# Patient Record
Sex: MALE | Race: WHITE | Employment: FULL TIME | URBAN - METROPOLITAN AREA
[De-identification: names, ages, dates, MRNs, and addresses within clinical notes are randomized per-mention and may not be internally consistent; named-entity substitution may affect disease eponyms.]

---

## 2022-05-03 ENCOUNTER — TRANSCRIBE ORDER (OUTPATIENT)
Dept: SCHEDULING | Age: 57
End: 2022-05-03

## 2022-05-03 DIAGNOSIS — K21.9 ACID REFLUX: Primary | ICD-10-CM

## 2022-05-04 ENCOUNTER — APPOINTMENT (OUTPATIENT)
Dept: GENERAL RADIOLOGY | Age: 57
DRG: 391 | End: 2022-05-04
Attending: EMERGENCY MEDICINE
Payer: COMMERCIAL

## 2022-05-04 ENCOUNTER — APPOINTMENT (OUTPATIENT)
Dept: CT IMAGING | Age: 57
DRG: 391 | End: 2022-05-04
Attending: FAMILY MEDICINE
Payer: COMMERCIAL

## 2022-05-04 ENCOUNTER — HOSPITAL ENCOUNTER (INPATIENT)
Age: 57
LOS: 2 days | Discharge: HOME | DRG: 391 | End: 2022-05-06
Attending: EMERGENCY MEDICINE | Admitting: FAMILY MEDICINE
Payer: COMMERCIAL

## 2022-05-04 ENCOUNTER — TRANSCRIBE ORDER (OUTPATIENT)
Dept: SCHEDULING | Age: 57
End: 2022-05-04

## 2022-05-04 DIAGNOSIS — J69.0 ASPIRATION PNEUMONITIS (HCC): ICD-10-CM

## 2022-05-04 DIAGNOSIS — K21.9 GASTROESOPHAGEAL REFLUX DISEASE WITHOUT ESOPHAGITIS: Primary | ICD-10-CM

## 2022-05-04 DIAGNOSIS — K21.9 ESOPHAGEAL REFLUX: Primary | ICD-10-CM

## 2022-05-04 DIAGNOSIS — J96.01 ACUTE RESPIRATORY FAILURE WITH HYPOXEMIA (HCC): ICD-10-CM

## 2022-05-04 PROBLEM — J96.90 RESPIRATORY FAILURE (HCC): Status: ACTIVE | Noted: 2022-05-04

## 2022-05-04 LAB
ALBUMIN SERPL-MCNC: 3.8 G/DL (ref 3.5–5)
ALBUMIN/GLOB SERPL: 0.9 {RATIO} (ref 1.1–2.2)
ALP SERPL-CCNC: 98 U/L (ref 45–117)
ALT SERPL-CCNC: 26 U/L (ref 12–78)
ANION GAP SERPL CALC-SCNC: 6 MMOL/L (ref 5–15)
AST SERPL-CCNC: 36 U/L (ref 15–37)
ATRIAL RATE: 97 BPM
BASOPHILS # BLD: 0 K/UL (ref 0–0.1)
BASOPHILS NFR BLD: 0 % (ref 0–1)
BILIRUB SERPL-MCNC: 1.3 MG/DL (ref 0.2–1)
BNP SERPL-MCNC: 91 PG/ML
BUN SERPL-MCNC: 21 MG/DL (ref 6–20)
BUN/CREAT SERPL: 14 (ref 12–20)
CALCIUM SERPL-MCNC: 9.7 MG/DL (ref 8.5–10.1)
CALCULATED P AXIS, ECG09: 84 DEGREES
CALCULATED R AXIS, ECG10: 87 DEGREES
CALCULATED T AXIS, ECG11: 61 DEGREES
CHLORIDE SERPL-SCNC: 104 MMOL/L (ref 97–108)
CO2 SERPL-SCNC: 27 MMOL/L (ref 21–32)
COMMENT, HOLDF: NORMAL
COVID-19 RAPID TEST, COVR: NOT DETECTED
CREAT SERPL-MCNC: 1.47 MG/DL (ref 0.7–1.3)
CRP SERPL-MCNC: 1.13 MG/DL (ref 0–0.6)
D DIMER PPP FEU-MCNC: 0.53 MG/L FEU (ref 0–0.65)
DIAGNOSIS, 93000: NORMAL
DIFFERENTIAL METHOD BLD: ABNORMAL
EOSINOPHIL # BLD: 0 K/UL (ref 0–0.4)
EOSINOPHIL NFR BLD: 0 % (ref 0–7)
ERYTHROCYTE [DISTWIDTH] IN BLOOD BY AUTOMATED COUNT: 13.7 % (ref 11.5–14.5)
GLOBULIN SER CALC-MCNC: 4.2 G/DL (ref 2–4)
GLUCOSE SERPL-MCNC: 116 MG/DL (ref 65–100)
HCT VFR BLD AUTO: 42.5 % (ref 36.6–50.3)
HGB BLD-MCNC: 14.2 G/DL (ref 12.1–17)
IMM GRANULOCYTES # BLD AUTO: 0 K/UL (ref 0–0.04)
IMM GRANULOCYTES NFR BLD AUTO: 0 % (ref 0–0.5)
LACTATE SERPL-SCNC: 1.2 MMOL/L (ref 0.4–2)
LYMPHOCYTES # BLD: 0.7 K/UL (ref 0.8–3.5)
LYMPHOCYTES NFR BLD: 5 % (ref 12–49)
MCH RBC QN AUTO: 29.9 PG (ref 26–34)
MCHC RBC AUTO-ENTMCNC: 33.4 G/DL (ref 30–36.5)
MCV RBC AUTO: 89.5 FL (ref 80–99)
MONOCYTES # BLD: 1.2 K/UL (ref 0–1)
MONOCYTES NFR BLD: 8 % (ref 5–13)
NEUTS SEG # BLD: 12.8 K/UL (ref 1.8–8)
NEUTS SEG NFR BLD: 87 % (ref 32–75)
NRBC # BLD: 0 K/UL (ref 0–0.01)
NRBC BLD-RTO: 0 PER 100 WBC
P-R INTERVAL, ECG05: 160 MS
PLATELET # BLD AUTO: 274 K/UL (ref 150–400)
PMV BLD AUTO: 9.7 FL (ref 8.9–12.9)
POTASSIUM SERPL-SCNC: 4.4 MMOL/L (ref 3.5–5.1)
PROT SERPL-MCNC: 8 G/DL (ref 6.4–8.2)
Q-T INTERVAL, ECG07: 348 MS
QRS DURATION, ECG06: 92 MS
QTC CALCULATION (BEZET), ECG08: 441 MS
RBC # BLD AUTO: 4.75 M/UL (ref 4.1–5.7)
RBC MORPH BLD: ABNORMAL
SAMPLES BEING HELD,HOLD: NORMAL
SODIUM SERPL-SCNC: 137 MMOL/L (ref 136–145)
SOURCE, COVRS: NORMAL
TROPONIN-HIGH SENSITIVITY: 8 NG/L (ref 0–76)
TROPONIN-HIGH SENSITIVITY: 9 NG/L (ref 0–76)
VENTRICULAR RATE, ECG03: 97 BPM
WBC # BLD AUTO: 14.7 K/UL (ref 4.1–11.1)

## 2022-05-04 PROCEDURE — 74011000258 HC RX REV CODE- 258: Performed by: EMERGENCY MEDICINE

## 2022-05-04 PROCEDURE — 93005 ELECTROCARDIOGRAM TRACING: CPT

## 2022-05-04 PROCEDURE — 74011250636 HC RX REV CODE- 250/636: Performed by: FAMILY MEDICINE

## 2022-05-04 PROCEDURE — C9113 INJ PANTOPRAZOLE SODIUM, VIA: HCPCS | Performed by: EMERGENCY MEDICINE

## 2022-05-04 PROCEDURE — 87635 SARS-COV-2 COVID-19 AMP PRB: CPT

## 2022-05-04 PROCEDURE — 99285 EMERGENCY DEPT VISIT HI MDM: CPT

## 2022-05-04 PROCEDURE — 71046 X-RAY EXAM CHEST 2 VIEWS: CPT

## 2022-05-04 PROCEDURE — 96374 THER/PROPH/DIAG INJ IV PUSH: CPT

## 2022-05-04 PROCEDURE — 84484 ASSAY OF TROPONIN QUANT: CPT

## 2022-05-04 PROCEDURE — 65660000001 HC RM ICU INTERMED STEPDOWN

## 2022-05-04 PROCEDURE — 96375 TX/PRO/DX INJ NEW DRUG ADDON: CPT

## 2022-05-04 PROCEDURE — 85379 FIBRIN DEGRADATION QUANT: CPT

## 2022-05-04 PROCEDURE — 94762 N-INVAS EAR/PLS OXIMTRY CONT: CPT

## 2022-05-04 PROCEDURE — 80053 COMPREHEN METABOLIC PANEL: CPT

## 2022-05-04 PROCEDURE — 83880 ASSAY OF NATRIURETIC PEPTIDE: CPT

## 2022-05-04 PROCEDURE — 74011250637 HC RX REV CODE- 250/637: Performed by: FAMILY MEDICINE

## 2022-05-04 PROCEDURE — 74011250636 HC RX REV CODE- 250/636: Performed by: EMERGENCY MEDICINE

## 2022-05-04 PROCEDURE — 74011000250 HC RX REV CODE- 250: Performed by: EMERGENCY MEDICINE

## 2022-05-04 PROCEDURE — 85025 COMPLETE CBC W/AUTO DIFF WBC: CPT

## 2022-05-04 PROCEDURE — 84145 PROCALCITONIN (PCT): CPT

## 2022-05-04 PROCEDURE — 74011000250 HC RX REV CODE- 250: Performed by: FAMILY MEDICINE

## 2022-05-04 PROCEDURE — 87040 BLOOD CULTURE FOR BACTERIA: CPT

## 2022-05-04 PROCEDURE — 74011000258 HC RX REV CODE- 258: Performed by: FAMILY MEDICINE

## 2022-05-04 PROCEDURE — 36415 COLL VENOUS BLD VENIPUNCTURE: CPT

## 2022-05-04 PROCEDURE — 83605 ASSAY OF LACTIC ACID: CPT

## 2022-05-04 PROCEDURE — 86140 C-REACTIVE PROTEIN: CPT

## 2022-05-04 RX ORDER — DEXAMETHASONE SODIUM PHOSPHATE 10 MG/ML
10 INJECTION INTRAMUSCULAR; INTRAVENOUS
Status: COMPLETED | OUTPATIENT
Start: 2022-05-04 | End: 2022-05-04

## 2022-05-04 RX ORDER — HEPARIN SODIUM 5000 [USP'U]/ML
5000 INJECTION, SOLUTION INTRAVENOUS; SUBCUTANEOUS EVERY 8 HOURS
Status: DISCONTINUED | OUTPATIENT
Start: 2022-05-04 | End: 2022-05-06 | Stop reason: HOSPADM

## 2022-05-04 RX ORDER — FAMOTIDINE 20 MG/1
20 TABLET, FILM COATED ORAL 2 TIMES DAILY
Status: DISCONTINUED | OUTPATIENT
Start: 2022-05-04 | End: 2022-05-06 | Stop reason: HOSPADM

## 2022-05-04 RX ORDER — SODIUM CHLORIDE 9 MG/ML
75 INJECTION, SOLUTION INTRAVENOUS CONTINUOUS
Status: DISCONTINUED | OUTPATIENT
Start: 2022-05-04 | End: 2022-05-06 | Stop reason: HOSPADM

## 2022-05-04 RX ORDER — SODIUM CHLORIDE 0.9 % (FLUSH) 0.9 %
5-40 SYRINGE (ML) INJECTION AS NEEDED
Status: DISCONTINUED | OUTPATIENT
Start: 2022-05-04 | End: 2022-05-06 | Stop reason: HOSPADM

## 2022-05-04 RX ORDER — SODIUM CHLORIDE 0.9 % (FLUSH) 0.9 %
5-40 SYRINGE (ML) INJECTION EVERY 8 HOURS
Status: DISCONTINUED | OUTPATIENT
Start: 2022-05-04 | End: 2022-05-06 | Stop reason: HOSPADM

## 2022-05-04 RX ORDER — PANTOPRAZOLE SODIUM 40 MG/10ML
40 INJECTION, POWDER, LYOPHILIZED, FOR SOLUTION INTRAVENOUS DAILY
Status: DISCONTINUED | OUTPATIENT
Start: 2022-05-05 | End: 2022-05-06 | Stop reason: HOSPADM

## 2022-05-04 RX ORDER — ACETAMINOPHEN 325 MG/1
650 TABLET ORAL
Status: DISCONTINUED | OUTPATIENT
Start: 2022-05-04 | End: 2022-05-06 | Stop reason: HOSPADM

## 2022-05-04 RX ORDER — SODIUM CHLORIDE 0.9 % (FLUSH) 0.9 %
10 SYRINGE (ML) INJECTION DAILY
Status: DISCONTINUED | OUTPATIENT
Start: 2022-05-05 | End: 2022-05-06 | Stop reason: HOSPADM

## 2022-05-04 RX ADMIN — SODIUM CHLORIDE, PRESERVATIVE FREE 10 ML: 5 INJECTION INTRAVENOUS at 18:34

## 2022-05-04 RX ADMIN — PIPERACILLIN AND TAZOBACTAM 4.5 G: 4; .5 INJECTION, POWDER, LYOPHILIZED, FOR SOLUTION INTRAVENOUS at 17:25

## 2022-05-04 RX ADMIN — SODIUM CHLORIDE, PRESERVATIVE FREE 80 MG: 5 INJECTION INTRAVENOUS at 12:45

## 2022-05-04 RX ADMIN — SODIUM CHLORIDE 75 ML/HR: 9 INJECTION, SOLUTION INTRAVENOUS at 18:32

## 2022-05-04 RX ADMIN — SODIUM CHLORIDE 1.5 G: 900 INJECTION INTRAVENOUS at 16:37

## 2022-05-04 RX ADMIN — HEPARIN SODIUM 5000 UNITS: 5000 INJECTION INTRAVENOUS; SUBCUTANEOUS at 18:34

## 2022-05-04 RX ADMIN — FAMOTIDINE 20 MG: 20 TABLET ORAL at 17:21

## 2022-05-04 RX ADMIN — SODIUM CHLORIDE, PRESERVATIVE FREE 20 MG: 5 INJECTION INTRAVENOUS at 12:46

## 2022-05-04 RX ADMIN — SODIUM CHLORIDE, PRESERVATIVE FREE 10 ML: 5 INJECTION INTRAVENOUS at 22:00

## 2022-05-04 RX ADMIN — DEXAMETHASONE SODIUM PHOSPHATE 10 MG: 10 INJECTION INTRAMUSCULAR; INTRAVENOUS at 16:35

## 2022-05-04 NOTE — ED NOTES
Bedside shift change report given to Sánchez Garza (oncoming nurse) by Chelo Peterson (offgoing nurse). Report included the following information SBAR, Kardex, ED Summary and MAR.

## 2022-05-04 NOTE — ED TRIAGE NOTES
Pt ambulatory to triage from c/o constant acid reflux, weight loss, fatigue and poor appetite. Hx esophageal cancer. Thinks he aspirated last night and has been coughing. Had CT last week with oncology- negative.

## 2022-05-04 NOTE — ED PROVIDER NOTES
Dr Fawn Allison is oncologist at [facility]. GI is Dr Elvira Burgos. The history is provided by the patient. Shortness of Breath  This is a new problem. The average episode lasts 12 hours. The problem occurs continuously. The current episode started 6 to 12 hours ago. The problem has been gradually worsening. Associated symptoms include cough. Pertinent negatives include no fever, no wheezing, no syncope and no leg swelling. Associated symptoms comments: Severe reflux and believes he aspirated overnight. Precipitated by: GERD, difficulty with eating with loss of appetite and weight loss. history of esophageal cancer. He has tried nothing (home PPI and H2 blockers) for the symptoms. The treatment provided no relief. Weight Loss   Pertinent negatives include no fever. Past Medical History:   Diagnosis Date    Esophageal cancer [facility])     2008    Prostate cancer [facility])     2014    Radiation exposure        Past Surgical History:   Procedure Laterality Date    HX ESOPHAGECTOMY N/A 2008    removal of tumor from esophagus and stomach    HX PROSTATECTOMY N/A     2014         Family History:   Problem Relation Age of Onset    Cancer Mother     Cancer Father        Social History     Socioeconomic History    Marital status:      Spouse name: Not on file    Number of children: Not on file    Years of education: Not on file    Highest education level: Not on file   Occupational History    Not on file   Tobacco Use    Smoking status: Never Smoker    Smokeless tobacco: Never Used   Substance and Sexual Activity    Alcohol use:  Yes     Alcohol/week: 1.0 - 2.0 standard drink     Types: 1 - 2 Glasses of wine per week    Drug use: No    Sexual activity: Not on file   Other Topics Concern    Not on file   Social History Narrative    Not on file     Social Determinants of Health     Financial Resource Strain:     Difficulty of Paying Living Expenses: Not on file   Food Insecurity:     Worried About Running Out of Food in the Last Year: Not on file    Ran Out of Food in the Last Year: Not on file   Transportation Needs:     Lack of Transportation (Medical): Not on file    Lack of Transportation (Non-Medical): Not on file   Physical Activity:     Days of Exercise per Week: Not on file    Minutes of Exercise per Session: Not on file   Stress:     Feeling of Stress : Not on file   Social Connections:     Frequency of Communication with Friends and Family: Not on file    Frequency of Social Gatherings with Friends and Family: Not on file    Attends Yazidi Services: Not on file    Active Member of [address] [place] [address] [employer] or Organizations: Not on file    Attends Club or Organization Meetings: Not on file    Marital Status: Not on file   Intimate Partner Violence:     Fear of Current or Ex-Partner: Not on file    Emotionally Abused: Not on file    Physically Abused: Not on file    Sexually Abused: Not on file   Housing Stability:     Unable to Pay for Housing in the Last Year: Not on file    Number of [place] in the Last Year: Not on file    Unstable Housing in the Last Year: Not on file         ALLERGIES: Patient has no known allergies. Review of Systems   Constitutional: Negative for fever. Respiratory: Positive for cough and shortness of breath. Negative for wheezing. Cardiovascular: Negative for leg swelling and syncope. All other systems reviewed and are negative. Vitals:    05/04/22 1030 05/04/22 1147   BP: 124/79 137/82   Pulse: (!) 101 94   Resp: 18 20   Temp: 97.6 °F (36.4 °C) 98.2 °F (36.8 °C)   SpO2: 92% 91%            Physical Exam  Vitals and nursing note reviewed. Constitutional:       General: He is not in acute distress. Appearance: He is well-developed. HENT:      Head: Normocephalic and atraumatic. Eyes:      Conjunctiva/sclera: Conjunctivae normal.   Neck:      Trachea: No tracheal deviation. Cardiovascular:      Rate and Rhythm: Normal rate and regular rhythm. Pulmonary:      Effort: Pulmonary effort is normal. No respiratory distress. Breath sounds: Normal breath sounds. No decreased breath sounds, wheezing, rhonchi or rales. Abdominal:      General: There is no distension. Palpations: Abdomen is soft. Musculoskeletal:         General: No deformity. Normal range of motion. Cervical back: Neck supple. Skin:     General: Skin is warm and dry. Neurological:      Mental Status: He is alert. Cranial Nerves: No cranial nerve deficit. Psychiatric:         Behavior: Behavior normal.          MDM     31-year-old male presents after having severe reflux symptoms overnight which have been progressive in nature. He is followed by Dr. Dorene Sanders of GI after having esophageal cancer. He had reflux episodes overnight and has been coughing with new hypoxemia to 88% on room air, he is unable to wean from oxygen and is on 2 L nasal cannula. D-dimer is negative and is appropriately low risk for PE with more plausible alternative explanation for hypoxemia. Hospitalist was consulted to admit for monitoring and weaning of oxygen. Procedures    Perfect Serve Consult for Admission  3:05 PM    ED Room Number: XI60/69  Patient Name and age: Sriram Anderson 62 y.o.  male  Working Diagnosis:   1. Gastroesophageal reflux disease without esophagitis    2. Aspiration pneumonitis ([address]    3.  Acute respiratory failure with hypoxemia ([facility] [address]        COVID-19 Suspicion:  no  Sepsis present:  no  Reassessment needed: no  Code Status:  Full Code  Readmission: no  Isolation Requirements:  no  Recommended Level of Care:  telemetry  Department:[facility] Adult ED - 21

## 2022-05-04 NOTE — ED NOTES
Bedside shift change report given to Army Clarke (oncoming nurse) by Lonell Landau (offgoing nurse). Report included the following information SBAR, ED Summary, MAR and Recent Results.

## 2022-05-04 NOTE — H&P
[address] [facility] Adult  Hospitalist Group  History and Physical    Date of Service:  5/4/2022  Primary Care Provider: Neena Larson MD  Source of information: The patient and Chart review    Chief Complaint: Shortness of Breath and Weight Loss      History of Presenting Illness:   Danay Jerry is a 62 y.o. male who presents with shortness of breath    History was probably obtained from the patient, patient reports that he has extremely bad acid reflux, for the past month or so he is on reflux has gotten worse, reports that in the last 1 week he is getting severe symptoms, reports that yesterday he felt like he aspirated, this morning started having some shortness of breath, came to the ER, patient reports he has history of esophageal cancer diagnosed in 2008, this underwent treatment for the same, and is currently in remission, reports that he had a CT scan of the abdomen pelvis and chest done about a week back and \"everything was normal\". Patient denies any other complaints or problems. The patient denies any headache, blurry vision, sore throat, trouble swallowing, trouble with speech, chest pain,cough, fever, chills, , abd pain, urinary symptoms, constipation, recent travels, sick contacts, focal or generalized neurological symptoms, falls, injuries, rashes, contact with COVID-19 diagnosed patients, hematemesis, melena, hemoptysis, hematuria, rashes, denies starting any new medications and denies any other concerns or problems besides as mentioned above. REVIEW OF SYSTEMS:  A comprehensive review of systems was negative except for that written in the History of Present Illness.      Past Medical History:   Diagnosis Date    Esophageal cancer [facility])     2008    Prostate cancer [facility])     2014    Radiation exposure       Past Surgical History:   Procedure Laterality Date    HX ESOPHAGECTOMY N/A 2008    removal of tumor from esophagus and stomach    HX PROSTATECTOMY N/A     2014     Prior to Admission medications    Medication Sig Start Date End Date Taking? Authorizing Provider   esomeprazole (NEXIUM) 40 mg capsule Take  by mouth daily. Provider, Historical   ezetimibe (ZETIA) 10 mg tablet Take  by mouth. Provider, Historical     No Known Allergies   Family History   Problem Relation Age of Onset    Cancer Mother     Cancer Father       Social History:  reports that he has never smoked. He has never used smokeless tobacco. He reports current alcohol use of about 1.0 - 2.0 standard drink of alcohol per week. He reports that he does not use drugs. Family and social history were personally reviewed, all pertinent and relevant details are outlined as above. Objective:     Visit Vitals  BP (!) 141/81   Pulse 94   Temp 98.2 °F (36.8 °C)   Resp 20   SpO2 95%    O2 Flow Rate (L/min): 3 l/min O2 Device: Nasal cannula    PHYSICAL EXAM:   General: Alert x oriented x 3, awake, no acute distress, resting in bed, pleasant male, appears to be stated age  HEENT: PEERL, EOMI, moist mucus membranes  Neck: Supple, no JVD, no meningeal signs  Chest: Decreased basal breath sound  CVS: RRR, S1 S2 heard, no murmurs/rubs/gallops  Abd: Soft, non-tender, non-distended, +bowel sounds   Ext: No clubbing, no cyanosis, no edema  Neuro/Psych: No focal neurological deficits  Cap refill: Brisk, less than 3 seconds  Pulses: 2+, symmetric in all extremities  Skin: Warm, dry, without rashes or lesions    Data Review: All diagnostic labs and studies have been reviewed.     Abnormal Labs Reviewed   METABOLIC PANEL, COMPREHENSIVE - Abnormal; Notable for the following components:       Result Value    Glucose 116 (*)     BUN 21 (*)     Creatinine 1.47 (*)     GFR est AA 60 (*)     GFR est non-AA 49 (*)     Bilirubin, total 1.3 (*)     Globulin 4.2 (*)     A-G Ratio 0.9 (*)     All other components within normal limits   CBC WITH AUTOMATED DIFF - Abnormal; Notable for the following components:    WBC 14.7 (*) NEUTROPHILS 87 (*)     LYMPHOCYTES 5 (*)     ABS. NEUTROPHILS 12.8 (*)     ABS. LYMPHOCYTES 0.7 (*)     ABS. MONOCYTES 1.2 (*)     All other components within normal limits   C REACTIVE PROTEIN, QT - Abnormal; Notable for the following components:    C-Reactive protein 1.13 (*)     All other components within normal limits       All Micro Results     Procedure Component Value Units Date/Time    CULTURE, BLOOD, PAIRED [[id number]] Collected: 05/04/22 1600    Order Status: Completed Specimen: Blood Updated: 05/04/22 1632    COVID-19 RAPID TEST [[id number]]     Order Status: Sent           IMAGING:   XR CHEST PA LAT   Final Result   No acute abnormality. XR UPPER GI/SMALL BOWEL    (Results Pending)        ECG/ECHO:    Results for orders placed or performed during the hospital encounter of 05/04/22   EKG, 12 LEAD, INITIAL   Result Value Ref Range    Ventricular Rate 97 BPM    Atrial Rate 97 BPM    P-R Interval 160 ms    QRS Duration 92 ms    Q-T Interval 348 ms    QTC Calculation (Bezet) 441 ms    Calculated P Axis 84 degrees    Calculated R Axis 87 degrees    Calculated T Axis 61 degrees    Diagnosis       Normal sinus rhythm  Nonspecific ST abnormality    When compared with ECG of 01-AUG-2008 09:18,  No significant change  Confirmed by Viji Sutton M.D., Lottie So ([id number]) on 5/4/2022 3:13:36 PM          Assessment:   Given the patient's current clinical presentation, there is a high level of concern for decompensation if discharged from the emergency department. Complex decision making was performed, which includes reviewing the patient's available past medical records, laboratory results, and imaging studies.     Acute hypoxic respiratory failure  Aspiration pneumonia  Acid reflux  History of esophageal cancer  HUEY on CKD  Plan:   Patient will be admitted on telemetry bed, respiratory failure likely secondary to aspiration pneumonia, start patient broad-spectrum IV antibiotics, continue oxygen support, pulse ox monitoring, aspiration precautions, close monitoring and further intervention per hospital course  Protonix and Pepcid, GI consult, monitor  Likely prerenal, avoid nephrotoxic medication, renally dose all medication, gentle IV hydration, trend creatinine        DIET: No diet orders on file   ISOLATION PRECAUTIONS: There are currently no Active Isolations  CODE STATUS: No Order   DVT PROPHYLAXIS: Heparin  FUNCTIONAL STATUS PRIOR TO HOSPITALIZATION: Fully active and ambulatory; able to carry on all self-care without restriction. EARLY MOBILITY ASSESSMENT: Recommend routine ambulation while hospitalized with the assistance of nursing staff  ANTICIPATED DISCHARGE: Greater than 48 hours. Signed By: Nancy Verde MD     May 4, 2022         Please note that this dictation may have been completed with Dragon, the computer voice recognition software. Quite often unanticipated grammatical, syntax, homophones, and other interpretive errors are inadvertently transcribed by the computer software. Please disregard these errors. Please excuse any errors that have escaped final proofreading.

## 2022-05-05 ENCOUNTER — APPOINTMENT (OUTPATIENT)
Dept: GENERAL RADIOLOGY | Age: 57
DRG: 391 | End: 2022-05-05
Attending: EMERGENCY MEDICINE
Payer: COMMERCIAL

## 2022-05-05 PROBLEM — E43 SEVERE PROTEIN-CALORIE MALNUTRITION (HCC): Status: ACTIVE | Noted: 2022-05-05

## 2022-05-05 LAB
ANION GAP SERPL CALC-SCNC: 8 MMOL/L (ref 5–15)
BASOPHILS # BLD: 0 K/UL (ref 0–0.1)
BASOPHILS NFR BLD: 0 % (ref 0–1)
BUN SERPL-MCNC: 24 MG/DL (ref 6–20)
BUN/CREAT SERPL: 18 (ref 12–20)
CALCIUM SERPL-MCNC: 8.8 MG/DL (ref 8.5–10.1)
CHLORIDE SERPL-SCNC: 104 MMOL/L (ref 97–108)
CO2 SERPL-SCNC: 24 MMOL/L (ref 21–32)
CREAT SERPL-MCNC: 1.32 MG/DL (ref 0.7–1.3)
DIFFERENTIAL METHOD BLD: ABNORMAL
EOSINOPHIL # BLD: 0 K/UL (ref 0–0.4)
EOSINOPHIL NFR BLD: 0 % (ref 0–7)
ERYTHROCYTE [DISTWIDTH] IN BLOOD BY AUTOMATED COUNT: 13.5 % (ref 11.5–14.5)
GLUCOSE SERPL-MCNC: 149 MG/DL (ref 65–100)
HCT VFR BLD AUTO: 39.3 % (ref 36.6–50.3)
HGB BLD-MCNC: 13 G/DL (ref 12.1–17)
IMM GRANULOCYTES # BLD AUTO: 0 K/UL (ref 0–0.04)
IMM GRANULOCYTES NFR BLD AUTO: 0 % (ref 0–0.5)
LYMPHOCYTES # BLD: 0.5 K/UL (ref 0.8–3.5)
LYMPHOCYTES NFR BLD: 5 % (ref 12–49)
MCH RBC QN AUTO: 29.3 PG (ref 26–34)
MCHC RBC AUTO-ENTMCNC: 33.1 G/DL (ref 30–36.5)
MCV RBC AUTO: 88.5 FL (ref 80–99)
MONOCYTES # BLD: 0.2 K/UL (ref 0–1)
MONOCYTES NFR BLD: 2 % (ref 5–13)
NEUTS SEG # BLD: 9.2 K/UL (ref 1.8–8)
NEUTS SEG NFR BLD: 93 % (ref 32–75)
NRBC # BLD: 0 K/UL (ref 0–0.01)
NRBC BLD-RTO: 0 PER 100 WBC
PLATELET # BLD AUTO: 239 K/UL (ref 150–400)
PLATELET COMMENTS,PCOM: ABNORMAL
PMV BLD AUTO: 9.2 FL (ref 8.9–12.9)
POTASSIUM SERPL-SCNC: 4.1 MMOL/L (ref 3.5–5.1)
PROCALCITONIN SERPL-MCNC: <0.05 NG/ML
RBC # BLD AUTO: 4.44 M/UL (ref 4.1–5.7)
RBC MORPH BLD: ABNORMAL
SODIUM SERPL-SCNC: 136 MMOL/L (ref 136–145)
TROPONIN-HIGH SENSITIVITY: 6 NG/L (ref 0–76)
WBC # BLD AUTO: 9.9 K/UL (ref 4.1–11.1)

## 2022-05-05 PROCEDURE — 74240 X-RAY XM UPR GI TRC 1CNTRST: CPT

## 2022-05-05 PROCEDURE — 36415 COLL VENOUS BLD VENIPUNCTURE: CPT

## 2022-05-05 PROCEDURE — 74011250636 HC RX REV CODE- 250/636: Performed by: PHYSICIAN ASSISTANT

## 2022-05-05 PROCEDURE — 74011250637 HC RX REV CODE- 250/637: Performed by: FAMILY MEDICINE

## 2022-05-05 PROCEDURE — 77010033678 HC OXYGEN DAILY

## 2022-05-05 PROCEDURE — 74011000250 HC RX REV CODE- 250: Performed by: FAMILY MEDICINE

## 2022-05-05 PROCEDURE — 80048 BASIC METABOLIC PNL TOTAL CA: CPT

## 2022-05-05 PROCEDURE — C9113 INJ PANTOPRAZOLE SODIUM, VIA: HCPCS | Performed by: FAMILY MEDICINE

## 2022-05-05 PROCEDURE — 74011000258 HC RX REV CODE- 258: Performed by: FAMILY MEDICINE

## 2022-05-05 PROCEDURE — 65660000001 HC RM ICU INTERMED STEPDOWN

## 2022-05-05 PROCEDURE — 85025 COMPLETE CBC W/AUTO DIFF WBC: CPT

## 2022-05-05 PROCEDURE — 99221 1ST HOSP IP/OBS SF/LOW 40: CPT

## 2022-05-05 PROCEDURE — 74011250636 HC RX REV CODE- 250/636: Performed by: FAMILY MEDICINE

## 2022-05-05 PROCEDURE — 84484 ASSAY OF TROPONIN QUANT: CPT

## 2022-05-05 RX ORDER — FLUTICASONE PROPIONATE 50 MCG
2 SPRAY, SUSPENSION (ML) NASAL
COMMUNITY
End: 2022-10-03

## 2022-05-05 RX ORDER — DEXLANSOPRAZOLE 60 MG/1
60 CAPSULE, DELAYED RELEASE ORAL DAILY
COMMUNITY
End: 2022-05-24

## 2022-05-05 RX ORDER — BISMUTH SUBSALICYLATE 262 MG
1 TABLET,CHEWABLE ORAL DAILY
COMMUNITY
End: 2022-05-24

## 2022-05-05 RX ORDER — METOCLOPRAMIDE HYDROCHLORIDE 5 MG/ML
10 INJECTION INTRAMUSCULAR; INTRAVENOUS EVERY 8 HOURS
Status: DISCONTINUED | OUTPATIENT
Start: 2022-05-05 | End: 2022-05-06 | Stop reason: HOSPADM

## 2022-05-05 RX ORDER — FAMOTIDINE 20 MG/1
20 TABLET, FILM COATED ORAL
COMMUNITY
End: 2022-05-24

## 2022-05-05 RX ADMIN — SODIUM CHLORIDE, PRESERVATIVE FREE 10 ML: 5 INJECTION INTRAVENOUS at 14:00

## 2022-05-05 RX ADMIN — SODIUM CHLORIDE 75 ML/HR: 9 INJECTION, SOLUTION INTRAVENOUS at 14:57

## 2022-05-05 RX ADMIN — Medication 10 ML: at 09:00

## 2022-05-05 RX ADMIN — SODIUM CHLORIDE, PRESERVATIVE FREE 10 ML: 5 INJECTION INTRAVENOUS at 22:00

## 2022-05-05 RX ADMIN — PIPERACILLIN SODIUM AND TAZOBACTAM SODIUM 3.38 G: 3; 375 INJECTION, POWDER, FOR SOLUTION INTRAVENOUS at 22:18

## 2022-05-05 RX ADMIN — HEPARIN SODIUM 5000 UNITS: 5000 INJECTION INTRAVENOUS; SUBCUTANEOUS at 02:37

## 2022-05-05 RX ADMIN — PANTOPRAZOLE SODIUM 40 MG: 40 INJECTION, POWDER, FOR SOLUTION INTRAVENOUS at 14:57

## 2022-05-05 RX ADMIN — PIPERACILLIN SODIUM AND TAZOBACTAM SODIUM 3.38 G: 3; 375 INJECTION, POWDER, FOR SOLUTION INTRAVENOUS at 14:57

## 2022-05-05 RX ADMIN — METOCLOPRAMIDE HYDROCHLORIDE 10 MG: 5 INJECTION INTRAMUSCULAR; INTRAVENOUS at 19:51

## 2022-05-05 RX ADMIN — PIPERACILLIN SODIUM AND TAZOBACTAM SODIUM 3.38 G: 3; 375 INJECTION, POWDER, FOR SOLUTION INTRAVENOUS at 02:47

## 2022-05-05 RX ADMIN — FAMOTIDINE 20 MG: 20 TABLET ORAL at 19:31

## 2022-05-05 NOTE — CONSULTS
[facility].   [address]  [address] [id number]        GASTROENTEROLOGY CONSULTATION NOTE  Will Aashish Og  [phone] office  [phone] NP/PA in-hospital cell phone M-F until 4:30PM  After 5PM or on weekends, please call  for physician on call        NAME:  Armani Peters   :   1965   MRN:   [id number]       Referring Physician: Dr. Jett Emmanuel Date: 2022 10:21 AM    Chief Complaint: reflux     History of Present Illness:  Patient is a 62 y.o. who is seen in consultation at the request of Dr. Dari Maddox for GERD. Patient has a past medical history significant for esophageal cancer (reported remission). He presented to the ED with complaints of severe reflux. Patient was admitted to the hospital on 22 for acute respiratory failure, aspiration pneumonia, acid reflux, acute kidney injury on chronic kidney disease, and history of esophageal cancer. He presented to the ED with worsening reflux and shortness of breath following an episode of reported aspiration. Patient was admitted to the hospital on 22 for acute respiratory failure, aspiration pneumonia, acid reflux, and acute kidney injury on chronic kidney disease. Patient was seen by Sherry Ware NP, on  22. Patient has a history of esophageal cancer status post esophagectomy years ago and has been asymptomatic until approximately 12 weeks ago. He has reported worsening reflux and chest pressure which has been limiting his PO intake and causing weight loss and dehydration. He was reportedly seen by Dr. Alan Zhang last week and had normal labs and a negative CT scan. Patient has been on Dexilant 60 mg daily and Pepcid 3-4 times daily with minimal improvement. He has been unable to sleep due to reflux despite sleeping on a wedge. No nausea, vomiting, or abdominal pain. No melena or hematochezia.   Patient reports an episode of aspiration on Tuesday night and started having shortness of breath yesterday morning prior to presentation (improved at this time). No NSAID use. No anticoagulation. No alcohol or tobacco use. EGD (2/15/22) by Dr. Leticia Aiken showed a normal esophago-gastric anastomosis; otherwise, normal.  A repeat EGD was recommended in 1 year. I have reviewed the emergency room note, hospital admission note, notes by all other clinicians who have seen the patient during this hospitalization to date. I have reviewed the problem list and the reason for this hospitalization. I have reviewed the allergies and the medications the patient was taking at home prior to this hospitalization. PMH:  Past Medical History:   Diagnosis Date    Esophageal cancer [facility])     2008    Prostate cancer [facility])     2014    Radiation exposure        PSH:  Past Surgical History:   Procedure Laterality Date    HX ESOPHAGECTOMY N/A 2008    removal of tumor from esophagus and stomach    HX PROSTATECTOMY N/A     2014       Allergies:  No Known Allergies    Home Medications:  Prior to Admission Medications   Prescriptions Last Dose Informant Patient Reported? Taking?   esomeprazole (NEXIUM) 40 mg capsule   Yes No   Sig: Take  by mouth daily. ezetimibe (ZETIA) 10 mg tablet   Yes No   Sig: Take  by mouth.       Facility-Administered Medications: None       Hospital Medications:  Current Facility-Administered Medications   Medication Dose Route Frequency    sodium chloride (NS) flush 5-40 mL  5-40 mL IntraVENous Q8H    sodium chloride (NS) flush 5-40 mL  5-40 mL IntraVENous PRN    0.9% sodium chloride infusion  75 mL/hr IntraVENous CONTINUOUS    acetaminophen (TYLENOL) tablet 650 mg  650 mg Oral Q4H PRN    heparin (porcine) injection 5,000 Units  5,000 Units SubCUTAneous Q8H    famotidine (PEPCID) tablet 20 mg  20 mg Oral BID    piperacillin-tazobactam (ZOSYN) 3.375 g in 0.9% sodium chloride (MBP/ADV) 100 mL MBP  3.375 g IntraVENous Q8H    pantoprazole (PROTONIX) injection 40 mg  40 mg IntraVENous DAILY    And    sodium chloride (NS) flush 10 mL  10 mL IntraVENous DAILY       Social History:  Social History     Tobacco Use    Smoking status: Never Smoker    Smokeless tobacco: Never Used   Substance Use Topics    Alcohol use: Yes     Alcohol/week: 1.0 - 2.0 standard drink     Types: 1 - 2 Glasses of wine per week       Family History:  Family History   Problem Relation Age of Onset    Cancer Mother     Cancer Father      Review of Systems:  Constitutional: negative fever, negative chills, negative weight loss  Eyes:   negative visual changes  ENT:   negative sore throat, tongue or lip swelling  Respiratory:  negative cough, negative dyspnea  Cards:  negative for chest pain, palpitations, lower extremity edema  GI:   See HPI  :  negative for frequency, dysuria  Integument:  negative for rash and pruritus  Heme:  negative for easy bruising and gum/nose bleeding  Musculoskeletal:negative for myalgias, back pain and muscle weakness  Neuro:    negative for headaches, dizziness  Psych: negative for feelings of anxiety, depression     Objective:     Patient Vitals for the past 8 hrs:   BP Pulse Resp SpO2   05/05/22 1007 -- 73 -- --   05/05/22 0600 -- 72 -- --   05/05/22 0400 -- 74 -- --   05/05/22 0300 105/72 68 16 96 %     No intake/output data recorded. 05/03 1901 - 05/05 0700  In: 150 [I.V.:150]  Out: -     EXAM:     CONST:  Pleasant male lying in bed, no acute distress, wife at the bedside   NEURO:  Alert and oriented x 3   HEENT: EOMI, no scleral icterus   LUNGS: No acute respiratory distress   CARD:  S1 S2   ABD:  Soft, non distended, no tenderness, no rebound, no guarding. + Bowel sounds.     EXT:  Warm   PSYCH: Full, not anxious or agitated     Data Review     Recent Labs     05/05/22  0246 05/04/22  1215   WBC 9.9 14.7*   HGB 13.0 14.2   HCT 39.3 42.5    274     Recent Labs     05/05/22  0246 05/04/22  1127    137   K 4.1 4.4    104   CO2 24 27   BUN 24* 21*   CREA 1.32* 1.47* * 116*   CA 8.8 9.7     Recent Labs     05/04/22  1127   AP 98   TP 8.0   ALB 3.8   GLOB 4.2*     No results for input(s): INR, PTP, APTT, INREXT in the last 72 hours. EGD (2/15/22) by Dr. Art Herr showed a normal esophago-gastric anastomosis; otherwise, normal.  A repeat EGD was recommended in 1 year. Assessment:   · Reflux: EGD 2/2022 as above. · History of esophageal cancer  · Acute respiratory failure: CXR (5/4/22): no acute abnormality.    · Acute kidney injury on chronic kidney disease     Patient Active Problem List   Diagnosis Code    Respiratory failure ([facility] J96.90     Plan:     · PPI BID  · H2 blocker  · Follow UGI series results  · Consult oncology, Dr. Matamoros Asp  · Consult surgical oncology, Dr. Zonia Mancia  · Patient was discussed with Dr. Art Herr  · Thank you for allowing me to participate in care of [facility]     Signed By: Ron Mcleod, [id number]     5/5/2022  10:21 AM     Agree with above    Severe reflux on UGI, not well controlled on medical therapy  Trial of IV reglan  May need J tube for TF, awaiting Dr Leeanna Mcleod input on that

## 2022-05-05 NOTE — PROGRESS NOTES
Bedside shift change report given to Santo Morrow (oncoming nurse) by Tomasa Yeager RN (offgoing nurse). Report included the following information SBAR, Kardex, MAR and Cardiac Rhythm NSR.

## 2022-05-05 NOTE — PROGRESS NOTES
Transition of Care Plan:       RUR:  6% GLOS:    TBD  LOS:    1  Disposition:   Discharge planned to home when medically stable  Transportation at Discharge:   Spouse to transport/Lesley Andrew [phone]  IM Medicare letter:   N/A Commercial Payor  Caregiver Contact/NOK:   Sammi Arevalo [phone]  Plan of Care: To [facility] with shortness of breath, respiratory failure likely secondary to aspiration pneumonia     Hospitalist Consulted   IV  Antibiotics, Continuous Oxygen   Consults:  GI Consult, Surgical Oncology, and Oncology   Full Code, No ACP on file  Spouse - NOK and [address], Mr. Andrew alert and oriented. Reason for Admission:  Respiratory Failure secondary to aspiration pneumonia, COVID Negative 5/4/22                   RUR Score:          6%           Plan for utilizing home health:      No home health identified     PCP: First and Last name:  Rama Hennessy MD     Name of Practice:    Are you a current patient: Yes/No:  Yes   Approximate date of last visit:  April, 2022   Can you participate in a virtual visit with your PCP:  Yes                    Current Advanced Directive/Advance Care Plan: Full Code    Healthcare Decision Maker:     Healthcare Decision Maker   Sammi Arevalo [phone] [facility]    Transition of Care Plan:                      Amara Guevara is a 62year old male to [facility] ED with shortness of breath, he describes bad acid reflux for past month. He felt like he aspirated. He gives history of esophageal cancer in 2008, treatments finished and in remission. Verified facesheet/demographics. Care Management spoke with patient and spouse. Living Situation: Lives with spouse,independent,    ADLs:   Independent  DME:   None  Prior Home Health: 14 year ago  Prior SNF/IPR: None  Demographics: Verified  Payor Source: [id number]:  [facility] follow and assist for transition of care needs.     Shya Jorge Murdock RN, BSN, [facility]  ED Care Management  [phone]

## 2022-05-05 NOTE — PROGRESS NOTES
Admission Medication Reconciliation:    Information obtained from:  Patient  RxQuery data available¹:  YES    Comments/Recommendations: Updated PTA meds/reviewed patient's allergies. 1)  Information obtained from patient (reliable historian)    2)  Medication changes (since last review): Added  - dexilant  -flonase  -MVI  -pepcid (usually takes with lunch, dinner, and in the evening)    Removed  - nexium  -zetia       ¹RxQuery pharmacy benefit data reflects medications filled and processed through the patient's insurance, however   this data does NOT capture whether the medication was picked up or is currently being taken by the patient. Allergies:  Patient has no known allergies. Significant PMH/Disease States:   Past Medical History:   Diagnosis Date    Esophageal cancer ([facility] [address]         Prostate cancer [facility])         Radiation exposure      Chief Complaint for this Admission:    Chief Complaint   Patient presents with    Shortness of Breath    Weight Loss     Prior to Admission Medications:   Prior to Admission Medications   Prescriptions Last Dose Informant Taking?   dexlansoprazole (Dexilant) 60 mg CpDB capsule (delayed release)   Yes   Sig: Take 60 mg by mouth daily. famotidine (Pepcid) 20 mg tablet   Yes   Sig: Take 20 mg by mouth three (3) times daily as needed for Heartburn. Usually takes with lunch, dinner and evening time   fluticasone propionate (Flonase Allergy Relief) 50 mcg/actuation nasal spray   Yes   Si Sprays by Both Nostrils route daily as needed. multivitamin (ONE A DAY) tablet   Yes   Sig: Take 1 Tablet by mouth daily. Facility-Administered Medications: None     Please contact the main inpatient pharmacy with any questions or concerns at [phone] and we will direct you to the clinical pharmacist covering this patient's care while in-house.    Asael Orozco, PHARMD

## 2022-05-05 NOTE — PROGRESS NOTES
TRANSFER - IN REPORT:    Verbal report received from [facility]) on Hamida Inch  being received from ED (unit) for routine progression of care      Report consisted of patients Situation, Background, Assessment and   Recommendations(SBAR). Information from the following report(s) SBAR, MAR and Cardiac Rhythm NSR was reviewed with the receiving nurse. Opportunity for questions and clarification was provided. Assessment completed upon patients arrival to unit and care assumed.      Primary Nurse Yasmani Mahoney RN and Tej Menendez RN performed a dual skin assessment on this patient No impairment noted  Yemi score is 21

## 2022-05-05 NOTE — CONSULTS
[facility]  Inpatient Consultation        Admit Date: 5/4/2022  Reason for Consultation: history of esophageal cancer s/p esophagectomy, worsening reflux/chest discomfort     HPI:  Phoebe Valencia is a 62 y.o. male past medical history significant for esophageal cancer  s/p esophagectomy in 8/2008 with Dr. Ortega  (reported remission)  whom we are asked to see in consultation by JEREMY Peña  for the above complaint. He presented to the ED with worsening reflux and shortness of breath following an episode of reported aspiration. Patient was admitted to the hospital on 5/4/22 for acute respiratory failure, aspiration pneumonia, acid reflux, and acute kidney injury on chronic kidney disease. Patient reports worsening reflux and chest pressure that has been limiting his PO intake causing weight loss and dehydration. He has lost 25 lbs since January. He was reportedly seen by Dr. Donte Oconnell last week and had normal labs and a negative CT scan. Patient has been on Dexilant 60 mg daily and Pepcid 3-4 times daily with minimal improvement. Reflux has been an \"issue\" for him since January. It tends to be worse at night and wakes him up despite sleeping on a wedge. No nausea, vomiting, or abdominal pain. No melena or hematochezia. Patient reports an episode of aspiration on Tuesday night and started having shortness of breath yesterday morning prior to presentation which has since improved. Upper GI series/small bowel 5/5/22  Moderate to severe gastroesophageal reflux with mild presbyesophagus. Patient is status post distal esophagectomy with gastric pull-up. No mass lesion or strictures are identified.     EGD (2/15/22) by Dr. Leticia Aiken showed a normal esophago-gastric anastomosis; otherwise, normal.      Patient Active Problem List    Diagnosis Date Noted    Respiratory failure ([address] 05/04/2022     Past Medical History:   Diagnosis Date    Esophageal cancer ([address] 2008    Prostate cancer [facility])     2014    Radiation exposure       Past Surgical History:   Procedure Laterality Date    HX ESOPHAGECTOMY N/A 2008    removal of tumor from esophagus and stomach    HX PROSTATECTOMY N/A           Social History     Tobacco Use    Smoking status: Never Smoker    Smokeless tobacco: Never Used   Substance Use Topics    Alcohol use: Yes     Alcohol/week: 1.0 - 2.0 standard drink     Types: 1 - 2 Glasses of wine per week      Family History   Problem Relation Age of Onset    Cancer Mother     Cancer Father       Prior to Admission medications    Medication Sig Start Date End Date Taking? Authorizing Provider   esomeprazole (NEXIUM) 40 mg capsule Take  by mouth daily. Provider, Historical   ezetimibe (ZETIA) 10 mg tablet Take  by mouth. Provider, Historical     Current Facility-Administered Medications   Medication Dose Route Frequency    sodium chloride (NS) flush 5-40 mL  5-40 mL IntraVENous Q8H    sodium chloride (NS) flush 5-40 mL  5-40 mL IntraVENous PRN    0.9% sodium chloride infusion  75 mL/hr IntraVENous CONTINUOUS    acetaminophen (TYLENOL) tablet 650 mg  650 mg Oral Q4H PRN    heparin (porcine) injection 5,000 Units  5,000 Units SubCUTAneous Q8H    famotidine (PEPCID) tablet 20 mg  20 mg Oral BID    piperacillin-tazobactam (ZOSYN) 3.375 g in 0.9% sodium chloride (MBP/ADV) 100 mL MBP  3.375 g IntraVENous Q8H    pantoprazole (PROTONIX) injection 40 mg  40 mg IntraVENous DAILY    And    sodium chloride (NS) flush 10 mL  10 mL IntraVENous DAILY     No Known Allergies       Subjective:     Review of Systems:    A comprehensive review of systems was negative except for that written in the History of Present Illness. Objective:     Blood pressure 124/86, pulse 65, temperature 97.5 °F (36.4 °C), resp. rate 20, height 6' 2\" (1.88 m), weight 180 lb (81.6 kg), SpO2 96 %.   Temp (24hrs), Av.6 °F (36.4 °C), Min:97.5 °F (36.4 °C), Max:97.7 °F (36.5 °C)      Recent Labs     05/05/22  0246 05/04/22  1215   WBC 9.9 14.7*   HGB 13.0 14.2   HCT 39.3 42.5    274     Recent Labs     05/05/22  0246 05/04/22  1127    137   K 4.1 4.4    104   CO2 24 27   * 116*   BUN 24* 21*   CREA 1.32* 1.47*   CA 8.8 9.7   ALB  --  3.8   TBILI  --  1.3*   ALT  --  26     No results for input(s): AML, LPSE in the last 72 hours. Intake/Output Summary (Last 24 hours) at 5/5/2022 1423  Last data filed at 5/4/2022 2053  Gross per 24 hour   Intake 150 ml   Output --   Net 150 ml       _____________________  Physical Exam:     General:  Alert, cooperative, no distress, appears stated age. Eyes:   Sclera clear. Throat: Lips, mucosa, and tongue normal.   Neck: Supple, symmetrical, trachea midline. Lungs:   Clear to auscultation bilaterally. Heart:  Regular rate and rhythm. Abdomen:   Normal BS, flat, Soft, non-tender. No masses,  No organomegaly. Extremities: Extremities normal, atraumatic, no cyanosis or edema. Skin: Skin color, texture, turgor normal. No rashes or lesions. Assessment:   Active Problems:    Respiratory failure ([address] (5/4/2022)    62year old male with history of esophageal CA, s/p esophagectomy (2008) presents with worsening Reflux and chest discomfort. Plan:   Continue with PPI BID and H2 blocker per GI recs  Regular diet as tolerated  Oncology consulted  Will D/W Dr Kaylen Lovett     Thank you for allowing us to participate in the care of this patient. Total time spent with patient: 30 minutes. Signed By: Alondra Deal NP     May 5, 2022    ATTENDING ADDENDUM  I supervised the APC and reviewed the note. We discussed the plan of care  It is strange to have reflux emerge almost 14 years after esophageal resection. Does he have an acquired motility disorder? Is there a relative obstruction to the gastric outlet?   What about domperidone (sp?)

## 2022-05-05 NOTE — PROGRESS NOTES
Comprehensive Nutrition Assessment    Type and Reason for Visit: Initial    Nutrition Recommendations/Plan:   1. Diet liberalized to regular to allow most options. Advised on GI [place] diet, however familiar and pt does not seem to be able to tolerate much of anything, including liquids at this time  2. Await GI w/u as well as surgical and oncology evaluation for further recommendations       Malnutrition Assessment:  Malnutrition Status:  Severe malnutrition (05/05/22 1624)    Context:  Chronic illness     Findings of the 6 clinical characteristics of malnutrition:   Energy Intake:  75% or less est energy requirements for 1 month or longer  Weight Loss:  Greater than 7.5% over 3 months     Body Fat Loss:  Unable to assess,     Muscle Mass Loss:  Unable to assess,    Fluid Accumulation:  Unable to assess,     Strength:  Not performed        Nutrition Assessment:    Past Medical History:   Diagnosis Date    Esophageal cancer ([facility] [address]     2008    Prostate cancer [facility])     2014    Radiation exposure      PMHx includes esophageal cancer s/p XRT and esophagectomy in 8/2008 with Dr. Markus Vasquez, in remission. EGD (2/15/22) by Dr. Nancy Spencer showed a normal esophago-gastric anastomosis; otherwise, normal.  Admitted 5/4 with worsening reflux/ chest discomfort. HUEY - improving. Pt undergoing GI work-up and surgical eval today. Met with wife in room as pt was off for multiple procedures. Wife reports reflux worse at night - so pt stops eating by 4 PM, but even with lack of PO, reflux has worsened. Water even triggers symptoms. Hasn't been able to tolerate ONS. Has attempted more bland diet, smoothies, etc.   Pt already on Dexilant and Pepcid 3-4x/day without improvement. Pt has lost 25 lb since January (12% BW in 4-5 months). ?if will need PEG vs J-tube. Wife states this was discussed in ER. Pt had one in past after esophagectomy in 2008, but has been out for nearly 14 years at this point.   Not much to add at this time until work-up complete and know if symptoms can be treated empirically or surgically. Without NFPE, pt meets severe malnutrition criteria based on wt loss and PO reports alone. Will continue to follow closely. Nutritionally Significant Medications:  NS@ 75 mL/hr, Pepcid, Zosyn    Estimated Daily Nutrient Needs:  Energy Requirements Based On: Kcal/kg  Weight Used for Energy Requirements: Admission (81.6 kg)  Energy (kcal/day): 4281-2713 (25-30 kcal/kg)  Weight Used for Protein Requirements: Admission  Protein (g/day): 100-120 (1.2-1.5 gm/kg)     Fluid (ml/day): 1 mL/kcal    Nutrition Related Findings:   Edema: No data recorded    Last BM:  ,   - PTA    Wounds: None      Current Nutrition Therapies:  ADULT DIET Regular; 4 carb choices (60 gm/meal); Low Fat/Low Chol/High Fiber/BERTO; Low Sodium (2 gm); Low Potassium (Less than 3000 mg/day)  Meal Intake:   No data found. Supplement Intake:  No data found. Anthropometric Measures:  Height: 6' 2\" (188 cm)  Ideal Body Weight (IBW): 190 lbs (86 kg)  Admission Body Weight: 180 lb  Current Body Wt:  81.6 kg (180 lb), 94.7 % IBW. Bed scale  Current BMI (kg/m2): 23.1  Usual Body Weight: 93 kg (205 lb)  % Weight Change (Calculated): -12.2  Weight Adjustment: No adjustment                 BMI Category: Normal weight (BMI 18.5-24. 9)    Wt Readings from Last 10 Encounters:   05/04/22 81.6 kg (180 lb)   03/31/16 88.5 kg (195 lb)   02/24/16 88.5 kg (195 lb)         Nutrition Diagnosis:   · Inadequate oral intake related to altered GI function,swallowing difficulty as evidenced by poor intake prior to admission,weight loss      Nutrition Interventions:   Food and/or Nutrient Delivery: Modify current diet  Nutrition Education/Counseling: No recommendations at this time  Coordination of Nutrition Care: Continue to monitor while inpatient       Goals:     Goals: other (specify)  Specify Other Goals: pt will be able to tolerate PO diet with >/=75% of meals consumed within 5-7 days, or consideration for feeding tube    Nutrition Monitoring and Evaluation:   Behavioral-Environmental Outcomes: None identified  Food/Nutrient Intake Outcomes: Diet advancement/tolerance,Food and nutrient intake  Physical Signs/Symptoms Outcomes: Biochemical data,GI status,Meal time behavior,Nutrition focused physical findings,Weight,Chewing or swallowing    Discharge Planning:     Too soon to determine     Recent Labs     05/05/22 0246 05/04/22  1127   * 116*   BUN 24* 21*   CREA 1.32* 1.47*    137   K 4.1 4.4    104   CO2 24 27   CA 8.8 9.7       Recent Labs     05/04/22  1127   ALT 26   AST 36   AP 98   TBILI 1.3*   TP 8.0   ALB 3.8   GLOB 4.2*       Recent Labs     05/04/22  1600   LAC 1.2       Recent Labs     05/05/22 0246 05/04/22  1215   WBC 9.9 14.7*   HGB 13.0 14.2   HCT 39.3 42.5    274       Willow Cotter RD  Available via [email or web address]

## 2022-05-05 NOTE — PROGRESS NOTES
Hospitalist Progress Note      Hospital summary: Castillo Keane is a 62 y.o. male who presents with shortness of breath     History was probably obtained from the patient, patient reports that he has extremely bad acid reflux, for the past month or so he is on reflux has gotten worse, reports that in the last 1 week he is getting severe symptoms, reports that yesterday he felt like he aspirated, this morning started having some shortness of breath, came to the ER, patient reports he has history of esophageal cancer diagnosed in 2008, this underwent treatment for the same, and is currently in remission, reports that he had a CT scan of the abdomen pelvis and chest done about a week back and \"everything was normal\". Patient denies any other complaints or problems.    5/4/2022      Assessment/Plan:  Acute hypoxic respiratory failure - resolved   Aspiration pneumonia  - CXR: No acute abnormality  - saturating well on RA  - c/w zosyn    Acid reflux  History of esophageal cancer  - Appreciate GI input  - Upper GI series  - gen surgery and oncology consults placed   - c/w Protonix and Pepcid    HUEY on CKD - improved   - Likely prerenal  - avoid nephrotoxic medication, renally dose all medication  - c/w gentle IV hydration  - monitor renal function     Code status: Full  DVT prophylaxis: Heparin   Disposition: TBD  ----------------------------------------------    CC: GERD     S: Patient is seen and examined at bedside this AM. Wife present. He feels better. Plan for upper GI series today. Will wait for GI eval  Discussed with nursing      Review of Systems:  A comprehensive review of systems was negative.     O:  Visit Vitals  /86 (BP 1 Location: Left upper arm, BP Patient Position: At rest)   Pulse 65   Temp 97.5 °F (36.4 °C)   Resp 20   Ht 6' 2\" (1.88 m)   Wt 81.6 kg (180 lb)   SpO2 96%   BMI 23.11 kg/m²       PHYSICAL EXAM:  Gen: NAD  HEENT: anicteric sclerae, normal conjunctiva, oropharynx clear, MM moist  Neck: supple, trachea midline, no adenopathy  Heart: RRR, no MRG, no JVD, no peripheral edema  Lungs: CTA b/l, non-labored respirations  Abd: soft, NT, ND, BS+, no organomegaly  Extr: warm  Skin: dry, no rash  Neuro: CN II-XII grossly intact, normal speech, moves all extremities  Psych: normal mood, appropriate affect      Intake/Output Summary (Last 24 hours) at 5/5/2022 1443  Last data filed at 5/4/2022 2053  Gross per 24 hour   Intake 150 ml   Output --   Net 150 ml        Recent labs & imaging reviewed:  Recent Results (from the past 24 hour(s))   CULTURE, BLOOD, PAIRED    Collection Time: 05/04/22  4:00 PM    Specimen: Blood   Result Value Ref Range    Special Requests: NO SPECIAL REQUESTS      Culture result: NO GROWTH AFTER 17 HOURS     LACTIC ACID    Collection Time: 05/04/22  4:00 PM   Result Value Ref Range    Lactic acid 1.2 0.4 - 2.0 MMOL/L   COVID-19 RAPID TEST    Collection Time: 05/04/22  4:35 PM   Result Value Ref Range    Specimen source Nasopharyngeal      COVID-19 rapid test Not detected NOTD     TROPONIN-HIGH SENSITIVITY    Collection Time: 05/04/22  5:22 PM   Result Value Ref Range    Troponin-High Sensitivity 8 0 - 76 ng/L   TROPONIN-HIGH SENSITIVITY    Collection Time: 05/05/22  2:46 AM   Result Value Ref Range    Troponin-High Sensitivity 6 0 - 76 ng/L   METABOLIC PANEL, BASIC    Collection Time: 05/05/22  2:46 AM   Result Value Ref Range    Sodium 136 136 - 145 mmol/L    Potassium 4.1 3.5 - 5.1 mmol/L    Chloride 104 97 - 108 mmol/L    CO2 24 21 - 32 mmol/L    Anion gap 8 5 - 15 mmol/L    Glucose 149 (H) 65 - 100 mg/dL    BUN 24 (H) 6 - 20 MG/DL    Creatinine 1.32 (H) 0.70 - 1.30 MG/DL    BUN/Creatinine ratio 18 12 - 20      GFR est AA >60 >60 ml/min/1.73m2    GFR est non-AA 56 (L) >60 ml/min/1.73m2    Calcium 8.8 8.5 - 10.1 MG/DL   CBC WITH AUTOMATED DIFF    Collection Time: 05/05/22  2:46 AM   Result Value Ref Range    WBC 9.9 4.1 - 11.1 K/uL    RBC 4.44 4.10 - 5.70 M/uL    HGB 13.0 12.1 - 17.0 g/dL    HCT 39.3 36.6 - 50.3 %    MCV 88.5 80.0 - 99.0 FL    MCH 29.3 26.0 - 34.0 PG    MCHC 33.1 30.0 - 36.5 g/dL    RDW 13.5 11.5 - 14.5 %    PLATELET 298 226 - 026 K/uL    MPV 9.2 8.9 - 12.9 FL    NRBC 0.0 0  WBC    ABSOLUTE NRBC 0.00 0.00 - 0.01 K/uL    NEUTROPHILS 93 (H) 32 - 75 %    LYMPHOCYTES 5 (L) 12 - 49 %    MONOCYTES 2 (L) 5 - 13 %    EOSINOPHILS 0 0 - 7 %    BASOPHILS 0 0 - 1 %    IMMATURE GRANULOCYTES 0 0.0 - 0.5 %    ABS. NEUTROPHILS 9.2 (H) 1.8 - 8.0 K/UL    ABS. LYMPHOCYTES 0.5 (L) 0.8 - 3.5 K/UL    ABS. MONOCYTES 0.2 0.0 - 1.0 K/UL    ABS. EOSINOPHILS 0.0 0.0 - 0.4 K/UL    ABS. BASOPHILS 0.0 0.0 - 0.1 K/UL    ABS. IMM. GRANS. 0.0 0.00 - 0.04 K/UL    DF SMEAR SCANNED      PLATELET COMMENTS Large Platelets      RBC COMMENTS NORMOCYTIC, NORMOCHROMIC       Recent Labs     05/05/22  0246 05/04/22  1215   WBC 9.9 14.7*   HGB 13.0 14.2   HCT 39.3 42.5    274     Recent Labs     05/05/22  0246 05/04/22  1127    137   K 4.1 4.4    104   CO2 24 27   BUN 24* 21*   CREA 1.32* 1.47*   * 116*   CA 8.8 9.7     Recent Labs     05/04/22  1127   ALT 26   AP 98   TBILI 1.3*   TP 8.0   ALB 3.8   GLOB 4.2*     No results for input(s): INR, PTP, APTT, INREXT in the last 72 hours. No results for input(s): FE, TIBC, PSAT, FERR in the last 72 hours. No results found for: FOL, RBCF   No results for input(s): PH, PCO2, PO2 in the last 72 hours. No results for input(s): CPK, CKNDX, TROIQ in the last 72 hours.     No lab exists for component: CPKMB  No results found for: CHOL, CHOLX, CHLST, CHOLV, HDL, HDLP, LDL, LDLC, DLDLP, TGLX, TRIGL, TRIGP, CHHD, CHHDX  No results found for: [facility]  Lab Results   Component Value Date/Time    Color YELLOW 07/17/2009 02:26 AM    Appearance CLEAR 07/17/2009 02:26 AM    Specific gravity 1.022 07/17/2009 02:26 AM    pH (UA) 5.5 07/17/2009 02:26 AM    Protein TRACE (A) 07/17/2009 02:26 AM    Glucose NEGATIVE  07/17/2009 02:26 AM    Ketone NEGATIVE  07/17/2009 02:26 AM    Bilirubin NEGATIVE  07/17/2009 02:26 AM    Urobilinogen 1.0 07/17/2009 02:26 AM    Nitrites NEGATIVE  07/17/2009 02:26 AM    Leukocyte Esterase NEGATIVE  07/17/2009 02:26 AM    Epithelial cells 0-5 07/17/2009 02:26 AM    Bacteria NEGATIVE  07/17/2009 02:26 AM    WBC 0-4 07/17/2009 02:26 AM    RBC 10-20 07/17/2009 02:26 AM       Med list reviewed  Current Facility-Administered Medications   Medication Dose Route Frequency    sodium chloride (NS) flush 5-40 mL  5-40 mL IntraVENous Q8H    sodium chloride (NS) flush 5-40 mL  5-40 mL IntraVENous PRN    0.9% sodium chloride infusion  75 mL/hr IntraVENous CONTINUOUS    acetaminophen (TYLENOL) tablet 650 mg  650 mg Oral Q4H PRN    heparin (porcine) injection 5,000 Units  5,000 Units SubCUTAneous Q8H    famotidine (PEPCID) tablet 20 mg  20 mg Oral BID    piperacillin-tazobactam (ZOSYN) 3.375 g in 0.9% sodium chloride (MBP/ADV) 100 mL MBP  3.375 g IntraVENous Q8H    pantoprazole (PROTONIX) injection 40 mg  40 mg IntraVENous DAILY    And    sodium chloride (NS) flush 10 mL  10 mL IntraVENous DAILY       Care Plan discussed with:  Patient/Family, Nurse and     Quiana Orellana MD  Internal Medicine  Date of Service: 5/5/2022

## 2022-05-06 ENCOUNTER — HOSPITAL ENCOUNTER (OUTPATIENT)
Dept: GENERAL RADIOLOGY | Age: 57
Discharge: HOME | End: 2022-05-06
Attending: NURSE PRACTITIONER

## 2022-05-06 VITALS
DIASTOLIC BLOOD PRESSURE: 60 MMHG | HEART RATE: 76 BPM | SYSTOLIC BLOOD PRESSURE: 110 MMHG | RESPIRATION RATE: 15 BRPM | WEIGHT: 180 LBS | HEIGHT: 74 IN | OXYGEN SATURATION: 94 % | BODY MASS INDEX: 23.1 KG/M2 | TEMPERATURE: 98 F

## 2022-05-06 DIAGNOSIS — K21.9 ESOPHAGEAL REFLUX: ICD-10-CM

## 2022-05-06 LAB
ANION GAP SERPL CALC-SCNC: 7 MMOL/L (ref 5–15)
BUN SERPL-MCNC: 29 MG/DL (ref 6–20)
BUN/CREAT SERPL: 20 (ref 12–20)
CALCIUM SERPL-MCNC: 8.9 MG/DL (ref 8.5–10.1)
CHLORIDE SERPL-SCNC: 109 MMOL/L (ref 97–108)
CO2 SERPL-SCNC: 23 MMOL/L (ref 21–32)
CREAT SERPL-MCNC: 1.45 MG/DL (ref 0.7–1.3)
ERYTHROCYTE [DISTWIDTH] IN BLOOD BY AUTOMATED COUNT: 13.9 % (ref 11.5–14.5)
GLUCOSE SERPL-MCNC: 100 MG/DL (ref 65–100)
HCT VFR BLD AUTO: 35.8 % (ref 36.6–50.3)
HGB BLD-MCNC: 12 G/DL (ref 12.1–17)
MAGNESIUM SERPL-MCNC: 1.9 MG/DL (ref 1.6–2.4)
MCH RBC QN AUTO: 29.8 PG (ref 26–34)
MCHC RBC AUTO-ENTMCNC: 33.5 G/DL (ref 30–36.5)
MCV RBC AUTO: 88.8 FL (ref 80–99)
NRBC # BLD: 0 K/UL (ref 0–0.01)
NRBC BLD-RTO: 0 PER 100 WBC
PHOSPHATE SERPL-MCNC: 2.8 MG/DL (ref 2.6–4.7)
PLATELET # BLD AUTO: 235 K/UL (ref 150–400)
PMV BLD AUTO: 9.4 FL (ref 8.9–12.9)
POTASSIUM SERPL-SCNC: 3.7 MMOL/L (ref 3.5–5.1)
RBC # BLD AUTO: 4.03 M/UL (ref 4.1–5.7)
SODIUM SERPL-SCNC: 139 MMOL/L (ref 136–145)
WBC # BLD AUTO: 9.1 K/UL (ref 4.1–11.1)

## 2022-05-06 PROCEDURE — 84100 ASSAY OF PHOSPHORUS: CPT

## 2022-05-06 PROCEDURE — 36415 COLL VENOUS BLD VENIPUNCTURE: CPT

## 2022-05-06 PROCEDURE — 74011000258 HC RX REV CODE- 258: Performed by: FAMILY MEDICINE

## 2022-05-06 PROCEDURE — 80048 BASIC METABOLIC PNL TOTAL CA: CPT

## 2022-05-06 PROCEDURE — 74011250636 HC RX REV CODE- 250/636: Performed by: PHYSICIAN ASSISTANT

## 2022-05-06 PROCEDURE — C9113 INJ PANTOPRAZOLE SODIUM, VIA: HCPCS | Performed by: FAMILY MEDICINE

## 2022-05-06 PROCEDURE — 85027 COMPLETE CBC AUTOMATED: CPT

## 2022-05-06 PROCEDURE — 74011000250 HC RX REV CODE- 250: Performed by: FAMILY MEDICINE

## 2022-05-06 PROCEDURE — 74011250636 HC RX REV CODE- 250/636: Performed by: FAMILY MEDICINE

## 2022-05-06 PROCEDURE — 83735 ASSAY OF MAGNESIUM: CPT

## 2022-05-06 PROCEDURE — 94760 N-INVAS EAR/PLS OXIMETRY 1: CPT

## 2022-05-06 PROCEDURE — 74011250637 HC RX REV CODE- 250/637: Performed by: FAMILY MEDICINE

## 2022-05-06 RX ORDER — AMOXICILLIN AND CLAVULANATE POTASSIUM 875; 125 MG/1; MG/1
1 TABLET, FILM COATED ORAL EVERY 12 HOURS
Qty: 10 TABLET | Refills: 0 | Status: SHIPPED | OUTPATIENT
Start: 2022-05-06 | End: 2022-05-11

## 2022-05-06 RX ORDER — METOCLOPRAMIDE 10 MG/1
10 TABLET ORAL
Qty: 40 TABLET | Refills: 0 | Status: SHIPPED | OUTPATIENT
Start: 2022-05-06 | End: 2022-05-06 | Stop reason: SDUPTHER

## 2022-05-06 RX ORDER — METOCLOPRAMIDE 10 MG/1
10 TABLET ORAL
Qty: 40 TABLET | Refills: 0 | Status: SHIPPED | OUTPATIENT
Start: 2022-05-06 | End: 2022-05-16

## 2022-05-06 RX ORDER — AMOXICILLIN AND CLAVULANATE POTASSIUM 875; 125 MG/1; MG/1
1 TABLET, FILM COATED ORAL EVERY 12 HOURS
Qty: 10 TABLET | Refills: 0 | Status: SHIPPED | OUTPATIENT
Start: 2022-05-06 | End: 2022-05-06 | Stop reason: SDUPTHER

## 2022-05-06 RX ADMIN — PANTOPRAZOLE SODIUM 40 MG: 40 INJECTION, POWDER, FOR SOLUTION INTRAVENOUS at 08:36

## 2022-05-06 RX ADMIN — METOCLOPRAMIDE HYDROCHLORIDE 10 MG: 5 INJECTION INTRAMUSCULAR; INTRAVENOUS at 03:35

## 2022-05-06 RX ADMIN — SODIUM CHLORIDE, PRESERVATIVE FREE 10 ML: 5 INJECTION INTRAVENOUS at 06:00

## 2022-05-06 RX ADMIN — SODIUM CHLORIDE, PRESERVATIVE FREE 10 ML: 5 INJECTION INTRAVENOUS at 08:36

## 2022-05-06 RX ADMIN — PIPERACILLIN SODIUM AND TAZOBACTAM SODIUM 3.38 G: 3; 375 INJECTION, POWDER, FOR SOLUTION INTRAVENOUS at 07:16

## 2022-05-06 RX ADMIN — Medication 10 ML: at 08:36

## 2022-05-06 NOTE — PROGRESS NOTES
Hematology-Oncology Progress Note    Yohannes Thacker  1965  [id number]  5/6/2022    Follow-up for: esophageal cancer     []        Chart notes since last visit reviewed   []        Medications reviewed for allergies and interactions       Case discussed with the following:         []                            []        Nursing Staff                                                                         []        Pathologist                                                                        [x]        FAMILY      Subjective:     Spoke with patient who complains of: had some more reflux last night    Objective:   Patient Vitals for the past 24 hrs:   BP Temp Pulse Resp SpO2 Height   05/06/22 0604 -- -- 78 -- -- --   05/06/22 0408 -- -- 82 -- -- --   05/06/22 0330 106/67 97.8 °F (36.6 °C) 77 14 94 % --   05/06/22 0249 -- -- -- -- 96 % --   05/06/22 0208 -- -- 81 -- -- --   05/05/22 2304 106/73 97.8 °F (36.6 °C) 77 16 93 % --   05/05/22 2204 -- -- 96 -- -- --   05/05/22 2100 113/80 97.8 °F (36.6 °C) 77 15 96 % --   05/05/22 2035 -- -- 84 -- -- --   05/05/22 2000 -- -- 83 -- 93 % --   05/05/22 1900 -- -- 79 -- 94 % --   05/05/22 1800 -- -- 75 16 96 % --   05/05/22 1700 -- -- 73 12 96 % --   05/05/22 1620 -- -- -- -- -- 6' 2\" (1.88 m)   05/05/22 1600 -- -- 75 13 95 % --   05/05/22 1412 -- -- 65 -- -- --   05/05/22 1344 124/86 97.5 °F (36.4 °C) 78 20 -- --   05/05/22 1030 104/77 97.6 °F (36.4 °C) 73 18 96 % --   05/05/22 1007 -- -- 73 -- -- --   05/05/22 1000 109/76 -- 72 15 93 % --       REVIEW OF SYSTEMS:    Constitutional: negative fever, negative chills, negative weight loss  Eyes:   negative visual changes  ENT:   negative sore throat, tongue or lip swelling  Respiratory:  negative cough, negative dyspnea  Cards:  negative for chest pain, palpitations, lower extremity edema  GI:   negative for nausea, vomiting, diarrhea, and abdominal pain  Neuro:  negative for headaches, dizziness, vertigo  [] Full ROS o/w normal/non contributor    Constitutional:  Patient looks  []        Sick  []        Frail  [x]        Better                                                 []        Depressed    HEENT:  [x]   NC                         []   AT               []    ALOPECIA           Eyes: []   Normal               []    Icteric  Oropharynx: []    Normal                  []  Thrush               []   Dry  Mucositis: []    None                 Grade: []        I  []        II  []        III  []        IV  Neck:   [x]   Supple                  []  Rigid               JVD:    []   ABSENT       []   PRESENT  Lymphadenopathy:   []   None Noted            []   PRESENT    Chest:  Aerating well  CV:             []   Regular              []  Irregular               []   Tachy                []   Murmur  Abdominal:   []    Soft              []   NON-tender               []   Tender      BS:    []   ABSENT                   []   PRESENT  Liver:     []  NON-palp                  []   EDGE- palp  Spleen: []   NON-palp                   []  EDGE - palp  Mass:   []   ABSENT                          []  PRESENT  Extr:    []  Lymphedema             []   Cyanosis      []  Clubbing  Edema:     [x]   NONE       []   PRESENT  Skin:  Intact [x]           Purpura []        Rash: []   ABSENT       []  PRESENT  Neuro:  []        Normal  []        Confused      Available labs reviewed:  Labs:    Recent Results (from the past 24 hour(s))   METABOLIC PANEL, BASIC    Collection Time: 05/06/22  3:42 AM   Result Value Ref Range    Sodium 139 136 - 145 mmol/L    Potassium 3.7 3.5 - 5.1 mmol/L    Chloride 109 (H) 97 - 108 mmol/L    CO2 23 21 - 32 mmol/L    Anion gap 7 5 - 15 mmol/L    Glucose 100 65 - 100 mg/dL    BUN 29 (H) 6 - 20 MG/DL    Creatinine 1.45 (H) 0.70 - 1.30 MG/DL    BUN/Creatinine ratio 20 12 - 20      GFR est AA >60 >60 ml/min/1.73m2    GFR est non-AA 50 (L) >60 ml/min/1.73m2    Calcium 8.9 8.5 - 10.1 MG/DL   CBC W/O DIFF    Collection Time: 05/06/22  3:42 AM   Result Value Ref Range    WBC 9.1 4.1 - 11.1 K/uL    RBC 4.03 (L) 4.10 - 5.70 M/uL    HGB 12.0 (L) 12.1 - 17.0 g/dL    HCT 35.8 (L) 36.6 - 50.3 %    MCV 88.8 80.0 - 99.0 FL    MCH 29.8 26.0 - 34.0 PG    MCHC 33.5 30.0 - 36.5 g/dL    RDW 13.9 11.5 - 14.5 %    PLATELET 552 945 - 675 K/uL    MPV 9.4 8.9 - 12.9 FL    NRBC 0.0 0  WBC    ABSOLUTE NRBC 0.00 0.00 - 0.01 K/uL       Available Xrays reviewed:    Chemotherapy monitored and toxicities assessed:    Assessment and Plan   1. Esophageal cancer dx'd in 2008 tx with chemo/xrt/esophagectomy and gastric pull through. . pt has persistent reflux/bloating after meals. CT in the office two weeks ago showed no sign of recurrence. egd done in Feb. Showed no sign of recurrence. 2. Reflux. Susie Day done yesterday shows no acute findings. Severe reflux noted. Awaiting GI input .   3. Kranthi Wu MD

## 2022-05-06 NOTE — DISCHARGE SUMMARY
Discharge Summary     Patient:  Claudio Madrid       MRN: [id number]       YOB: 1965       Age: 62 y.o. Date of admission:  5/4/2022    Date of discharge:  5/6/2022    Primary care provider: Dr. Cruz Ortiz MD    Admitting provider:  Mayte Stiles MD    Discharging provider:  Anthony Horvath [address] [phone]. If unavailable, call [phone] and ask the  to page the triage hospitalist.    Consultations  · IP CONSULT TO GASTROENTEROLOGY  · IP CONSULT TO ONCOLOGY  · IP CONSULT TO GENERAL SURGERY    Procedures  · * No surgery found *    Discharge destination: home. The patient is stable for discharge. Admission diagnosis  · Respiratory failure ([facility] [J96.90]    Current Discharge Medication List      START taking these medications    Details   metoclopramide HCl (Reglan) 10 mg tablet Take 1 Tablet by mouth Before breakfast, lunch, dinner and at bedtime for 10 days. Qty: 40 Tablet, Refills: 0  Start date: 5/6/2022, End date: 5/16/2022      amoxicillin-clavulanate (Augmentin) 875-125 mg per tablet Take 1 Tablet by mouth every twelve (12) hours for 5 days. Qty: 10 Tablet, Refills: 0  Start date: 5/6/2022, End date: 5/11/2022         CONTINUE these medications which have NOT CHANGED    Details   dexlansoprazole (Dexilant) 60 mg CpDB capsule (delayed release) Take 60 mg by mouth daily. fluticasone propionate (Flonase Allergy Relief) 50 mcg/actuation nasal spray 2 Sprays by Both Nostrils route daily as needed. multivitamin (ONE A DAY) tablet Take 1 Tablet by mouth daily. famotidine (Pepcid) 20 mg tablet Take 20 mg by mouth three (3) times daily as needed for Heartburn.  Usually takes with lunch, dinner and evening time             Follow-up Information     Follow up With Specialties Details Why Contact Info    Cruz Ortiz MD Family Medicine In 1 week  [id number]  [address] [place] [address]   [phone]      Juve Diallo MD Gastroenterology In 1 week  [place] [address]  [phone]      Kaelyn Low MD General Surgery, Oncology, Surgery General, Surgical Oncology  As needed [address] [place] [address]  [phone]            Final discharge diagnoses and brief hospital course  Jerome Read a 62 y. o. male who presents with shortness of breath     History was probably obtained from the patient, patient reports that he has extremely bad acid reflux, for the past month or so he is on reflux has gotten worse, reports that in the last 1 week he is getting severe symptoms, reports that yesterday he felt like he aspirated, this morning started having some shortness of breath, came to the ER, patient reports he has history of esophageal cancer diagnosed in 2008, this underwent treatment for the same, and is currently in remission, reports that he had a CT scan of the abdomen pelvis and chest done about a week back and \"everything was normal\".  Patient denies any other complaints or problems. Acute hypoxic respiratory failure - resolved   Aspiration pneumonia  - CXR: No acute abnormality  - saturating well on RA  - c/w zosyn changed to po Augmentin on dc      Acid reflux  History of esophageal cancer  - Appreciate GI input  - Upper GI series: Moderate to severe gastroesophageal reflux with mild presbyesophagus. -appreciate gen surgery and oncology input   - c/w Protonix and Pepcid  - started on Reglan   - discussed with GI - ok to DC  - may need J tube with TFs - discuss as outpt      HUEY on CKD stage 3- improved   - Likely prerenal  - avoid nephrotoxic medication, renally dose all medication  - monitor renal function    Discharge recommendations:  PCP f/u in 1 week  GI in 1-2 weeks  Gen surg and oncology as needed     Discharge plan discussed in detail with patient. He understood and agreed.     High risk for readmission         Physical examination at discharge  Visit Vitals  /60   Pulse 76   Temp 98 °F (36.7 °C)   Resp 15   Ht 6' 2\" (1.88 m)   Wt 81.6 kg (180 lb)   SpO2 94%   BMI 23.11 kg/m²     Gen: NAD  HEENT: anicteric sclerae, normal conjunctiva, oropharynx clear, MM moist  Neck: supple, trachea midline, no adenopathy  Heart: RRR, no MRG, no JVD, no peripheral edema  Lungs: CTA b/l, non-labored respirations  Abd: soft, NT, ND, BS+, no organomegaly  Extr: warm  Skin: dry, no rash  Neuro: CN II-XII grossly intact, normal speech, moves all extremities  Psych: normal mood, appropriate affect    Pertinent imaging studies:    Per EMR     Recent Labs     05/06/22  0342 05/05/22  0246 05/04/22  1215   WBC 9.1 9.9 14.7*   HGB 12.0* 13.0 14.2   HCT 35.8* 39.3 42.5    239 274     Recent Labs     05/06/22  0342 05/05/22  0246 05/04/22  1127    136 137   K 3.7 4.1 4.4   * 104 104   CO2 23 24 27   BUN 29* 24* 21*   CREA 1.45* 1.32* 1.47*    149* 116*   CA 8.9 8.8 9.7     Recent Labs     05/04/22  1127   AP 98   TP 8.0   ALB 3.8   GLOB 4.2*     No results for input(s): INR, PTP, APTT, INREXT in the last 72 hours. No results for input(s): FE, TIBC, PSAT, FERR in the last 72 hours. No results for input(s): PH, PCO2, PO2 in the last 72 hours. No results for input(s): CPK, CKMB in the last 72 hours. No lab exists for component: TROPONINI  No components found for: [place]    Chronic Diagnoses:    Problem List as of 5/6/2022 Date Reviewed: 3/31/2016          Codes Class Noted - Resolved    Severe protein-calorie malnutrition ([facility] ICD-10-CM: E43  ICD-9-CM: 262  5/5/2022 - Present        Respiratory failure ([address] ICD-10-CM: J96.90  ICD-9-CM: 518.81  5/4/2022 - Present              Time spent on discharge related activities today greater than 30 minutes. Signed:  Becky Lopez MD                 Hospitalist, Internal Medicine      Cc:  Yasir Unger MD

## 2022-05-06 NOTE — CONSULTS
[address]    Name:  Kirill Orozco  MR#:  [id number]  :  1965  ACCOUNT #:  [de-identified]  DATE OF SERVICE:  2022    HISTORY OF PRESENT ILLNESS:  The patient is a 78-year-old gentleman with a history of esophageal cancer, who is seen for medical oncology evaluation after admission for chest pain. This gentleman was treated for locally advanced esophageal cancer in  with chemo, radiation and surgery. He has had no evidence of recurrent disease since that time. He has battled difficulties with dumping syndrome early on and more recently gastritis, esophagitis-type symptoms. He has had a recent EGD performed in 2022, which failed to identify any active process. He has developed gradually worsening reflux-type symptoms and chest pressure, limiting his p.o. intake associated with 10 to 20 pounds of weight loss. Over 24 hours prior to admission, the discomfort crescendoed, and he came to the emergency room with those symptoms. Since his admission, he has felt better. He has been seen by Surgery as well as Gastroenterology. He had a chest x-ray which failed to identify an acute process. PAST MEDICAL HISTORY:  Includes  1. Locally advanced esophageal carcinoma, diagnosed 2008. 2.  He has prostate cancer, diagnosed . 3.  Hypercholesterolemia. 4.  Seasonal allergies. PAST SURGICAL HISTORY:  Includes  1. Esophagectomy. 2.  Multiple EGDs. 3.  Robotic prostatectomy in . SOCIAL HISTORY:  He is . He does not smoke and has occasional alcohol use. FAMILY HISTORY:  His mother had breast cancer. Father had prostate cancer. ALLERGIES:  NO KNOWN DRUG ALLERGIES. REVIEW OF SYSTEMS:  As noted above, otherwise noncontributory. PHYSICAL EXAMINATION:  GENERAL:  Pleasant, well-developed, well-nourished male in no apparent distress. VITAL SIGNS:  Stable. He is afebrile. HEENT:  EOMI. Nonicteric sclerae. NECK:  Supple.   LUNGS: Clear.  CARDIAC:  Regular rate and rhythm. No murmur. ABDOMEN:  Soft, nontender. No hepatosplenomegaly noted. EXTREMITIES:  No edema. DIAGNOSTIC DATA:  Chest x-ray dated 05/04/2022 shows no acute process. Upper GI dated 05/05/2022 shows moderate to severe gastroesophageal reflux. No mass lesion identified. This gentleman had a CT scan of the chest and abdomen performed as an outpatient last week at Dr. Pastor Haque office and was reported as unremarkable. IMPRESSION AND PLAN:  He has been seen by both Surgery and Gastroenterology during this admission and has been placed on proton pump inhibitor b.i.d. as well as H2 blockade. We will defer to Gastroenterology whether or not this patient needs to have another endoscopy. At the current time, there is no evidence of recurrent malignancy. We will follow with you on behalf of [employer].       Era Bess MD      [id number]  D:  05/05/2022 17:00  T:  05/05/2022 20:18  JOB #:  [id number]

## 2022-05-06 NOTE — DISCHARGE INSTRUCTIONS
Please bring this form with you to show your primary care provider at your follow-up appointment. Primary care provider:  Dr. Patti Cotto MD    Discharging provider:  Michele Marion MD    You have been admitted to the hospital with the following diagnoses:  · Respiratory failure ([address] [J96.90]    FOLLOW-UP CARE RECOMMENDATIONS:    APPOINTMENTS:  · Follow-up with primary care provider, Dr. aPtti Cotto MD  -  Please call [phone] shortly after discharge to set up an appointment to be seen in 1 week   · GI in 1-2 weeks     FOLLOW-UP TESTS recommended: bmp in 1 week     PENDING TEST RESULTS:  At the time of your discharge the following test results are still pending: none   Please make sure you review these results with your outpatient follow-up provider(s). SYMPTOMS to watch for: chest pain, shortness of breath, fever, chills, nausea, vomiting, diarrhea, change in mentation, falling, weakness, bleeding. DIET/what to eat:  Regular Diet    ACTIVITY:  Activity as tolerated    What to do if new or unexpected symptoms occur? If you experience any of the above symptoms (or should other concerns or questions arise after discharge) please call your primary care physician. Return to the emergency room if you cannot get hold of your doctor. · It is very important that you keep your follow-up appointment(s). · Please bring discharge papers, medication list (and/or medication bottles) to your follow-up appointments for review by your outpatient provider(s). · Please check the list of medications and be sure it includes every medication (even non-prescription medications) that your provider wants you to take. · It is important that you take the medication exactly as they are prescribed. · Keep your medication in the bottles provided by the pharmacist and keep a list of the medication names, dosages, and times to be taken in your wallet.    · Do not take other medications without consulting your doctor. · If you have any questions about your medications or other instructions, please talk to your nurse or care provider before you leave the hospital.    I understand that if any problems occur once I am at home I am to contact my physician. These instructions were explained to me and I had the opportunity to ask questions.         Gastroesophageal Reflux Disease (GERD): Care Instructions  Overview     Gastroesophageal reflux disease (GERD) is the backward flow of stomach acid into the esophagus. The esophagus is the tube that leads from your throat to your stomach. A one-way valve prevents the stomach acid from backing up into this tube. But when you have GERD, this valve does not close tightly enough. This can also cause pain and swelling in your esophagus. (This is called esophagitis.)  If you have mild GERD symptoms including heartburn, you may be able to control the problem with antacids or over-the-counter medicine. You can also make lifestyle changes to help reduce your symptoms. These include changing your diet and eating habits, such as not eating late at night and losing weight. Follow-up care is a key part of your treatment and safety. Be sure to make and go to all appointments, and call your doctor if you are having problems. It's also a good idea to know your test results and keep a list of the medicines you take. How can you care for yourself at home? · Take your medicines exactly as prescribed. Call your doctor if you think you are having a problem with your medicine. · Your doctor may recommend over-the-counter medicine. For mild or occasional indigestion, antacids, such as Tums, Gaviscon, Mylanta, or Maalox, may help. Your doctor also may recommend over-the-counter acid reducers, such as famotidine (Pepcid AC), cimetidine (Tagamet HB), or omeprazole (Prilosec). Read and follow all instructions on the label. If you use these medicines often, talk with your doctor.   · Change your eating habits. ? It's best to eat several small meals instead of two or three large meals. ? After you eat, wait 2 to 3 hours before you lie down. ? Avoid foods that make your symptoms worse. These may include chocolate, mint, alcohol, pepper, spicy foods, high-fat foods, or drinks with caffeine in them, such as tea, coffee, ro, or energy drinks. If your symptoms are worse after you eat a certain food, you may want to stop eating it to see if your symptoms get better. · Do not smoke or chew tobacco. Smoking can make GERD worse. If you need help quitting, talk to your doctor about stop-smoking programs and medicines. These can increase your chances of quitting for good. · If you have GERD symptoms at night, raise the head of your bed 6 to 8 inches by putting the frame on blocks or placing a foam wedge under the head of your mattress. (Adding extra pillows does not work.)  · Do not wear tight clothing around your middle. · Lose weight if you need to. Losing just 5 to 10 pounds can help. When should you call for help? Call your doctor now or seek immediate medical care if:    · You have new or different belly pain.     · Your stools are black and tarlike or have streaks of blood. Watch closely for changes in your health, and be sure to contact your doctor if:    · Your symptoms have not improved after 2 days.     · Food seems to catch in your throat or chest.   Where can you learn more? Go to http://www.gray.com/  Enter O440 in the search box to learn more about \"Gastroesophageal Reflux Disease (GERD): Care Instructions. \"  Current as of: September 8, 2021               Content Version: 13.2  © 8298-2959 [employer]. Care instructions adapted under license by [employer] (which disclaims liability or warranty for this information).  If you have questions about a medical condition or this instruction, always ask your healthcare professional. [employer] disclaims any warranty or liability for your use of this information.

## 2022-05-06 NOTE — PROGRESS NOTES
Transition of Care Plan   RUR: 6%   Disposition: The disposition plan is home with family assistance   F/U with PCP/Specialist     Transport: wife      Patient has d/c order placed and is returning home with his wife. No needs from Case Management.       11:06 AM  Taty Aiken, LEO

## 2022-05-06 NOTE — PROGRESS NOTES
Attempted to schedule hospital follow up appointment. Awaiting call back from the office with appointment information. Melissa Amaro, Care Management Assistant. Hospital follow-up PCP transitional care appointment has been scheduled with Dr. Jacqui Cueva for Friday, 5/13/22 at 3:30 p.m. This is the earliest appointment available. Pending patient discharge. Melissa Amaro Care Management Assistant.

## 2022-05-06 NOTE — PROGRESS NOTES
[address]  [address] [facility], [address]       GI PROGRESS NOTE  [facility]  [phone] NP in-hospital cell phone M-F until 4:30  After 5pm or on weekends, please call  for physician on call      NAME: Jenny Liu   :  1965   MRN:  [id number]       Subjective:   Still having reflux, limiting PO intake. Reflex less disruptive to sleep past 2 nights. Objective:     VITALS:   Last 24hrs VS reviewed since prior progress note. Most recent are:  Visit Vitals  /60   Pulse 76   Temp 98 °F (36.7 °C)   Resp 15   Ht 6' 2\" (1.88 m)   Wt 81.6 kg (180 lb)   SpO2 94%   BMI 23.11 kg/m²       PHYSICAL EXAM:  General: Cooperative, no acute distress    Neurologic:  Alert and oriented X 3. HEENT: EOMI, no scleral icterus   Lungs:  No increased WOB  Heart:  Regular rate  Abdomen: Soft, non-distended, no tenderness. Extremities: warm  Psych:   Good insight. Not anxious or agitated.     Lab Data Reviewed:     Recent Results (from the past 24 hour(s))   METABOLIC PANEL, BASIC    Collection Time: 22  3:42 AM   Result Value Ref Range    Sodium 139 136 - 145 mmol/L    Potassium 3.7 3.5 - 5.1 mmol/L    Chloride 109 (H) 97 - 108 mmol/L    CO2 23 21 - 32 mmol/L    Anion gap 7 5 - 15 mmol/L    Glucose 100 65 - 100 mg/dL    BUN 29 (H) 6 - 20 MG/DL    Creatinine 1.45 (H) 0.70 - 1.30 MG/DL    BUN/Creatinine ratio 20 12 - 20      GFR est AA >60 >60 ml/min/1.73m2    GFR est non-AA 50 (L) >60 ml/min/1.73m2    Calcium 8.9 8.5 - 10.1 MG/DL   CBC W/O DIFF    Collection Time: 22  3:42 AM   Result Value Ref Range    WBC 9.1 4.1 - 11.1 K/uL    RBC 4.03 (L) 4.10 - 5.70 M/uL    HGB 12.0 (L) 12.1 - 17.0 g/dL    HCT 35.8 (L) 36.6 - 50.3 %    MCV 88.8 80.0 - 99.0 FL    MCH 29.8 26.0 - 34.0 PG    MCHC 33.5 30.0 - 36.5 g/dL    RDW 13.9 11.5 - 14.5 %    PLATELET 154 964 - 146 K/uL    MPV 9.4 8.9 - 12.9 FL    NRBC 0.0 0  WBC    ABSOLUTE NRBC 0.00 0.00 - 0.01 K/uL     IMPRESSION  Moderate to severe gastroesophageal reflux with mild presbyesophagus. Patient is status post distal esophagectomy with gastric pull-up. No mass lesion or strictures are identified. EGD (2/15/22) by Dr. Karolyn Ibrahim showed a normal esophago-gastric anastomosis; otherwise, normal.  A repeat EGD was recommended in 1 year. Assessment:     · Reflux: EGD 2/2022 and UGI as above. · History of esophageal cancer  · Acute respiratory failure: CXR (5/4/22): no acute abnormality.    · Acute kidney injury on chronic kidney disease     Patient Active Problem List   Diagnosis Code    Respiratory failure ([facility] J96.90    Severe protein-calorie malnutrition ([facility] E43     Plan:     · BID PPI  · H2 blocker  · 14 day trial Reglan   · Okay for discharge, has GI follow-up 5/19/22  · Surgery and oncology following     Signed By: Dolores Spann NP     5/6/2022  10:13 AM         Agree with above  May d/c

## 2022-05-06 NOTE — PROGRESS NOTES
Physician Progress Note      PATIENT:               Christiano Douglas  CSN #:                  [id number]  :                       1965  ADMIT DATE:       2022 11:43 AM  DISCH DATE:  RESPONDING  PROVIDER #:        Mark Arredondo MD        QUERY TEXT:    Stage of Chronic Kidney Disease: Please provide further specificity, if known. Clinical indicators include: bun, creatinine, gfr, ckd, chronic kidney disease, bun/creatinine  Options provided:  -- Chronic kidney disease stage 1  -- Chronic kidney disease stage 2  -- Chronic kidney disease stage 3  -- Chronic kidney disease stage 3a  -- Chronic kidney disease stage 3b  -- Chronic kidney disease stage 4  -- Chronic kidney disease stage 5  -- Chronic kidney disease stage 5, requiring dialysis  -- End stage renal disease  -- Other - I will add my own diagnosis  -- Disagree - Not applicable / Not valid  -- Disagree - Clinically Unable to determine / Unknown        PROVIDER RESPONSE TEXT:    The patient has chronic kidney disease stage 3. QUERY TEXT:    Patient admitted with severe GERD, with noted weight loss of 25 lbs since January. If possible, please document in progress notes and discharge summary if you are evaluating and /or treating any of the following: The medical record reflects the following:  Risk Factors: severe GERD  Clinical Indicators: admitted with increased SOB and cough, noted to have weight loss of 25 lbs for 12% weight loss since 2022. Pt has intake of less than 75% of estimated energy requirements for > 1 month. Per RD documentation pt meeting criteria for severe malnutrition based on inadequate po intake related to altered GI function and swallowing difficulty aeb poor intake and weight loss. .  Treatment: pt being evaluated for possible feeding tube, PPI, elevation HOB. GI consult. RD to follow. ASPEN Criteria:  https://aspenjournals. onlinelibrary. garcia. com/doi/full/10.1177/9049207110273668    Thank you,  Shira Bojorquez RN  Clinical Documentation  [phone]  Options provided:  -- Protein calorie malnutrition severe  -- Protein calorie malnutrition unspecified  -- Other - I will add my own diagnosis  -- Disagree - Not applicable / Not valid  -- Disagree - Clinically unable to determine / Unknown  -- Refer to Clinical Documentation Reviewer    PROVIDER RESPONSE TEXT:    This patient has severe protein calorie malnutrition.     Query created by: Cruz Hays on 5/6/2022 12:22 PM      Electronically signed by:  Mark Arredondo MD 5/6/2022 12:25 PM

## 2022-05-09 LAB
BACTERIA SPEC CULT: NORMAL
SERVICE CMNT-IMP: NORMAL

## 2022-05-09 NOTE — PROGRESS NOTES
Physician Progress Note      PATIENT:               Caroline Shah  CSN #:                  [id number]  :                       1965  ADMIT DATE:       2022 11:43 AM  DISCH DATE:        2022 6:46 PM  RESPONDING  PROVIDER #:        Alistair Leone MD          QUERY TEXT:    Noted documentation of HUEY on CKD 3, with Creat 1.47 on admission, and 1.45 on discharge. In order to support the diagnosis of HUEY, please include additional clinical indicators in your documentation. ? Or please document if the diagnosis of HUEY has been ruled out after further study. The medical record reflects the following:  Risk Factors: hx of CKD 3, dehydration  Clinical Indicators: admitted with difficulty eating d/t severe reflux symptoms, and was found to have dehydration. Creat 1.47 on admission, with HUEY documented. However, pt has known hx of CKD 3, and Creat was 1.45 at time of discharge. Treatment: IVF hydration, monitoring of labs, avoidance of nephrotoxic medications. Defined by Kidney Disease Improving Global Outcomes (KDIGO) clinical practice guideline for acute kidney injury:  -Increase in SCr by greater than or equal to 0.3 mg/dl within 48 hours; or  -Increase or decrease in SCr to greater than or equal to 1.5 times baseline, which is known or presumed to have occurred within the prior 7 days; or  -Urine volume < 0.5ml/kg/h for 6 hours. Thank you,  Ivis Dasilva RN  Clinical Documentation  [phone]  Options provided:  -- Acute kidney injury evidenced by, Please document evidence as well as a numerical baseline creatinine, if known. -- Acute kidney injury ruled out after study  -- Other - I will add my own diagnosis  -- Disagree - Not applicable / Not valid  -- Disagree - Clinically unable to determine / Unknown  -- Refer to Clinical Documentation Reviewer    PROVIDER RESPONSE TEXT:    Acute kidney injury was ruled out after study.     Query created by: Tavares Dillon on 2022 10:56 AM      Electronically signed by:  Livia Mclean MD 5/9/2022 11:39 AM

## 2022-05-16 ENCOUNTER — OFFICE VISIT (OUTPATIENT)
Dept: SURGERY | Age: 57
End: 2022-05-16
Payer: COMMERCIAL

## 2022-05-16 VITALS
BODY MASS INDEX: 21.97 KG/M2 | HEART RATE: 92 BPM | DIASTOLIC BLOOD PRESSURE: 79 MMHG | RESPIRATION RATE: 18 BRPM | SYSTOLIC BLOOD PRESSURE: 122 MMHG | OXYGEN SATURATION: 96 % | WEIGHT: 171.2 LBS | TEMPERATURE: 97.8 F | HEIGHT: 74 IN

## 2022-05-16 DIAGNOSIS — K31.1 PYLORIC STENOSIS, ACQUIRED: Primary | ICD-10-CM

## 2022-05-16 PROCEDURE — 99212 OFFICE O/P EST SF 10 MIN: CPT | Performed by: SURGERY

## 2022-05-16 NOTE — PROGRESS NOTES
1. Have you been to the ER, urgent care clinic since your last visit? Hospitalized since your last visit? Discharge from [facility] for Acid reflux. 2. Have you seen or consulted any other health care providers outside of the [address] [place] [address] since your last visit? Include any pap smears or colon screening.  no

## 2022-05-16 NOTE — LETTER
5/16/2022    Patient: Pastor Diaz   YOB: 1965   Date of Visit: 5/16/2022     Gopal Johnson MD  [facility] [facility] [address] [address]  Via Fax: [phone]     Adan Urbina MD  [id number]  Via Fax: [phone]    Dear MD Adan Alas MD,      Thank you for referring . Robert Feliz to [address] for evaluation. My notes for this consultation are attached. If you have questions, please do not hesitate to call me. I look forward to following your patient along with you.       Sincerely,    Lola Dawson MD

## 2022-05-16 NOTE — PROGRESS NOTES
Subjective: Hamida Lai  is a 62 y.o. male who presents for evaluation of jejunostomy tube placement. Pt referred by Dr. Noe Roland. Pt has Hx of esophogeal cancer in 2015 and is in remission. Pt presented to ED on 5/4/2022 complaining of severe case of worsening acid reflux x1 week. Per ED note, last scans clear for recurrence of esophageal cancer. XR CHEST PA LAT 5/04/2022 showed no acute abnormality. XR UPPER GI/SMALL BOWEL on 5/5/2022 showed moderate to severe gastroesophageal reflux with mild presbyesophagus. Patient is status post distal esophagectomy with gastric pull-up. No mass lesion or strictures are identified. Pt reports vomiting liquids and continued weight loss since recent hospitalization. He notes he is able to keep some liquids down but not many. He was seen by GI at [facility] who changed his Reglan from tablet to liquid with some relief of reflux symptoms. He denies any other complaints today. Past Medical History:   Diagnosis Date    Esophageal cancer [facility])     2008    Prostate cancer [facility])     2014    Pyloric stenosis, acquired 5/16/2022    Radiation exposure        Past Surgical History:   Procedure Laterality Date    HX ESOPHAGECTOMY N/A 2008    removal of tumor from esophagus and stomach    HX PROSTATECTOMY N/A     2014       Social History     Tobacco Use    Smoking status: Never Smoker    Smokeless tobacco: Never Used   Substance Use Topics    Alcohol use: Yes     Alcohol/week: 1.0 - 2.0 standard drink     Types: 1 - 2 Glasses of wine per week       Family History   Problem Relation Age of Onset    Cancer Mother     Cancer Father        Current Outpatient Medications on File Prior to Visit   Medication Sig Dispense Refill    metoclopramide HCl (Reglan) 10 mg tablet Take 1 Tablet by mouth Before breakfast, lunch, dinner and at bedtime for 10 days. 40 Tablet 0    dexlansoprazole (Dexilant) 60 mg CpDB capsule (delayed release) Take 60 mg by mouth daily.       fluticasone propionate (Flonase Allergy Relief) 50 mcg/actuation nasal spray 2 Sprays by Both Nostrils route daily as needed. (Patient not taking: Reported on 5/16/2022)      multivitamin (ONE A DAY) tablet Take 1 Tablet by mouth daily. (Patient not taking: Reported on 5/16/2022)      famotidine (Pepcid) 20 mg tablet Take 20 mg by mouth three (3) times daily as needed for Heartburn. Usually takes with lunch, dinner and evening time (Patient not taking: Reported on 5/16/2022)       No current facility-administered medications on file prior to visit. No Known Allergies    Review of Systems:  Constitutional: No fever or chills  Neurologic: No headache  Eyes: No scleral icterus or irritated eyes  Nose: No nasal pain or drainage  Mouth: No oral lesions or sore throat  Cardiac: No palpations or chest pain  Pulmonary: No cough or shortness or breath  Gastrointestinal: No nausea, emesis, diarrhea, or constipation  Genitourinary: No dysuria  Musculoskeletal: No muscle or joint tenderness  Skin: No rashes or lesions  Psychiatric: No anxiety or depressed mood    Objective:     Visit Vitals  /79   Pulse 92   Temp 97.8 °F (36.6 °C) (Oral)   Resp 18   Ht 6' 2\" (1.88 m)   Wt 171 lb 3.2 oz (77.7 kg)   SpO2 96%   BMI 21.98 kg/m²        Labs: No results found for this or any previous visit (from the past 24 hour(s)). Data Review: All previous imaging, testing and lab work was reviewed and interpreted. XR CHEST PA LAT 5/04/2022  IMPRESSION  No acute abnormality. XR UPPER GI/SMALL BOWEL 5/5/2022  IMPRESSION:  Moderate to severe gastroesophageal reflux with mild presbyesophagus. Patient is status post distal esophagectomy with gastric pull-up. No mass lesion or strictures are identified. Assessment and Plan:       ICD-10-CM ICD-9-CM    1. Pyloric stenosis, acquired  K31.1 537.0        Pt has a planned EGD by Dr. Evelia Rebolledo this Thursday.  I will speak with Dr. Evelia Rebolledo about injecting the pylorus with Botox. All questions were answered and patient is in agreement with this plan. Total face to face time with patient: 15 minutes. Greater than 50% of the time was spent in counseling. This note was scribed by Fer Mccray as dictated by Dr. Antonia Butcher.      Signed By: Emily Wallace MD     05/16/22

## 2022-05-18 ENCOUNTER — TELEPHONE (OUTPATIENT)
Dept: SURGERY | Age: 57
End: 2022-05-18

## 2022-05-18 NOTE — TELEPHONE ENCOUNTER
Patient called stating that he would like to get the feeding tube placed before the end of the week. Patient stated he is unable to eat and losing weight rapidly. Please advise.

## 2022-05-19 ENCOUNTER — ANESTHESIA (OUTPATIENT)
Dept: ENDOSCOPY | Age: 57
DRG: 374 | End: 2022-05-19
Payer: COMMERCIAL

## 2022-05-19 ENCOUNTER — ANESTHESIA EVENT (OUTPATIENT)
Dept: ENDOSCOPY | Age: 57
DRG: 374 | End: 2022-05-19
Payer: COMMERCIAL

## 2022-05-19 ENCOUNTER — HOSPITAL ENCOUNTER (INPATIENT)
Age: 57
LOS: 5 days | Discharge: ANOTHER HOSPITAL | DRG: 374 | End: 2022-05-24
Attending: INTERNAL MEDICINE | Admitting: HOSPITALIST
Payer: COMMERCIAL

## 2022-05-19 PROBLEM — K31.1 GASTRIC OUTLET OBSTRUCTION: Status: ACTIVE | Noted: 2022-05-19

## 2022-05-19 LAB
ALBUMIN SERPL-MCNC: 3.1 G/DL (ref 3.5–5)
ALBUMIN/GLOB SERPL: 0.8 {RATIO} (ref 1.1–2.2)
ALP SERPL-CCNC: 70 U/L (ref 45–117)
ALT SERPL-CCNC: 14 U/L (ref 12–78)
ANION GAP SERPL CALC-SCNC: 8 MMOL/L (ref 5–15)
AST SERPL-CCNC: 11 U/L (ref 15–37)
BILIRUB SERPL-MCNC: 1 MG/DL (ref 0.2–1)
BUN SERPL-MCNC: 26 MG/DL (ref 6–20)
BUN/CREAT SERPL: 21 (ref 12–20)
CALCIUM SERPL-MCNC: 8.9 MG/DL (ref 8.5–10.1)
CHLORIDE SERPL-SCNC: 108 MMOL/L (ref 97–108)
CO2 SERPL-SCNC: 23 MMOL/L (ref 21–32)
CREAT SERPL-MCNC: 1.22 MG/DL (ref 0.7–1.3)
ERYTHROCYTE [DISTWIDTH] IN BLOOD BY AUTOMATED COUNT: 13.9 % (ref 11.5–14.5)
GLOBULIN SER CALC-MCNC: 3.7 G/DL (ref 2–4)
GLUCOSE SERPL-MCNC: 96 MG/DL (ref 65–100)
HCT VFR BLD AUTO: 43.9 % (ref 36.6–50.3)
HGB BLD-MCNC: 13.8 G/DL (ref 12.1–17)
MCH RBC QN AUTO: 29.4 PG (ref 26–34)
MCHC RBC AUTO-ENTMCNC: 31.4 G/DL (ref 30–36.5)
MCV RBC AUTO: 93.6 FL (ref 80–99)
NRBC # BLD: 0 K/UL (ref 0–0.01)
NRBC BLD-RTO: 0 PER 100 WBC
PLATELET # BLD AUTO: 311 K/UL (ref 150–400)
PMV BLD AUTO: 9.4 FL (ref 8.9–12.9)
POTASSIUM SERPL-SCNC: 4.3 MMOL/L (ref 3.5–5.1)
PROT SERPL-MCNC: 6.8 G/DL (ref 6.4–8.2)
RBC # BLD AUTO: 4.69 M/UL (ref 4.1–5.7)
SODIUM SERPL-SCNC: 139 MMOL/L (ref 136–145)
WBC # BLD AUTO: 13 K/UL (ref 4.1–11.1)

## 2022-05-19 PROCEDURE — 74011250636 HC RX REV CODE- 250/636: Performed by: HOSPITALIST

## 2022-05-19 PROCEDURE — 77030037345 HC NET FB ENDO RETVR RESCUNT DISP BSC -B: Performed by: INTERNAL MEDICINE

## 2022-05-19 PROCEDURE — 85027 COMPLETE CBC AUTOMATED: CPT

## 2022-05-19 PROCEDURE — 88360 TUMOR IMMUNOHISTOCHEM/MANUAL: CPT

## 2022-05-19 PROCEDURE — C9113 INJ PANTOPRAZOLE SODIUM, VIA: HCPCS | Performed by: HOSPITALIST

## 2022-05-19 PROCEDURE — 88341 IMHCHEM/IMCYTCHM EA ADD ANTB: CPT

## 2022-05-19 PROCEDURE — 36415 COLL VENOUS BLD VENIPUNCTURE: CPT

## 2022-05-19 PROCEDURE — 02HV33Z INSERTION OF INFUSION DEVICE INTO SUPERIOR VENA CAVA, PERCUTANEOUS APPROACH: ICD-10-PCS | Performed by: INTERNAL MEDICINE

## 2022-05-19 PROCEDURE — 77030021593 HC FCPS BIOP ENDOSC BSC -A: Performed by: INTERNAL MEDICINE

## 2022-05-19 PROCEDURE — 88374 M/PHMTRC ALYS ISHQUANT/SEMIQ: CPT

## 2022-05-19 PROCEDURE — 65270000046 HC RM TELEMETRY

## 2022-05-19 PROCEDURE — 2709999900 HC NON-CHARGEABLE SUPPLY: Performed by: INTERNAL MEDICINE

## 2022-05-19 PROCEDURE — 74011250636 HC RX REV CODE- 250/636: Performed by: INTERNAL MEDICINE

## 2022-05-19 PROCEDURE — 76040000007: Performed by: INTERNAL MEDICINE

## 2022-05-19 PROCEDURE — 88342 IMHCHEM/IMCYTCHM 1ST ANTB: CPT

## 2022-05-19 PROCEDURE — 76060000032 HC ANESTHESIA 0.5 TO 1 HR: Performed by: INTERNAL MEDICINE

## 2022-05-19 PROCEDURE — 77030039825 HC MSK NSL PAP SUPERNO2VA VYRM -B: Performed by: ANESTHESIOLOGY

## 2022-05-19 PROCEDURE — 74011000250 HC RX REV CODE- 250: Performed by: HOSPITALIST

## 2022-05-19 PROCEDURE — 74011000250 HC RX REV CODE- 250: Performed by: INTERNAL MEDICINE

## 2022-05-19 PROCEDURE — 80053 COMPREHEN METABOLIC PANEL: CPT

## 2022-05-19 PROCEDURE — 74011250636 HC RX REV CODE- 250/636: Performed by: NURSE ANESTHETIST, CERTIFIED REGISTERED

## 2022-05-19 PROCEDURE — 88305 TISSUE EXAM BY PATHOLOGIST: CPT

## 2022-05-19 RX ORDER — NALOXONE HYDROCHLORIDE 0.4 MG/ML
0.4 INJECTION, SOLUTION INTRAMUSCULAR; INTRAVENOUS; SUBCUTANEOUS
Status: ACTIVE | OUTPATIENT
Start: 2022-05-19 | End: 2022-05-19

## 2022-05-19 RX ORDER — ACETAMINOPHEN 325 MG/1
650 TABLET ORAL
Status: DISCONTINUED | OUTPATIENT
Start: 2022-05-19 | End: 2022-05-24 | Stop reason: HOSPADM

## 2022-05-19 RX ORDER — SODIUM CHLORIDE 0.9 % (FLUSH) 0.9 %
5-40 SYRINGE (ML) INJECTION EVERY 8 HOURS
Status: DISCONTINUED | OUTPATIENT
Start: 2022-05-19 | End: 2022-05-24 | Stop reason: HOSPADM

## 2022-05-19 RX ORDER — FLUMAZENIL 0.1 MG/ML
0.2 INJECTION INTRAVENOUS
Status: ACTIVE | OUTPATIENT
Start: 2022-05-19 | End: 2022-05-19

## 2022-05-19 RX ORDER — DOCUSATE SODIUM 100 MG/1
100 CAPSULE, LIQUID FILLED ORAL
Status: DISCONTINUED | OUTPATIENT
Start: 2022-05-19 | End: 2022-05-24 | Stop reason: HOSPADM

## 2022-05-19 RX ORDER — SODIUM CHLORIDE 9 MG/ML
50 INJECTION, SOLUTION INTRAVENOUS CONTINUOUS
Status: DISPENSED | OUTPATIENT
Start: 2022-05-19 | End: 2022-05-19

## 2022-05-19 RX ORDER — DEXTROMETHORPHAN/PSEUDOEPHED 2.5-7.5/.8
1.2 DROPS ORAL
Status: DISCONTINUED | OUTPATIENT
Start: 2022-05-19 | End: 2022-05-19 | Stop reason: HOSPADM

## 2022-05-19 RX ORDER — HYDROCODONE BITARTRATE AND ACETAMINOPHEN 5; 325 MG/1; MG/1
1 TABLET ORAL
Status: DISCONTINUED | OUTPATIENT
Start: 2022-05-19 | End: 2022-05-24 | Stop reason: HOSPADM

## 2022-05-19 RX ORDER — ONDANSETRON 2 MG/ML
4 INJECTION INTRAMUSCULAR; INTRAVENOUS
Status: DISCONTINUED | OUTPATIENT
Start: 2022-05-19 | End: 2022-05-24 | Stop reason: HOSPADM

## 2022-05-19 RX ORDER — SODIUM CHLORIDE 9 MG/ML
INJECTION, SOLUTION INTRAVENOUS
Status: DISCONTINUED | OUTPATIENT
Start: 2022-05-19 | End: 2022-05-19 | Stop reason: HOSPADM

## 2022-05-19 RX ORDER — ATROPINE SULFATE 0.1 MG/ML
0.5 INJECTION INTRAVENOUS
Status: DISCONTINUED | OUTPATIENT
Start: 2022-05-19 | End: 2022-05-19 | Stop reason: HOSPADM

## 2022-05-19 RX ORDER — METOCLOPRAMIDE HYDROCHLORIDE 5 MG/ML
10 INJECTION INTRAMUSCULAR; INTRAVENOUS EVERY 8 HOURS
Status: DISCONTINUED | OUTPATIENT
Start: 2022-05-19 | End: 2022-05-24 | Stop reason: HOSPADM

## 2022-05-19 RX ORDER — METOCLOPRAMIDE 10 MG/1
40 TABLET ORAL
COMMUNITY
End: 2022-05-24

## 2022-05-19 RX ORDER — POLYETHYLENE GLYCOL 3350 17 G/17G
17 POWDER, FOR SOLUTION ORAL
Status: DISCONTINUED | OUTPATIENT
Start: 2022-05-19 | End: 2022-05-24 | Stop reason: HOSPADM

## 2022-05-19 RX ORDER — PROPOFOL 10 MG/ML
INJECTION, EMULSION INTRAVENOUS AS NEEDED
Status: DISCONTINUED | OUTPATIENT
Start: 2022-05-19 | End: 2022-05-19 | Stop reason: HOSPADM

## 2022-05-19 RX ORDER — SODIUM CHLORIDE 0.9 % (FLUSH) 0.9 %
5-40 SYRINGE (ML) INJECTION AS NEEDED
Status: DISCONTINUED | OUTPATIENT
Start: 2022-05-19 | End: 2022-05-24 | Stop reason: HOSPADM

## 2022-05-19 RX ORDER — MIDAZOLAM HYDROCHLORIDE 1 MG/ML
.25-5 INJECTION, SOLUTION INTRAMUSCULAR; INTRAVENOUS
Status: ACTIVE | OUTPATIENT
Start: 2022-05-19 | End: 2022-05-19

## 2022-05-19 RX ORDER — METOCLOPRAMIDE 10 MG/1
40 TABLET ORAL
Status: CANCELLED | OUTPATIENT
Start: 2022-05-19

## 2022-05-19 RX ORDER — FENTANYL CITRATE 50 UG/ML
25-200 INJECTION, SOLUTION INTRAMUSCULAR; INTRAVENOUS
Status: ACTIVE | OUTPATIENT
Start: 2022-05-19 | End: 2022-05-19

## 2022-05-19 RX ORDER — SODIUM CHLORIDE 9 MG/ML
50 INJECTION, SOLUTION INTRAVENOUS CONTINUOUS
Status: DISCONTINUED | OUTPATIENT
Start: 2022-05-19 | End: 2022-05-24 | Stop reason: HOSPADM

## 2022-05-19 RX ORDER — EPINEPHRINE 0.1 MG/ML
1 INJECTION INTRACARDIAC; INTRAVENOUS
Status: DISCONTINUED | OUTPATIENT
Start: 2022-05-19 | End: 2022-05-19 | Stop reason: HOSPADM

## 2022-05-19 RX ADMIN — SODIUM CHLORIDE 100 ML/HR: 900 INJECTION, SOLUTION INTRAVENOUS at 18:09

## 2022-05-19 RX ADMIN — SODIUM CHLORIDE: 900 INJECTION, SOLUTION INTRAVENOUS at 16:07

## 2022-05-19 RX ADMIN — PROPOFOL 20 MG: 10 INJECTION, EMULSION INTRAVENOUS at 16:34

## 2022-05-19 RX ADMIN — METOCLOPRAMIDE HYDROCHLORIDE 10 MG: 5 INJECTION INTRAMUSCULAR; INTRAVENOUS at 22:11

## 2022-05-19 RX ADMIN — PROPOFOL 20 MG: 10 INJECTION, EMULSION INTRAVENOUS at 16:44

## 2022-05-19 RX ADMIN — SODIUM CHLORIDE, PRESERVATIVE FREE 10 ML: 5 INJECTION INTRAVENOUS at 22:12

## 2022-05-19 RX ADMIN — SODIUM CHLORIDE, PRESERVATIVE FREE 10 ML: 5 INJECTION INTRAVENOUS at 22:00

## 2022-05-19 RX ADMIN — PROPOFOL 20 MG: 10 INJECTION, EMULSION INTRAVENOUS at 16:40

## 2022-05-19 RX ADMIN — PROPOFOL 20 MG: 10 INJECTION, EMULSION INTRAVENOUS at 16:46

## 2022-05-19 RX ADMIN — SODIUM CHLORIDE, PRESERVATIVE FREE 40 MG: 5 INJECTION INTRAVENOUS at 22:11

## 2022-05-19 RX ADMIN — PROPOFOL 20 MG: 10 INJECTION, EMULSION INTRAVENOUS at 16:38

## 2022-05-19 RX ADMIN — PROPOFOL 200 MG: 10 INJECTION, EMULSION INTRAVENOUS at 16:30

## 2022-05-19 RX ADMIN — PROPOFOL 20 MG: 10 INJECTION, EMULSION INTRAVENOUS at 16:42

## 2022-05-19 RX ADMIN — ONABOTULINUMTOXINA 100 UNITS: 100 INJECTION, POWDER, LYOPHILIZED, FOR SOLUTION INTRADERMAL; INTRAMUSCULAR at 16:39

## 2022-05-19 RX ADMIN — PROPOFOL 20 MG: 10 INJECTION, EMULSION INTRAVENOUS at 16:36

## 2022-05-19 NOTE — PROCEDURES
[address] [facility], [address] [facility] (EGD) Procedure Note    [facility]  1965  [id number]      Procedure: Endoscopic Gastroduodenoscopy with biopsy, and submucosal injection of botox    Indication: hematemesis ,  severe nausea and vomiting, unable to eat for last 5 days , s/p negative abdominal imaging by [facility], EGD done also for botox injection of pylorus, case discussed with his surgeon, Dr Pau Davis     Pre-operative Diagnosis: see indication above    Post-operative Diagnosis: see findings below    : Pamela Haynes MD    Surgical Assistant: Endoscopy RN-1: Thelma Conn, NATALIA  Endoscopy RN-2: Cheryl Luis RN    Implants:  None    Referring Provider:  Ila Holden MD      Anesthesia/Sedation:  MAC anesthesia Propofol        Procedure Details     After infomed consent was obtained for the procedure, with all risks and benefits of procedure explained the patient was taken to the endoscopy suite and placed in the left lateral decubitus position. Following sequential administration of sedation as per above, the endoscope was inserted into the mouth and advanced under direct vision to third portion of the duodenum. A careful inspection was made as the gastroscope was withdrawn, including a retroflexed view of the proximal stomach; findings and interventions are described below.       Findings:   Esophagus:normal esophago- gastric anastomosis was patent and normal    Stomach: large amount of melenic fluid, all suctioned out , 500 ml removed    There was ulcerated mass in prepyloric area, measured 3 x 4 cm, very friable, multiple biopsies taken to r/o malignancy verus inflammatory mass  I was able to advance my scope through pylorus with mild resistance  I then performed submucosal injection of Botox in pylorus channel, 1 ml ( 20 units) per quadrant, total of  4 quadrants, total 4 ml( 100 units of botox)     Duodenum/jejunum: normal      Therapies:  As above    Specimens: as above         EBL: None      Complications:   None; patient tolerated the procedure well.            Impression:    -suspect gastric outlet obstruction from the prepyloric mass and pyloric stenosis    Recommendations:  -NPO  -admit to medical hospitalist service, discussed with Dr Vernell Ray  -IV hydration, consider TPN in am   -consult his surgeon, Dr Toro Valdivia  -consult medical oncology, Dr Jameel Enriquez   -discussed results with patient and his wife, answered all their questions    -our GI team will follow in am     Signed By: Raya Colón MD     5/19/2022  4:57 PM

## 2022-05-19 NOTE — PROGRESS NOTES
TRANSFER - OUT REPORT:    Verbal report given to nurse Kell (name) on Eliane Roman  being transferred to [facility](unit) for routine progression of care       Report consisted of patients Situation, Background, Assessment and   Recommendations(SBAR). Information from the following report(s) SBAR, Procedure Summary, Intake/Output, MAR and Recent Results was reviewed with the receiving nurse. Lines:   Peripheral IV 05/19/22 Posterior;Right Hand (Active)   Site Assessment Clean, dry, & intact 05/19/22 1517   Phlebitis Assessment 0 05/19/22 1517   Dressing Status Clean, dry, & intact 05/19/22 1517   Dressing Type Transparent;Tape 05/19/22 1517        Opportunity for questions and clarification was provided.       Patient transported with:   [employer]

## 2022-05-19 NOTE — H&P
[address]  [address] [facility], [address] [place] [address]      History and Physical       NAME:  Edwin Luna   :   1965   MRN:   [id number]             History of Present Illness:  Patient is a 62 y.o. who is seen for hematemesis . PMH:  Past Medical History:   Diagnosis Date    Esophageal cancer ([address]         Prostate cancer [facility])         Pyloric stenosis, acquired 2022    Radiation exposure        PSH:  Past Surgical History:   Procedure Laterality Date    HX ESOPHAGECTOMY N/A     removal of tumor from esophagus and stomach    HX PROSTATECTOMY N/A            Allergies:  No Known Allergies    Home Medications:  Prior to Admission Medications   Prescriptions Last Dose Informant Patient Reported? Taking?   dexlansoprazole (Dexilant) 60 mg CpDB capsule (delayed release) 2022 at Unknown time  Yes Yes   Sig: Take 60 mg by mouth daily. famotidine (Pepcid) 20 mg tablet 2022 at Unknown time  Yes Yes   Sig: Take 20 mg by mouth three (3) times daily as needed for Heartburn. Usually takes with lunch, dinner and evening time    fluticasone propionate (Flonase Allergy Relief) 50 mcg/actuation nasal spray 2022 at Unknown time  Yes Yes   Si Sprays by Both Nostrils route daily as needed. metoclopramide HCl (Reglan) 10 mg tablet 2022 at Unknown time  Yes Yes   Sig: Take 40 mg by mouth Before breakfast, lunch, dinner and at bedtime. multivitamin (ONE A DAY) tablet 2022 at Unknown time  Yes Yes   Sig: Take 1 Tablet by mouth daily.       Facility-Administered Medications: None       Hospital Medications:  Current Facility-Administered Medications   Medication Dose Route Frequency    0.9% sodium chloride infusion  50 mL/hr IntraVENous CONTINUOUS    sodium chloride (NS) flush 5-40 mL  5-40 mL IntraVENous Q8H    sodium chloride (NS) flush 5-40 mL  5-40 mL IntraVENous PRN    midazolam (VERSED) injection 0.25-5 mg  0.25-5 mg IntraVENous Multiple  fentaNYL citrate (PF) injection  mcg   mcg IntraVENous Multiple    naloxone (NARCAN) injection 0.4 mg  0.4 mg IntraVENous Multiple    flumazeniL (ROMAZICON) 0.1 mg/mL injection 0.2 mg  0.2 mg IntraVENous Multiple    simethicone (MYLICON) 34UF/0.7NF oral drops 80 mg  1.2 mL Oral Multiple    atropine injection 0.5 mg  0.5 mg IntraVENous ONCE PRN    EPINEPHrine (ADRENALIN) 0.1 mg/mL syringe 1 mg  1 mg Endoscopically ONCE PRN    onabotulinumtoxinA (BOTOX) injection 100 Units  100 Units IntraMUSCular ONCE       Social History:  Social History     Tobacco Use    Smoking status: Never Smoker    Smokeless tobacco: Never Used   Substance Use Topics    Alcohol use: Yes     Alcohol/week: 1.0 - 2.0 standard drink     Types: 1 - 2 Glasses of wine per week       Family History:  Family History   Problem Relation Age of Onset    Cancer Mother     Cancer Father          The patient was counseled at length about the risks of toan Covid-19 in the conrad-operative and post-operative states including the recovery window of their procedure. The patient was made aware that toan Covid-19 after a surgical procedure may worsen their prognosis for recovering from the virus and lend to a higher morbidity and or mortality risk. The patient was given the options of postponing their procedure. All of the risks, benefits, and alternatives were discussed. The patient does  wish to proceed with the procedure.     Review of Systems:      Constitutional: negative fever, negative chills, negative weight loss  Eyes:   negative visual changes  ENT:   negative sore throat, tongue or lip swelling  Respiratory:  negative cough, negative dyspnea  Cards:  negative for chest pain, palpitations, lower extremity edema  GI:   See HPI  :  negative for frequency, dysuria  Integument:  negative for rash and pruritus  Heme:  negative for easy bruising and gum/nose bleeding  Musculoskel: negative for myalgias,  back pain and muscle weakness  Neuro: negative for headaches, dizziness, vertigo  Psych:  negative for feelings of anxiety, depression       Objective:     Patient Vitals for the past 8 hrs:   BP Temp Pulse Resp SpO2 Height Weight   05/19/22 1517 121/79 98.4 °F (36.9 °C) (!) 106 20 96 % -- --   05/19/22 1504 -- -- -- -- -- 6' 2\" (1.88 m) 77.1 kg (170 lb)     No intake/output data recorded. No intake/output data recorded. EXAM:     NEURO-a&o   HEENT-wnl   LUNGS-clear    COR-regular rate and rhythym     ABD-soft , no tenderness, no rebound, good bs     EXT-no edema     Data Review     No results for input(s): WBC, HGB, HCT, PLT, HGBEXT, HCTEXT, PLTEXT in the last 72 hours. No results for input(s): NA, K, CL, CO2, BUN, CREA, GLU, PHOS, CA in the last 72 hours. No results for input(s): AP, TBIL, TP, ALB, GLOB, GGT, AML, LPSE in the last 72 hours. No lab exists for component: SGOT, GPT, AMYP, HLPSE  No results for input(s): INR, PTP, APTT, INREXT in the last 72 hours.        Assessment:     · hematemesis     Patient Active Problem List   Diagnosis Code    Respiratory failure ([facility] J96.90    [facility] E43    Pyloric stenosis, acquired K31.1           Plan:   ·   · Endoscopic procedure with sedation     Signed By: Jr Sotomayor MD     5/19/2022  4:19 PM

## 2022-05-19 NOTE — ANESTHESIA POSTPROCEDURE EVALUATION
Post-Anesthesia Evaluation and Assessment    Patient: Abhi Du MRN: [id number]  SSN: [id number]    YOB: 1965  Age: 62 y.o. Sex: male      I have evaluated the patient and they are stable and ready for discharge from the PACU. Cardiovascular Function/Vital Signs  Visit Vitals  /75 (BP 1 Location: Left upper arm, BP Patient Position: At rest)   Pulse 100   Temp 36.6 °C (97.9 °F)   Resp 20   Ht 6' 2\" (1.88 m)   Wt 77.1 kg (170 lb)   SpO2 94%   BMI 21.83 kg/m²       Patient is status post MAC anesthesia for Procedure(s):  ESOPHAGOGASTRODUODENOSCOPY (EGD) with Botox injection  INJECTION  ESOPHAGOGASTRODUODENAL (EGD) BIOPSY. Nausea/Vomiting: None    Postoperative hydration reviewed and adequate. Pain:  Pain Scale 1: Numeric (0 - 10) (05/19/22 1800)  Pain Intensity 1: 0 (05/19/22 1800)   Managed    Neurological Status: At baseline    Mental Status, Level of Consciousness: Alert and  oriented to person, place, and time    Pulmonary Status:   O2 Device: None (Room air) (05/19/22 1800)   Adequate oxygenation and airway patent    Complications related to anesthesia: None    Post-anesthesia assessment completed. No concerns    Signed By: Yue Watkins MD     May 19, 2022              Procedure(s):  ESOPHAGOGASTRODUODENOSCOPY (EGD) with Botox injection  INJECTION  ESOPHAGOGASTRODUODENAL (EGD) BIOPSY.     MAC    <BSHSIANPOST>    INITIAL Post-op Vital signs:   Vitals Value Taken Time   /75 05/19/22 1836   Temp 36.6 °C (97.9 °F) 05/19/22 1836   Pulse 100 05/19/22 1836   Resp 20 05/19/22 1836   SpO2 94 % 05/19/22 1836

## 2022-05-19 NOTE — ROUTINE PROCESS
Amos [facility]  1965  [id number]    Situation:  Verbal report received from: ELYSSA Farooq RN  Procedure: Procedure(s):  ESOPHAGOGASTRODUODENOSCOPY (EGD) with Botox injection  INJECTION  ESOPHAGOGASTRODUODENAL (EGD) BIOPSY    Background:    Preoperative diagnosis: Hematemesis, unspecified whether nausea present [K92.0]  Postoperative diagnosis: 1)Gastric Mass    :  Dr. Farzana Baker  Assistant(s): Endoscopy RN-1: Ebb Sample, RN  Endoscopy RN-2: Cheryl Luis RN    Specimens:   ID Type Source Tests Collected by Time Destination   1 : Gastric Mass Bx Preservative Gastric  Dai Alba MD 5/19/2022 1641 Pathology     H. Pylori  no    Assessment:  Intra-procedure medications   Anesthesia gave intra-procedure sedation and medications, see anesthesia flow sheet yes    Intravenous fluids: NS@ KVO     Vital signs stable     Abdominal assessment: round and soft     Recommendation: Pt will be admit to hospital.

## 2022-05-19 NOTE — PERIOP NOTES
Dr. Alma Rosa Franz at bedside to discuss procedure and answer questions with patient and patient's spouse.

## 2022-05-20 LAB
ALBUMIN SERPL-MCNC: 2.3 G/DL (ref 3.5–5)
ALBUMIN/GLOB SERPL: 0.7 {RATIO} (ref 1.1–2.2)
ALP SERPL-CCNC: 55 U/L (ref 45–117)
ALT SERPL-CCNC: 9 U/L (ref 12–78)
ANION GAP SERPL CALC-SCNC: 8 MMOL/L (ref 5–15)
AST SERPL-CCNC: 5 U/L (ref 15–37)
BASOPHILS # BLD: 0 K/UL (ref 0–0.1)
BASOPHILS NFR BLD: 0 % (ref 0–1)
BILIRUB SERPL-MCNC: 1.2 MG/DL (ref 0.2–1)
BUN SERPL-MCNC: 20 MG/DL (ref 6–20)
BUN/CREAT SERPL: 19 (ref 12–20)
CALCIUM SERPL-MCNC: 7.4 MG/DL (ref 8.5–10.1)
CHLORIDE SERPL-SCNC: 113 MMOL/L (ref 97–108)
CO2 SERPL-SCNC: 19 MMOL/L (ref 21–32)
CREAT SERPL-MCNC: 1.07 MG/DL (ref 0.7–1.3)
DIFFERENTIAL METHOD BLD: ABNORMAL
EOSINOPHIL # BLD: 0 K/UL (ref 0–0.4)
EOSINOPHIL NFR BLD: 0 % (ref 0–7)
ERYTHROCYTE [DISTWIDTH] IN BLOOD BY AUTOMATED COUNT: 13.9 % (ref 11.5–14.5)
GLOBULIN SER CALC-MCNC: 3.1 G/DL (ref 2–4)
GLUCOSE SERPL-MCNC: 84 MG/DL (ref 65–100)
HCT VFR BLD AUTO: 33.8 % (ref 36.6–50.3)
HGB BLD-MCNC: 11.3 G/DL (ref 12.1–17)
IMM GRANULOCYTES # BLD AUTO: 0 K/UL (ref 0–0.04)
IMM GRANULOCYTES NFR BLD AUTO: 0 % (ref 0–0.5)
LYMPHOCYTES # BLD: 1.1 K/UL (ref 0.8–3.5)
LYMPHOCYTES NFR BLD: 9 % (ref 12–49)
MCH RBC QN AUTO: 30.3 PG (ref 26–34)
MCHC RBC AUTO-ENTMCNC: 33.4 G/DL (ref 30–36.5)
MCV RBC AUTO: 90.6 FL (ref 80–99)
MONOCYTES # BLD: 0.7 K/UL (ref 0–1)
MONOCYTES NFR BLD: 6 % (ref 5–13)
NEUTS SEG # BLD: 10.2 K/UL (ref 1.8–8)
NEUTS SEG NFR BLD: 85 % (ref 32–75)
NRBC # BLD: 0 K/UL (ref 0–0.01)
NRBC BLD-RTO: 0 PER 100 WBC
PHOSPHATE SERPL-MCNC: 2.9 MG/DL (ref 2.6–4.7)
PLATELET # BLD AUTO: 258 K/UL (ref 150–400)
PMV BLD AUTO: 9.4 FL (ref 8.9–12.9)
POTASSIUM SERPL-SCNC: 3.7 MMOL/L (ref 3.5–5.1)
PROT SERPL-MCNC: 5.4 G/DL (ref 6.4–8.2)
RBC # BLD AUTO: 3.73 M/UL (ref 4.1–5.7)
SODIUM SERPL-SCNC: 140 MMOL/L (ref 136–145)
WBC # BLD AUTO: 12.1 K/UL (ref 4.1–11.1)

## 2022-05-20 PROCEDURE — 0DB78ZX EXCISION OF STOMACH, PYLORUS, VIA NATURAL OR ARTIFICIAL OPENING ENDOSCOPIC, DIAGNOSTIC: ICD-10-PCS | Performed by: HOSPITALIST

## 2022-05-20 PROCEDURE — 74011250636 HC RX REV CODE- 250/636: Performed by: HOSPITALIST

## 2022-05-20 PROCEDURE — 76937 US GUIDE VASCULAR ACCESS: CPT

## 2022-05-20 PROCEDURE — 65270000046 HC RM TELEMETRY

## 2022-05-20 PROCEDURE — 80053 COMPREHEN METABOLIC PANEL: CPT

## 2022-05-20 PROCEDURE — 74011000250 HC RX REV CODE- 250: Performed by: INTERNAL MEDICINE

## 2022-05-20 PROCEDURE — 84100 ASSAY OF PHOSPHORUS: CPT

## 2022-05-20 PROCEDURE — 77030020365 HC SOL INJ SOD CL 0.9% 50ML

## 2022-05-20 PROCEDURE — C9113 INJ PANTOPRAZOLE SODIUM, VIA: HCPCS | Performed by: HOSPITALIST

## 2022-05-20 PROCEDURE — 74011000258 HC RX REV CODE- 258: Performed by: HOSPITALIST

## 2022-05-20 PROCEDURE — 85025 COMPLETE CBC W/AUTO DIFF WBC: CPT

## 2022-05-20 PROCEDURE — 36415 COLL VENOUS BLD VENIPUNCTURE: CPT

## 2022-05-20 PROCEDURE — 99222 1ST HOSP IP/OBS MODERATE 55: CPT | Performed by: NURSE PRACTITIONER

## 2022-05-20 PROCEDURE — 36573 INSJ PICC RS&I 5 YR+: CPT | Performed by: HOSPITALIST

## 2022-05-20 PROCEDURE — 74011000250 HC RX REV CODE- 250: Performed by: HOSPITALIST

## 2022-05-20 PROCEDURE — C1751 CATH, INF, PER/CENT/MIDLINE: HCPCS

## 2022-05-20 RX ADMIN — SODIUM CHLORIDE, PRESERVATIVE FREE 10 ML: 5 INJECTION INTRAVENOUS at 21:58

## 2022-05-20 RX ADMIN — SODIUM CHLORIDE, PRESERVATIVE FREE 40 MG: 5 INJECTION INTRAVENOUS at 08:44

## 2022-05-20 RX ADMIN — SODIUM CHLORIDE, PRESERVATIVE FREE 10 ML: 5 INJECTION INTRAVENOUS at 06:00

## 2022-05-20 RX ADMIN — SODIUM CHLORIDE, PRESERVATIVE FREE 40 MG: 5 INJECTION INTRAVENOUS at 21:57

## 2022-05-20 RX ADMIN — SODIUM CHLORIDE, PRESERVATIVE FREE 10 ML: 5 INJECTION INTRAVENOUS at 16:59

## 2022-05-20 RX ADMIN — SODIUM CHLORIDE, PRESERVATIVE FREE 10 ML: 5 INJECTION INTRAVENOUS at 22:00

## 2022-05-20 RX ADMIN — SODIUM CHLORIDE 50 ML/HR: 900 INJECTION, SOLUTION INTRAVENOUS at 21:59

## 2022-05-20 RX ADMIN — METOCLOPRAMIDE HYDROCHLORIDE 10 MG: 5 INJECTION INTRAMUSCULAR; INTRAVENOUS at 21:57

## 2022-05-20 RX ADMIN — METOCLOPRAMIDE HYDROCHLORIDE 10 MG: 5 INJECTION INTRAMUSCULAR; INTRAVENOUS at 06:05

## 2022-05-20 RX ADMIN — SODIUM CHLORIDE, PRESERVATIVE FREE 10 ML: 5 INJECTION INTRAVENOUS at 06:05

## 2022-05-20 RX ADMIN — CALCIUM GLUCONATE: 94 INJECTION, SOLUTION INTRAVENOUS at 16:52

## 2022-05-20 RX ADMIN — METOCLOPRAMIDE HYDROCHLORIDE 10 MG: 5 INJECTION INTRAMUSCULAR; INTRAVENOUS at 15:04

## 2022-05-20 NOTE — PROGRESS NOTES
Bedside shift change report given to NATALIA Castorena (oncoming nurse) by Aaron Eric (offgoing nurse). Report included the following information SBAR, Kardex, Intake/Output, MAR, Accordion, Recent Results and Cardiac Rhythm NSR.

## 2022-05-20 NOTE — CONSULTS
Nutrition Note    TPN Recommendations:     Day 1: 5%AA, 15% dex @ 42 mL/hr  Day 2: 6%AA, 15% dex @ 63 mL/hr  Day 3: 6%AA, 17% dex @ 75 mL/hr + 250 mL 20% lipids daily (GOAL)    Please add thiamine to TPN daily    Monitor BMP, Mg, Phos daily for at least first 72 hours with PRN repletion before advancing TPN         Consult received for TPN recommendations. Pt known to this service from admission a few weeks ago. Pt has lost another 10 lb in past 2 weeks (now total 35 lb since Jan 2022 - 17% in <6 months, significant). Unable to keep any food or liquid down for past 5 days. Agree with TPN. Would consider refeeding risk. Added on Phos to today's labs for baseline. Full note to follow, but TPN recommendations as above, goal to provide 2050 mL, 1972 kcal, 108 gm pro, and 306 gm dex (GIR 2.8 mg/kg/min). Estimated Daily Nutrient Needs:  Energy (kcal):  3973-6038 (25-30 kcal/kg)  Protein (g):   (1.2-1.5 gm/kg)       Fluid (ml/day):  1 mL/kcal    Recent Labs     05/20/22  0457 05/19/22  1906   GLU 84 96   BUN 20 26*   CREA 1.07 1.22    139   K 3.7 4.3   * 108   CO2 19* 23   CA 7.4* 8.9       No results for input(s): GLUCPOC in the last 72 hours.     No results found for: HBA1C, HQC4TFKB, OEP6LFYF, AOE6HTSP        Elzbieta Barrios RD  Available via [employer]

## 2022-05-20 NOTE — CONSULTS
[facility] at [facility] Date: 5/19/2022  Reason for Consultation: ulcerated mass of prepyloric area - mass vs inflammation    HPI:  Abhi Du is a 62 y.o. male w/ hx of esophageal cancer s/p distal esophagectomy w/ gastric pull-up in 2008 by Dr David Gutierrez whom we are asked to see in consultation by Dr. Maribel Mosley for the above complaint. Pt has a 6 week hx of reflux, n/v w/ hematemesis (worse lately), and 30lb weight loss. He had an egd yesterday - see below. He was in the hospital earlier in the month. He had had a CT at [facility] which was normal.  He had an UGI which showed mod-severe GE reflux. He denies abd pain; rarely has BMs. Hasn't kept food or liquid down in 5 days. Wife at bedside helping w/ hx.         There was ulcerated mass in prepyloric area, measured 3 x 4 cm, very friable, multiple biopsies taken to r/o malignancy verus inflammatory mass  I was able to advance my scope through pylorus with mild resistance  I then performed submucosal injection of Botox in pylorus channel, 1 ml ( 20 units) per quadrant, total of  4 quadrants, total 4 ml( 100 units of botox)      Duodenum/jejunum: normal  Patient Active Problem List    Diagnosis Date Noted    Gastric outlet obstruction 05/19/2022    Pyloric stenosis, acquired 05/16/2022    Severe protein-calorie malnutrition ([address] 05/05/2022    Respiratory failure ([address] 05/04/2022     Past Medical History:   Diagnosis Date    Esophageal cancer ([address]     2008    Prostate cancer ([address]     2014    Pyloric stenosis, acquired 5/16/2022    Radiation exposure       Past Surgical History:   Procedure Laterality Date    HX ESOPHAGECTOMY N/A 2008    removal of tumor from esophagus and stomach    HX PROSTATECTOMY N/A     2014      Social History     Tobacco Use    Smoking status: Never Smoker    Smokeless tobacco: Never Used   Substance Use Topics    Alcohol use:  Yes     Alcohol/week: 1.0 - 2.0 standard drink     Types: 1 - 2 Glasses of wine per week      Family History   Problem Relation Age of Onset    Cancer Mother     Cancer Father       Prior to Admission medications    Medication Sig Start Date End Date Taking? Authorizing Provider   metoclopramide HCl (Reglan) 10 mg tablet Take 40 mg by mouth Before breakfast, lunch, dinner and at bedtime. Yes Provider, Historical   dexlansoprazole (Dexilant) 60 mg CpDB capsule (delayed release) Take 60 mg by mouth daily. Yes Provider, Historical   fluticasone propionate (Flonase Allergy Relief) 50 mcg/actuation nasal spray 2 Sprays by Both Nostrils route daily as needed. Yes Provider, Historical   multivitamin (ONE A DAY) tablet Take 1 Tablet by mouth daily. Yes Provider, Historical   famotidine (Pepcid) 20 mg tablet Take 20 mg by mouth three (3) times daily as needed for Heartburn.  Usually takes with lunch, dinner and evening time    Yes Provider, Historical     Current Facility-Administered Medications   Medication Dose Route Frequency    sodium chloride (NS) flush 5-40 mL  5-40 mL IntraVENous Q8H    sodium chloride (NS) flush 5-40 mL  5-40 mL IntraVENous PRN    pantoprazole (PROTONIX) 40 mg in 0.9% sodium chloride 10 mL injection  40 mg IntraVENous Q12H    sodium chloride (NS) flush 5-40 mL  5-40 mL IntraVENous Q8H    sodium chloride (NS) flush 5-40 mL  5-40 mL IntraVENous PRN    ondansetron (ZOFRAN) injection 4 mg  4 mg IntraVENous Q4H PRN    0.9% sodium chloride infusion  100 mL/hr IntraVENous CONTINUOUS    acetaminophen (TYLENOL) tablet 650 mg  650 mg Oral Q4H PRN    HYDROcodone-acetaminophen (NORCO) 5-325 mg per tablet 1 Tablet  1 Tablet Oral Q6H PRN    docusate sodium (COLACE) capsule 100 mg  100 mg Oral BID PRN    polyethylene glycol (MIRALAX) packet 17 g  17 g Oral DAILY PRN    metoclopramide HCl (REGLAN) injection 10 mg  10 mg IntraVENous Q8H     No Known Allergies       Subjective:     Review of Systems:    A comprehensive review of systems was negative except for that written in the History of Present Illness. Objective:     Blood pressure 125/69, pulse 95, temperature 98.7 °F (37.1 °C), resp. rate 20, height 6' 2\" (1.88 m), weight 169 lb 1.6 oz (76.7 kg), SpO2 90 %. Temp (24hrs), Av.6 °F (37 °C), Min:97.9 °F (36.6 °C), Max:99 °F (37.2 °C)      Recent Labs     22  0613 22  1906   WBC 12.1* 13.0*   HGB 11.3* 13.8   HCT 33.8* 43.9    311     Recent Labs     22  0457 22  1906    139   K 3.7 4.3   * 108   CO2 19* 23   GLU 84 96   BUN 20 26*   CREA 1.07 1.22   CA 7.4* 8.9   ALB 2.3* 3.1*   TBILI 1.2* 1.0   ALT 9* 14     No results for input(s): AML, LPSE in the last 72 hours. Intake/Output Summary (Last 24 hours) at 2022 0834  Last data filed at 2022 1742  Gross per 24 hour   Intake 1000 ml   Output --   Net 1000 ml       _____________________  Physical Exam:     General:  Alert, cooperative, no distress, appears stated age. Eyes:   sclera clear   Throat: Lips, mucosa, and tongue normal.   Neck: Supple, symmetrical, trachea midline. Lungs:   Clear to auscultation bilaterally. Heart:  Regular rate and rhythm. Abdomen:   Normal BS, flat, Soft, non-tender. No masses,  No organomegaly. Extremities: Extremities normal, atraumatic, no cyanosis or edema. Skin: Skin color, texture, turgor normal. No rashes or lesions. Assessment:   Active Problems:    Gastric outlet obstruction (2022)            Plan:     Pt needs PICC and TPN - have asked hospitalist to manage  IV hydration  PPI  Await pathology from yesterday's egd  Dr Manish Kelly to see  Thank you for allowing us to participate in the care of this patient. Total time spent with patient: 50 minutes. Signed By: Candelario Fong NP     May 20, 2022    ATTENDING ADDENDUM  I supervised the APC and reviewed the note. We discussed the plan of care  Patient is very well known to me.   The appearance of what is acting like gastric outlet obstruction is not a common occurrence this late after esophagogastrectomy. Dr. Thomas Avila finding of ulcerations and possible mass in the antrum is very worrisome for a new primary. It could still be just pyloric dysfunction with resulting gastric irritation from the prolonged stasis. Agree completely with TPN and await the biopsy results.

## 2022-05-20 NOTE — PROGRESS NOTES
Comprehensive Nutrition Assessment    Type and Reason for Visit: Initial,Consult    TPN Recommendations:      Day 1: 5%AA, 15% dex @ 42 mL/hr  Day 2: 6%AA, 15% dex @ 63 mL/hr  Day 3: 6%AA, 17% dex @ 75 mL/hr + 250 mL 20% lipids daily (GOAL)     Please add thiamine to TPN daily     Monitor BMP, Mg, Phos daily for at least first 72 hours with PRN repletion before advancing TPN       Malnutrition Assessment:  Malnutrition Status:  Severe malnutrition (05/20/22 1203)    Context:  Chronic illness     Findings of the 6 clinical characteristics of malnutrition:   Energy Intake:  75% or less est energy requirements for 1 month or longer  Weight Loss:  Greater than 10% over 6 months     Body Fat Loss:  Unable to assess,     Muscle Mass Loss:  Unable to assess,    Fluid Accumulation:  No significant fluid accumulation,     Strength:  Not performed        Nutrition Assessment:    Past Medical History:   Diagnosis Date    Es[facility] ([facility]     2008    Prostate cancer ([facility]     2014    Pyloric stenosis, acquired 5/16/2022    Radiation exposure        Pt admitted with Hematemesis, unspecified whether nausea present [K92.0]  Gastric outlet obstruction [K31.1]  PMHx includes esophageal cancer s/p XRT and esophagectomy in 8/2008 with Dr. Lela Cummings, in remission. EGD (2/15/22) by Dr. Josesito Miller showed a normal esophago-gastric anastomosis; otherwise, normal.  Admitted 5/4-5/6 with worsening reflux/ chest discomfort. He had had a CT at [facility] which was normal. UGI during that admission previously which showed mod-severe GE reflux. pt d/c'd with plans to f/u with GI OP. Had repeat EGD 5/19 which showed ulcerated mass - awaiting path reports. Oncology already consulted. GI and surgery following. Pt to trial clears, but plan is for PICC and TPN (likely on d/c as well).     Pt evaluated by this service on 5/5 at which time wife reported issues since Jan 2022, worsened over past 4 weeks or so, reflux worse at night - pt stops eating by 4 PM, but even with lack of PO, reflux had worsened. Water even triggers symptoms. Hasn't been able to tolerate ONS. Has attempted more bland diet, smoothies, etc. without much success. Pt already on Dexilant and Pepcid 3-4x/day without improvement. Pt had lost 25 lb since January (12% BW in 4-5 months). Pt has lost another 10 lb in past 2 weeks (now total 35 lb since Jan 2022 - 17% in <6 months, significant). Unable to keep any food or liquid down for past 5 days. Agree with TPN. Would consider refeeding risk. Added on Phos to today's labs for baseline. I did meet with pt and wife at bedside today. Questions asked/answered regarding TPN. Very pleasant.  significant wt loss and PO intake reports alone meet severe malnutrition criteria. Deferred NFPE, pt is thin, but no severe wasting in face noted from glance. Recommend goal TPN of 6%AA, 17% dex @ 75 mL/hr + 250 mL 20% lipids daily to provide 2050 mL, 1972 kcal, 108 gm pro, and 306 gm dex (GIR 2.8 mg/kg/min). Nutritionally Significant Medications:  NS@ 100 mL/hr, reglan, protonix      Estimated Daily Nutrient Needs:  Energy Requirements Based On: Kcal/kg  Weight Used for Energy Requirements: Admission (~77 kg)  Energy (kcal/day): 6760-7216 (25-30 kcal/kg)     Protein (g/day):  (1.2-1.5 gm/kg)     Fluid (ml/day): 1 mL/kcal    Nutrition Related Findings:   Edema: No data recorded      Last BM: 05/16/22,      Wounds: None      Current Nutrition Therapies:  Diet: clear liquids  Nutrition Support: TPN pending        Anthropometric Measures:  Height: 6' 2\" (188 cm)  Ideal Body Weight (IBW): 190 lbs (86 kg)     Current Body Wt:  76.7 kg (169 lb 1.6 oz), 89 % IBW. Bed scale  Current BMI (kg/m2): 21.7  Usual Body Weight: 93 kg (205 lb)  % Weight Change (Calculated): -17.5  Weight Adjustment: No adjustment                 BMI Category: Normal weight (BMI 18.5-24. 9)    Wt Readings from Last 15 Encounters:   05/20/22 76.7 kg (169 lb 1.6 oz)   05/16/22 77.7 kg (171 lb 3.2 oz)   05/04/22 81.6 kg (180 lb)   03/31/16 88.5 kg (195 lb)   02/24/16 88.5 kg (195 lb)     Nutrition Diagnosis:   · Inadequate oral intake related to altered GI function (GOO) as evidenced by poor intake prior to admission,weight loss,nutrition support-parenteral nutrition      Nutrition Interventions:   Food and/or Nutrient Delivery: Continue current diet,Start parenteral nutrition  Nutrition Education/Counseling: No recommendations at this time  Coordination of Nutrition Care: Continue to monitor while inpatient       Goals:     Goals: other (specify)  Specify Other Goals: TPN to meet >90% of EEN within 3-4 days and then over next 7 days/ until next RD assessment    Nutrition Monitoring and Evaluation:   Behavioral-Environmental Outcomes: None identified  Food/Nutrient Intake Outcomes: Parenteral nutrition intake/tolerance,Diet advancement/tolerance  Physical Signs/Symptoms Outcomes: Biochemical data,GI status,Nutrition focused physical findings,Weight    Discharge Planning:    Parenteral nutrition    Recent Labs     05/20/22  0457 05/19/22 1906   GLU 84 96   BUN 20 26*   CREA 1.07 1.22    139   K 3.7 4.3   * 108   CO2 19* 23   CA 7.4* 8.9   PHOS 2.9  --        Recent Labs     05/20/22  0457 05/19/22  1906   ALT 9* 14   AST 5* 11*   AP 55 70   TBILI 1.2* 1.0   TP 5.4* 6.8   ALB 2.3* 3.1*   GLOB 3.1 3.7       No results for input(s): LAC in the last 72 hours.     Recent Labs     05/20/22  0613 05/19/22  1906   WBC 12.1* 13.0*   HGB 11.3* 13.8   HCT 33.8* 43.9    311       Willow Montelongo RD  Available via Meta[employer]

## 2022-05-20 NOTE — PROCEDURES
Order for PICC received and verified. Consent obtained from pt after risks, benefits, procedure explained and questions answered. PICC Placement Note    PRE-PROCEDURE VERIFICATION  Correct Procedure: yes  Correct Site:  yes  Temperature: Temp: 98.1 °F (36.7 °C), Temperature Source: Temp Source: Oral  Recent Labs     05/20/22  0613 05/20/22  0457 05/19/22  1906   BUN  --  20  --    CREA  --  1.07  --      --    < >   WBC 12.1*  --    < >    < > = values in this interval not displayed. Allergies: Patient has no known allergies. Education materials, including PICC Booklet, for PICC Care given to patient: yes. See Patient Education activity for further details. PROCEDURE DETAIL  A double lumen PICC line was started for TPN. The following documentation is in addition to the PICC properties in the lines/airways flowsheet :  Lot #: [id number]    Was xylocaine 1% used intradermally:  yes  Total catheter length: 41 (cm)  External catheter length:  0 (cm)  Vein Selection for PICC:right basilic  Central Line Bundle followed yes  Complication Related to Insertion: none    The placement was verified by ECG/Sapiens technology: The  tip location is in the superior vena cava. See ECG results for PICC tip placement. Report given to nurse Normapper Other is okay to use.     Leo Hartley RN

## 2022-05-20 NOTE — PROGRESS NOTES
[address]  [address] [facility], [address]       GI PROGRESS NOTE  Nkiita Pantoja, [facility] office  [phone] NP in-hospital cell phone M-F until 4:30  After 5pm or on weekends, please call  for physician on call      NAME: Sergio Mary   :  1965   MRN:  [id number]       Subjective:   He's feeling well, no more reflux or nausea/vomiting. Objective:     VITALS:   Last 24hrs VS reviewed since prior progress note. Most recent are:  Visit Vitals  /79 (BP 1 Location: Left upper arm, BP Patient Position: At rest)   Pulse 86   Temp 97.6 °F (36.4 °C)   Resp 18   Ht 6' 2\" (1.88 m)   Wt 76.7 kg (169 lb 1.6 oz)   SpO2 93%   BMI 21.71 kg/m²       PHYSICAL EXAM:  General: Cooperative, no acute distress    Neurologic:  Alert and oriented X 3. HEENT: EOMI, no scleral icterus   Lungs:  No increased WOB  Heart:  Regular rate  Abdomen: Soft, non-distended, no tenderness. Extremities: warm  Psych:   Good insight. Not anxious or agitated.     Lab Data Reviewed:     Recent Results (from the past 24 hour(s))   CBC W/O DIFF    Collection Time: 22  7:06 PM   Result Value Ref Range    WBC 13.0 (H) 4.1 - 11.1 K/uL    RBC 4.69 4.10 - 5.70 M/uL    HGB 13.8 12.1 - 17.0 g/dL    HCT 43.9 36.6 - 50.3 %    MCV 93.6 80.0 - 99.0 FL    MCH 29.4 26.0 - 34.0 PG    MCHC 31.4 30.0 - 36.5 g/dL    RDW 13.9 11.5 - 14.5 %    PLATELET 125 644 - 167 K/uL    MPV 9.4 8.9 - 12.9 FL    NRBC 0.0 0  WBC    ABSOLUTE NRBC 0.00 0.00 - 8.47 K/uL   METABOLIC PANEL, COMPREHENSIVE    Collection Time: 22  7:06 PM   Result Value Ref Range    Sodium 139 136 - 145 mmol/L    Potassium 4.3 3.5 - 5.1 mmol/L    Chloride 108 97 - 108 mmol/L    CO2 23 21 - 32 mmol/L    Anion gap 8 5 - 15 mmol/L    Glucose 96 65 - 100 mg/dL    BUN 26 (H) 6 - 20 MG/DL    Creatinine 1.22 0.70 - 1.30 MG/DL    BUN/Creatinine ratio 21 (H) 12 - 20      GFR est AA >60 >60 ml/min/1.73m2    GFR est non-AA >60 >60 ml/min/1.73m2    Calcium 8.9 8.5 - 10.1 MG/DL    Bilirubin, total 1.0 0.2 - 1.0 MG/DL    ALT (SGPT) 14 12 - 78 U/L    AST (SGOT) 11 (L) 15 - 37 U/L    Alk. phosphatase 70 45 - 117 U/L    Protein, total 6.8 6.4 - 8.2 g/dL    Albumin 3.1 (L) 3.5 - 5.0 g/dL    Globulin 3.7 2.0 - 4.0 g/dL    A-G Ratio 0.8 (L) 1.1 - 2.2     METABOLIC PANEL, COMPREHENSIVE    Collection Time: 05/20/22  4:57 AM   Result Value Ref Range    Sodium 140 136 - 145 mmol/L    Potassium 3.7 3.5 - 5.1 mmol/L    Chloride 113 (H) 97 - 108 mmol/L    CO2 19 (L) 21 - 32 mmol/L    Anion gap 8 5 - 15 mmol/L    Glucose 84 65 - 100 mg/dL    BUN 20 6 - 20 MG/DL    Creatinine 1.07 0.70 - 1.30 MG/DL    BUN/Creatinine ratio 19 12 - 20      GFR est AA >60 >60 ml/min/1.73m2    GFR est non-AA >60 >60 ml/min/1.73m2    Calcium 7.4 (L) 8.5 - 10.1 MG/DL    Bilirubin, total 1.2 (H) 0.2 - 1.0 MG/DL    ALT (SGPT) 9 (L) 12 - 78 U/L    AST (SGOT) 5 (L) 15 - 37 U/L    Alk. phosphatase 55 45 - 117 U/L    Protein, total 5.4 (L) 6.4 - 8.2 g/dL    Albumin 2.3 (L) 3.5 - 5.0 g/dL    Globulin 3.1 2.0 - 4.0 g/dL    A-G Ratio 0.7 (L) 1.1 - 2.2     CBC WITH AUTOMATED DIFF    Collection Time: 05/20/22  6:13 AM   Result Value Ref Range    WBC 12.1 (H) 4.1 - 11.1 K/uL    RBC 3.73 (L) 4.10 - 5.70 M/uL    HGB 11.3 (L) 12.1 - 17.0 g/dL    HCT 33.8 (L) 36.6 - 50.3 %    MCV 90.6 80.0 - 99.0 FL    MCH 30.3 26.0 - 34.0 PG    MCHC 33.4 30.0 - 36.5 g/dL    RDW 13.9 11.5 - 14.5 %    PLATELET 629 484 - 683 K/uL    MPV 9.4 8.9 - 12.9 FL    NRBC 0.0 0  WBC    ABSOLUTE NRBC 0.00 0.00 - 0.01 K/uL    NEUTROPHILS 85 (H) 32 - 75 %    LYMPHOCYTES 9 (L) 12 - 49 %    MONOCYTES 6 5 - 13 %    EOSINOPHILS 0 0 - 7 %    BASOPHILS 0 0 - 1 %    IMMATURE GRANULOCYTES 0 0.0 - 0.5 %    ABS. NEUTROPHILS 10.2 (H) 1.8 - 8.0 K/UL    ABS. LYMPHOCYTES 1.1 0.8 - 3.5 K/UL    ABS. MONOCYTES 0.7 0.0 - 1.0 K/UL    ABS. EOSINOPHILS 0.0 0.0 - 0.4 K/UL    ABS. BASOPHILS 0.0 0.0 - 0.1 K/UL    ABS. IMM.  GRANS. 0.0 0.00 - 0.04 K/UL    DF AUTOMATED              Assessment:     · Gastric outlet obstruction: EGD 5/19/22: ulcerated mass prepyloric area 3 x 4 cm very friable (biopsied), pyloric stenosis, Botox injection in pylorus channel  · History of esophageal cancer status post esophagectomy 2008     Patient Active Problem List   Diagnosis Code    Respiratory failure ([facility] J96.90    Severe protein-calorie malnutrition ([facility] E43    Pyloric stenosis, acquired K31.1    Gastric outlet obstruction K31.1     Plan:     · Trial CLD  · BID PPI  · Monitor CBC  · Plan for TPN, monitor lytes  · Follow-up on biopsies  · Surgery and oncology consulted     Signed By: Dinora Haley NP     5/20/2022  9:14 AM

## 2022-05-20 NOTE — PROGRESS NOTES
Reason for Readmission:   Hematemesis, gastric outlet obstruction           RUR Score/Risk Level:     11% Low    PCP: First and Last name:  Dr. Rama Rosa    Name of Practice: [facility]   Are you a current patient: Yes/No: Yes   Approximate date of last visit: THERESA   Can you participate in a virtual visit with your PCP: NA    Is a Care Conference indicated:  No      Did you attend your follow up appointment (s): If not, why not: Yes, with Dr. Mcdonough Rolling on 5/16/22. Was scheduled to see PCP Dr. Rama Rosa on 5/13/22 @ 3:30pm.         Resources/supports as identified by patient/family:   Wife, Geronimo Pink, [phone]       Top Challenges facing patient (as identified by patient/family and CM): Finances/Medication cost?     [employer]     Family    Support system or lack thereof? Wife Brandyn Setter  Living arrangements? Lives with wife         Self-care/ADLs/Cognition? Independent at baseline, no DME         Current Advanced Directive/Advance Care Plan:  NA           Plan for utilizing home health:   TBD             Transition of Care Plan:    Based on readmission, the patient's previous Plan of Care   has been evaluated and/or modified. The current Transition of Care Plan is:       CM to monitor and assist with transitional care planning needs. Patient is planned for DC home. Care Management Interventions  PCP Verified by CM: Yes (Dr. Rama Rosa)  Palliative Care Criteria Met (RRAT>21 & CHF Dx)?: No  Mode of Transport at Discharge:  Other (see comment) (Family)  Transition of Care Consult (CM Consult):  (TBD)  MyChart Signup: No  Discharge Durable Medical Equipment: No  Physical Therapy Consult: No  Occupational Therapy Consult: No  Speech Therapy Consult: No  Support Systems: Spouse/Significant Other  Confirm Follow Up Transport: Self  The Plan for Transition of Care is Related to the Following Treatment Goals : [address] [facility] Information Provided?: No  Discharge Location  Patient Expects to be Discharged to[de-identified] Home  Readmission Assessment  Number of days since last admission?: 8-30 days  Previous disposition: Home with Family  Who is being interviewed?: Patient  What was the patient's/caregiver's perception as to why they think they needed to return back to the hospital?: Other (Comment)  Did you visit your Primary Care Physician after you left the hospital, before you returned this time?: No  Why weren't you able to visit your PCP?: Did not have an appointment  Did you see a specialist, such as Cardiac, Pulmonary, Orthopedic Physician, etc. after you left the hospital?: Yes (Dr. Denisse Chavze 5/16/22)  Who advised the patient to return to the hospital?: Physician  Does the patient report anything that got in the way of taking their medications?: No    Jony Ware MS

## 2022-05-20 NOTE — CONSULTS
HEMATOLOGY / [address] [place] [address] 1965 Age: 62 y.o. Date of service: 01/23/20   Location: Laz Bojorquez MD    CHIEF COMPLAINT: N/V    DISEASE FEATURES  Gastric outlet obstruction; irregular pyloric mucosa on EGD    PREVIOUS TREATMENTS  . CURRENT TREATMENTS  . Disease Status: . Treatment Goal: . ACTIVE PROBLEMS   Gastric outlet obstruction  --friable mucosa in pylorus  --personal history of prostate cancer GE junction cancer and family hx of prostate and breast cancers  --25-30 lb weight loss      INTERIM HISTORY AND ASSESSMENT   He is a patient of Dr. Desirae Ashby. He had a CT as an outpatient a few weeks ago and patient reports it was negative. He is on clear liqs right now; can't assess benefit of EGD and botox injections as of yet. He is slated for PICC line and TPN, which will be essential  If he has cancer and needs resection of gastric remnant. I do not think this is recurrent esophageal, but a new cancer. I think he needs genetic testing/counseling. Past Medical History:   Diagnosis Date    Esophageal cancer [facility])     2008    Prostate cancer [facility])     2014    Pyloric stenosis, acquired 5/16/2022    Radiation exposure      Past Surgical History:   Procedure Laterality Date    HX ESOPHAGECTOMY N/A 2008    removal of tumor from esophagus and stomach    HX PROSTATECTOMY N/A     2014     Medications and Allergies have been reviewed and updated in the [employer] system. REVIEW OF SYSTEMS  Except as noted in the history and assessement above, all other systems are negative. EXAMINATION   Vital signs were reviewed abnormalities discussed above.    General: Looks well  HEENT: conjunctiva clear; no jaundice    Cervical nodes: none palpable  Supraclavicular nodes: none palpable  Axillary nodes: none palpable  Inguinal nodes: none palpable  Lungs: clear  CV: RRR  Abd: soft and non-tender; no HSM; no masses  Skin: no significant rashes  Ext: Edema: none; Tenderness: none  Neuro/Psych:  Gait: NL  Speech: NL   Affect: NL  Cognition: NL       Social History     Tobacco Use    Smoking status: Never Smoker    Smokeless tobacco: Never Used   Substance Use Topics    Alcohol use:  Yes     Alcohol/week: 1.0 - 2.0 standard drink     Types: 1 - 2 Glasses of wine per week      Family History   Problem Relation Age of Onset    Cancer Mother    Devan Vizcaino Father         Ihsan James MD

## 2022-05-20 NOTE — PROGRESS NOTES
[address] [facility] Adult  Hospitalist Group                                                                                          Hospitalist Progress Note  Reyes Andrade MD  Answering service: [phone] from in house phone        Date of Service:  2022  NAME:  Castillo Keane  :  1965  MRN:  [id number]      Admission Summary: This is a 66-year-old  male with a past medical history significant for esophageal cancer in  status post chemoradiation and surgery, history of prostate cancer in  status post prostatectomy, and hypercholesterolemia, who came to [facility]. Zully's status post EGD after he had vomiting of bilious and black material and unable to eat and drink for the last 4-5 days. EGD on 2019, study showed a large amount of melenic fluid in the stomach, all suctioned out, there was noted ulcerated mass in the pyloric area measures 3 x 4 cm. Multiple biopsies taken, Botox injection in the pylorus channel and hospitalist service is consulted for further evaluation and admission. The patient stated that he has poor appetite, eating little for the last 4-5 days, had multiple bilious and dark vomiting, has lost 30 pounds over 8 weeks. No abdominal pain, fever, abnormal bowel movement, urinary complaint, lower extremity swelling, cough, fever, left-sided chest pain, or palpitations. Last bowel movement was 4-5 days. His vital signs were blood pressure 121/75, pulse 97, temperature 98.9, saturation of oxygen 95% on room air. Interval history / Subjective:     He said he feels better, no vomiting     Assessment & Plan:     Gastric outlet obstruction, peripyloric ulcerated mass with history of esophageal cancer.   -s/p EGD on  there was ulcerated mass in prepyloric area, measured 3 x 4 cm, very friable, multiple biopsies taken to r/o malignancy verus inflammatory mass  -biopsy result pending   -on clear liquid diet  -s/p PICC line on   -start TPN  -continue iv protonix, reglan 10 mg iv q 8 hrs, prn zofran,   -GI, Surgical service and Oncology on board    Dehydration due to vomiting and poor oral intake  -continue normal saline     Hx of prostate cancer. -s/p surgery     Code status: Full Code  Prophylaxis: SCD  Care Plan discussed with: Patient, wife, nurse and CM  Anticipated Disposition: Home with [facility] Problems  Date Reviewed: 5/16/2022          Codes Class Noted POA    Gastric outlet obstruction ICD-10-CM: K31.1  ICD-9-CM: 537.0  5/19/2022 Unknown                 Vital Signs:    Last 24hrs VS reviewed since prior progress note. Most recent are:  Visit Vitals  /79 (BP 1 Location: Left upper arm, BP Patient Position: At rest)   Pulse 88   Temp 97.6 °F (36.4 °C)   Resp 18   Ht 6' 2\" (1.88 m)   Wt 76.7 kg (169 lb 1.6 oz)   SpO2 93%   BMI 21.71 kg/m²         Intake/Output Summary (Last 24 hours) at 5/20/2022 1016  Last data filed at 5/19/2022 1742  Gross per 24 hour   Intake 1000 ml   Output --   Net 1000 ml        Physical Examination:     I had a face to face encounter with this patient and independently examined them on 5/20/2022 as outlined below:          Constitutional:  No acute distress, cooperative, pleasant    ENT:  Oral mucosa moist, oropharynx benign. Resp:  CTA bilaterally. No wheezing/rhonchi/rales. No accessory muscle use. CV:  Regular rhythm, normal rate, no murmurs, gallops, rubs    GI:  Soft, non distended, non tender. normoactive bowel sounds, no hepatosplenomegaly     Musculoskeletal:  No edema     Neurologic:  Moves all extremities.   AAOx3, CN II-XII reviewed            Data Review:    Review and/or order of clinical lab test  Review and/or order of tests in the radiology section of CPT  Review and/or order of tests in the medicine section of CPT      Labs:     Recent Labs     05/20/22  0613 05/19/22  1906   WBC 12.1* 13.0*   HGB 11.3* 13.8   HCT 33.8* 43.9    311     Recent Labs     05/20/22  7862 05/19/22  1906    139   K 3.7 4.3   * 108   CO2 19* 23   BUN 20 26*   CREA 1.07 1.22   GLU 84 96   CA 7.4* 8.9     Recent Labs     05/20/22  0457 05/19/22 1906   ALT 9* 14   AP 55 70   TBILI 1.2* 1.0   TP 5.4* 6.8   ALB 2.3* 3.1*   GLOB 3.1 3.7     No results for input(s): INR, PTP, APTT, INREXT in the last 72 hours. No results for input(s): FE, TIBC, PSAT, FERR in the last 72 hours. No results found for: FOL, RBCF   No results for input(s): PH, PCO2, PO2 in the last 72 hours. No results for input(s): CPK, CKNDX, TROIQ in the last 72 hours.     No lab exists for component: CPKMB  No results found for: CHOL, CHOLX, CHLST, CHOLV, HDL, HDLP, LDL, LDLC, DLDLP, TGLX, TRIGL, TRIGP, CHHD, CHHDX  No results found for: [facility]  Lab Results   Component Value Date/Time    Color YELLOW 07/17/2009 02:26 AM    Appearance CLEAR 07/17/2009 02:26 AM    Specific gravity 1.022 07/17/2009 02:26 AM    pH (UA) 5.5 07/17/2009 02:26 AM    Protein TRACE (A) 07/17/2009 02:26 AM    Glucose NEGATIVE  07/17/2009 02:26 AM    Ketone NEGATIVE  07/17/2009 02:26 AM    Bilirubin NEGATIVE  07/17/2009 02:26 AM    Urobilinogen 1.0 07/17/2009 02:26 AM    Nitrites NEGATIVE  07/17/2009 02:26 AM    Leukocyte Esterase NEGATIVE  07/17/2009 02:26 AM    Epithelial cells 0-5 07/17/2009 02:26 AM    Bacteria NEGATIVE  07/17/2009 02:26 AM    WBC 0-4 07/17/2009 02:26 AM    RBC 10-20 07/17/2009 02:26 AM         Medications Reviewed:     Current Facility-Administered Medications   Medication Dose Route Frequency    sodium chloride (NS) flush 5-40 mL  5-40 mL IntraVENous Q8H    sodium chloride (NS) flush 5-40 mL  5-40 mL IntraVENous PRN    pantoprazole (PROTONIX) 40 mg in 0.9% sodium chloride 10 mL injection  40 mg IntraVENous Q12H    sodium chloride (NS) flush 5-40 mL  5-40 mL IntraVENous Q8H    sodium chloride (NS) flush 5-40 mL  5-40 mL IntraVENous PRN    ondansetron (ZOFRAN) injection 4 mg  4 mg IntraVENous Q4H PRN    0.9% sodium chloride infusion  100 mL/hr IntraVENous CONTINUOUS    acetaminophen (TYLENOL) tablet 650 mg  650 mg Oral Q4H PRN    HYDROcodone-acetaminophen (NORCO) 5-325 mg per tablet 1 Tablet  1 Tablet Oral Q6H PRN    docusate sodium (COLACE) capsule 100 mg  100 mg Oral BID PRN    polyethylene glycol (MIRALAX) packet 17 g  17 g Oral DAILY PRN    metoclopramide HCl (REGLAN) injection 10 mg  10 mg IntraVENous Q8H     ______________________________________________________________________  EXPECTED LENGTH OF STAY: - - -  ACTUAL LENGTH OF STAY:          1                 Ladan Mauricio MD

## 2022-05-20 NOTE — H&P
[address]  HISTORY AND PHYSICAL    Name:  Talia Marcano  MR#:  [id number]  :  1965  ACCOUNT #:  [de-identified]  ADMIT DATE:  2022      PRIMARY CARE PROVIDER:  Author Hao MD    SOURCE OF INFORMATION:  The patient. CHIEF COMPLAINT:  Hematemesis and not able to eat and drink, 4-5 days duration. HISTORY OF PRESENT ILLNESS:  This is a 80-year-old  male with a past medical history significant for esophageal cancer in  status post chemoradiation and surgery, history of prostate cancer in  status post prostatectomy, and hypercholesterolemia, who came to [facility]'s status post EGD after he had vomiting of bilious and black material and unable to eat and drink for the last 4-5 days. EGD on 2019, study showed a large amount of melenic fluid in the stomach, all suctioned out, there was noted ulcerated mass in the pyloric area measures 3 x 4 cm. Multiple biopsies taken, Botox injection in the pylorus channel and hospitalist service is consulted for further evaluation and admission. The patient stated that he has poor appetite, eating little for the last 4-5 days, had multiple bilious and dark vomiting, has lost 30 pounds over 8 weeks. No abdominal pain, fever, abnormal bowel movement, urinary complaint, lower extremity swelling, cough, fever, left-sided chest pain, or palpitations. Last bowel movement was 4-5 days. His vital signs were blood pressure 121/75, pulse 97, temperature 98.9, saturation of oxygen 95% on room air. REVIEW OF SYSTEMS:  Pertinent positive findings mentioned in HPI. All systems reviewed, no any other positive finding. PAST MEDICAL HISTORY  1.  Esophageal cancer in . 2.  Prostate cancer in . 3.  Pyloric stenosis. PAST SURGICAL HISTORY:  1. History of esophagectomy in . 2.  History of prostatectomy in . MEDICATIONS:  Prior to admission medications include,  1.   Metoclopramide 40 mg before breakfast, lunch, dinner, and at bedtime. 2.  Dexilant 60 mg p.o. daily. 3.  Flonase 2 sprays, both nostrils daily as needed. 4.  Multivitamin 1 tablet p.o. daily. 5.  Pepcid 20 mg p.o. 3 times daily as needed. ALLERGIES:  NO KNOWN DRUG ALLERGIES. SOCIAL HISTORY:  He lives with his wife. No tobacco or alcohol abuse. Ambulate independently. Code status, full code. FAMILY HISTORY:  Mother, history of cancer. Father, history of cancer. Code status, full code. PHYSICAL EXAMINATION:  VITAL SIGNS:  Blood pressure 110/81, pulse 98, temperature 98.9, respiratory rate 24, saturation of oxygen 92% on 2 L nasal cannula. GENERAL APPEARANCE:  The patient is alert, cooperative, not in distress. He appears his stated age. HEENT:  Pink conjunctivae. Anicteric sclerae. Moist tongue and buccal mucosa. LUNGS:  Clear to auscultation bilaterally. CHEST WALL:  No tenderness or deformity. CARDIOVASCULAR SYSTEM:  Regular rate and rhythm. S1 and S2 normal.  No murmur or gallop. ABDOMEN:  Soft, nontender. Bowel sounds normal.  No mass or organomegaly. EXTREMITIES:  No cyanosis or edema. SKIN:  No rash or lesion. CENTRAL NERVOUS SYSTEM:  Conscious, well oriented to time, place, and person. Motor 5/5. Sensation intact. Cranial nerves II-XII grossly intact. LABORATORY DATA:  Not available for today. ASSESSMENT:  1. Hematemesis and poor appetite, likely due to peripyloric ulcerated mass with history of esophageal cancer. 2.  Dehydration due to vomiting. 3.  History of prostate cancer. 4.  History of acute kidney injury. PLAN:  1. Hematemesis, poor appetite, likely due to peripyloric area ulcerated mass, unclear inflammatory vs malignancy, patient with history of esophageal cancer. Admit the patient to telemetry floor. Keep n.p.o., Protonix 40 mg b.i.d., normal saline at 100 mL/hour, p.r.n. Zofran, metoclopramide 10 mg IV q.8 p.r.n. Tylenol and oxycodone. GI is on board. consult Oncology and Surgical Service. The patient might need TPN. 2.  Dehydration, likely secondary to above. Normal saline at 100 mL/hour. Monitor electrolytes. 3.  History of prostate cancer, status post prostatectomy in 2014. Oncologist is consulted. 4.  History of acute kidney injury. No lab available. We will check his renal function test.  Avoid nephrotoxin. Continue normal saline. Monitor urine output. DVT prophylaxis. Sequential compressive device. FUNCTIONAL STATUS PRIOR TO ADMISSION:  The patient ambulates independently.       Zee Barnard MD      [id number]  D:  05/19/2022 17:57  T:  05/19/2022 20:16  JOB #:  [id number]

## 2022-05-21 ENCOUNTER — APPOINTMENT (OUTPATIENT)
Dept: GENERAL RADIOLOGY | Age: 57
DRG: 374 | End: 2022-05-21
Attending: INTERNAL MEDICINE
Payer: COMMERCIAL

## 2022-05-21 LAB
ALBUMIN SERPL-MCNC: 2.3 G/DL (ref 3.5–5)
ALBUMIN/GLOB SERPL: 0.7 {RATIO} (ref 1.1–2.2)
ALP SERPL-CCNC: 55 U/L (ref 45–117)
ALT SERPL-CCNC: 10 U/L (ref 12–78)
ANION GAP SERPL CALC-SCNC: 7 MMOL/L (ref 5–15)
AST SERPL-CCNC: 8 U/L (ref 15–37)
BILIRUB SERPL-MCNC: 0.9 MG/DL (ref 0.2–1)
BUN SERPL-MCNC: 19 MG/DL (ref 6–20)
BUN/CREAT SERPL: 18 (ref 12–20)
CALCIUM SERPL-MCNC: 8.6 MG/DL (ref 8.5–10.1)
CHLORIDE SERPL-SCNC: 103 MMOL/L (ref 97–108)
CO2 SERPL-SCNC: 24 MMOL/L (ref 21–32)
CREAT SERPL-MCNC: 1.07 MG/DL (ref 0.7–1.3)
ERYTHROCYTE [DISTWIDTH] IN BLOOD BY AUTOMATED COUNT: 13.9 % (ref 11.5–14.5)
GLOBULIN SER CALC-MCNC: 3.5 G/DL (ref 2–4)
GLUCOSE BLD STRIP.AUTO-MCNC: 129 MG/DL (ref 65–117)
GLUCOSE BLD STRIP.AUTO-MCNC: 135 MG/DL (ref 65–117)
GLUCOSE BLD STRIP.AUTO-MCNC: 137 MG/DL (ref 65–117)
GLUCOSE SERPL-MCNC: 466 MG/DL (ref 65–100)
HCT VFR BLD AUTO: 34.1 % (ref 36.6–50.3)
HGB BLD-MCNC: 10.9 G/DL (ref 12.1–17)
MAGNESIUM SERPL-MCNC: 2 MG/DL (ref 1.6–2.4)
MCH RBC QN AUTO: 29.5 PG (ref 26–34)
MCHC RBC AUTO-ENTMCNC: 32 G/DL (ref 30–36.5)
MCV RBC AUTO: 92.2 FL (ref 80–99)
NRBC # BLD: 0 K/UL (ref 0–0.01)
NRBC BLD-RTO: 0 PER 100 WBC
PHOSPHATE SERPL-MCNC: 3.3 MG/DL (ref 2.6–4.7)
PLATELET # BLD AUTO: 271 K/UL (ref 150–400)
PMV BLD AUTO: 9.7 FL (ref 8.9–12.9)
POTASSIUM SERPL-SCNC: 4.5 MMOL/L (ref 3.5–5.1)
PROT SERPL-MCNC: 5.8 G/DL (ref 6.4–8.2)
RBC # BLD AUTO: 3.7 M/UL (ref 4.1–5.7)
SERVICE CMNT-IMP: ABNORMAL
SODIUM SERPL-SCNC: 134 MMOL/L (ref 136–145)
TRIGL SERPL-MCNC: 132 MG/DL (ref ?–150)
WBC # BLD AUTO: 10.5 K/UL (ref 4.1–11.1)

## 2022-05-21 PROCEDURE — 74011000250 HC RX REV CODE- 250: Performed by: INTERNAL MEDICINE

## 2022-05-21 PROCEDURE — 74011000250 HC RX REV CODE- 250: Performed by: HOSPITALIST

## 2022-05-21 PROCEDURE — 84478 ASSAY OF TRIGLYCERIDES: CPT

## 2022-05-21 PROCEDURE — 74011250636 HC RX REV CODE- 250/636: Performed by: NURSE PRACTITIONER

## 2022-05-21 PROCEDURE — 65270000046 HC RM TELEMETRY

## 2022-05-21 PROCEDURE — 74011250637 HC RX REV CODE- 250/637: Performed by: SURGERY

## 2022-05-21 PROCEDURE — 80053 COMPREHEN METABOLIC PANEL: CPT

## 2022-05-21 PROCEDURE — 74018 RADEX ABDOMEN 1 VIEW: CPT

## 2022-05-21 PROCEDURE — 74011000258 HC RX REV CODE- 258: Performed by: HOSPITALIST

## 2022-05-21 PROCEDURE — 83735 ASSAY OF MAGNESIUM: CPT

## 2022-05-21 PROCEDURE — C9113 INJ PANTOPRAZOLE SODIUM, VIA: HCPCS | Performed by: HOSPITALIST

## 2022-05-21 PROCEDURE — 36415 COLL VENOUS BLD VENIPUNCTURE: CPT

## 2022-05-21 PROCEDURE — 85027 COMPLETE CBC AUTOMATED: CPT

## 2022-05-21 PROCEDURE — 74011250636 HC RX REV CODE- 250/636: Performed by: HOSPITALIST

## 2022-05-21 PROCEDURE — 82962 GLUCOSE BLOOD TEST: CPT

## 2022-05-21 PROCEDURE — 99232 SBSQ HOSP IP/OBS MODERATE 35: CPT | Performed by: SURGERY

## 2022-05-21 PROCEDURE — 84100 ASSAY OF PHOSPHORUS: CPT

## 2022-05-21 RX ORDER — LORAZEPAM 2 MG/ML
0.5 INJECTION INTRAMUSCULAR ONCE
Status: COMPLETED | OUTPATIENT
Start: 2022-05-21 | End: 2022-05-21

## 2022-05-21 RX ORDER — LIDOCAINE HYDROCHLORIDE 20 MG/ML
10 SOLUTION OROPHARYNGEAL
Status: DISCONTINUED | OUTPATIENT
Start: 2022-05-21 | End: 2022-05-24 | Stop reason: HOSPADM

## 2022-05-21 RX ADMIN — METOCLOPRAMIDE HYDROCHLORIDE 10 MG: 5 INJECTION INTRAMUSCULAR; INTRAVENOUS at 21:17

## 2022-05-21 RX ADMIN — SODIUM CHLORIDE, PRESERVATIVE FREE 10 ML: 5 INJECTION INTRAVENOUS at 06:00

## 2022-05-21 RX ADMIN — SODIUM CHLORIDE 50 ML/HR: 900 INJECTION, SOLUTION INTRAVENOUS at 16:09

## 2022-05-21 RX ADMIN — CALCIUM GLUCONATE: 94 INJECTION, SOLUTION INTRAVENOUS at 18:52

## 2022-05-21 RX ADMIN — SODIUM CHLORIDE, PRESERVATIVE FREE 10 ML: 5 INJECTION INTRAVENOUS at 15:09

## 2022-05-21 RX ADMIN — Medication 1 SPRAY: at 16:27

## 2022-05-21 RX ADMIN — SODIUM CHLORIDE, PRESERVATIVE FREE 40 MG: 5 INJECTION INTRAVENOUS at 09:00

## 2022-05-21 RX ADMIN — METOCLOPRAMIDE HYDROCHLORIDE 10 MG: 5 INJECTION INTRAMUSCULAR; INTRAVENOUS at 06:00

## 2022-05-21 RX ADMIN — SODIUM CHLORIDE, PRESERVATIVE FREE 10 ML: 5 INJECTION INTRAVENOUS at 21:17

## 2022-05-21 RX ADMIN — SODIUM CHLORIDE, PRESERVATIVE FREE 40 MG: 5 INJECTION INTRAVENOUS at 21:17

## 2022-05-21 RX ADMIN — LIDOCAINE HYDROCHLORIDE 10 ML: 20 SOLUTION ORAL at 16:32

## 2022-05-21 RX ADMIN — LORAZEPAM 0.5 MG: 2 INJECTION INTRAMUSCULAR; INTRAVENOUS at 21:17

## 2022-05-21 RX ADMIN — METOCLOPRAMIDE HYDROCHLORIDE 10 MG: 5 INJECTION INTRAMUSCULAR; INTRAVENOUS at 15:07

## 2022-05-21 NOTE — PROGRESS NOTES
[address]  [address] [facility] [address] [facility], [address]  [phone]                GI PROGRESS NOTE      NAME:   Marisol Horn   :    1965   MRN:    [id number]     Subjective:     Pt with increasing abdominal distension and vomiting of dark liquid emesis. Passing gas. Objective:     VITALS:   Last 24hrs VS reviewed since prior hospitalist progress note. Most recent are:  Visit Vitals  /79 (BP 1 Location: Left upper arm, BP Patient Position: Lying)   Pulse 86   Temp 97.8 °F (36.6 °C)   Resp 21   Ht 6' 2\" (1.88 m)   Wt 78.2 kg (172 lb 8 oz)   SpO2 94%   BMI 22.15 kg/m²       Intake/Output Summary (Last 24 hours) at 2022 1103  Last data filed at 2022 1709  Gross per 24 hour   Intake 240 ml   Output --   Net 240 ml        PHYSICAL EXAM:  General   NAD  EENT  Normocephalic, Atraumatic, PERRLA, EOMI, sclera clear  Neck   Supple, No thyromegaly or bruit  Respiratory   Clear To Auscultation bilaterally - no wheezes, rales, rhonchi, or crackles  Cardiology  Regular Rate and Rythmn - no murmurs, rubs or gallops  Abdominal  Soft, non-tender, hypoactive BS, no marked distension      Lab Data Reviewed     Medications: Reviewed    EGD 22: ulcerated mass prepyloric area 3 x 4 cm very friable (biopsied), pyloric stenosis, Botox injection in pylorus channel      Assessment/Plan     Marisol Horn is a 62 y.o.  male w history of esophageal cancer status post esophagectomy  who was admitted with gastric outlet obstruction. EGD per above with path pending. Continue TPN. NPO w ice chips.   Path pending  Surgery following  NGT to intermittent obstruction - will ask radiology to assist with placement     Attending Physician: Connie Carrion MD   Date/Time:  2022

## 2022-05-21 NOTE — PROGRESS NOTES
Bedside and Verbal shift change report given to NATALIA Gerardo (oncoming nurse) by Kevan Levy (offgoing nurse). Report included the following information SBAR, Kardex, ED Summary, Procedure Summary, Intake/Output, MAR, Recent Results and Cardiac Rhythm NSR.

## 2022-05-21 NOTE — PROGRESS NOTES
1867 RN notified MD regarding pt. Pt. had one episode of vomiting 0100, states that it is same type of emesis thats hes been having, reports no nausea. Started on TPN at 42/hr on 5/20 d/t inability to tolerate oral intake. 0119 No recommendations or interventions from MD.     Bedside shift change report given to Ramiro Carey (oncoming nurse) by Rosita Mcleod (offgoing nurse). Report included the following information SBAR, Kardex, Intake/Output, MAR, Accordion, Recent Results and Cardiac Rhythm NSR.

## 2022-05-21 NOTE — PROGRESS NOTES
Bedside and Verbal shift change report given to Idris Beavers RN (oncoming nurse) by Dougie Rubalcava RN (offgoing nurse). Report included the following information SBAR, Kardex, ED Summary, Procedure Summary, MAR, Recent Results and Cardiac Rhythm .

## 2022-05-21 NOTE — PROGRESS NOTES
-Hematology / Oncology ([facility]) -  -Primary Oncologist- dr Pam Jerome  -CC-\"I am alright\"        -S- Wife at bedside. PICC placed yesterday. Having some emesis when he falls asleep, TPN started yesterday pm    -O-    Patient Vitals for the past 24 hrs:   Temp Pulse Resp BP SpO2   05/21/22 0841 97.8 °F (36.6 °C) 86 21 115/79 94 %   05/21/22 0600 -- 93 -- -- --   05/21/22 0416 98.4 °F (36.9 °C) 89 20 123/79 91 %   05/21/22 0400 -- 86 -- -- --   05/21/22 0200 -- 83 -- -- --   05/21/22 0000 -- 78 -- -- --   05/20/22 2332 98.3 °F (36.8 °C) 86 20 125/78 93 %   05/20/22 2251 -- 98 -- -- --   05/20/22 2002 -- 86 -- -- --   05/20/22 1945 97.7 °F (36.5 °C) 83 20 123/82 93 %   05/20/22 1800 -- 90 -- -- --   05/20/22 1508 98.1 °F (36.7 °C) 89 18 127/83 95 %   05/20/22 1400 -- 91 -- -- --   05/20/22 1216 98.1 °F (36.7 °C) 89 18 137/86 93 %   05/20/22 1200 -- 90 -- -- --   05/20/22 1000 -- 88 -- -- --     No intake/output data recorded.   Gen: nad  Chest: bilateral breath sounds present  Ext: right picc in place, no swelling of RUE  Cardiac: rrr  Abd: s/nt    -Labs-    Recent Labs     05/21/22  0434 05/20/22  0613 05/20/22  0457 05/19/22  1906   WBC 10.5 12.1*  --  13.0*   HGB 10.9* 11.3*  --  13.8    258  --  311   ANEU  --  10.2*  --   --    *  --  140 139   K 4.5  --  3.7 4.3   *  --  84 96   BUN 19  --  20 26*   CREA 1.07  --  1.07 1.22   ALT 10*  --  9* 14   TBILI 0.9  --  1.2* 1.0   AP 55  --  55 70   CA 8.6  --  7.4* 8.9   MG 2.0  --   --   --    PHOS 3.3  --  2.9  --        -Imaging-       -Assessment + Plan-       *) Gastric outlet obstruction  --friable mucosa in pylorus  --personal history of prostate cancer, GE junction cancer, and family hx of prostate and breast cancers  --25-30 lb weight loss  -- PICC placed yesterday, TPN started yesterday pm  --Having more vomiting, NG may be beneficial  -- awaiting biopsy results, suspecting new maligancy  -- recommend genetic counseling    Please call with problems/concerns on Sunday, otherwise service will  on Monday

## 2022-05-21 NOTE — PROGRESS NOTES
[address] [facility] Adult  Hospitalist Group                                                                                          Hospitalist Progress Note  Frances Marmolejo MD  Answering service: [phone] from in house phone        Date of Service:  2022  NAME:  Yesenia Scanlon  :  1965  MRN:  [id number]      Admission Summary: This is a 59-year-old  male with a past medical history significant for esophageal cancer in  status post chemoradiation and surgery, history of prostate cancer in  status post prostatectomy, and hypercholesterolemia, who came to [facility]. Zully's status post EGD after he had vomiting of bilious and black material and unable to eat and drink for the last 4-5 days. EGD on 2019, study showed a large amount of melenic fluid in the stomach, all suctioned out, there was noted ulcerated mass in the pyloric area measures 3 x 4 cm. Multiple biopsies taken, Botox injection in the pylorus channel and hospitalist service is consulted for further evaluation and admission. The patient stated that he has poor appetite, eating little for the last 4-5 days, had multiple bilious and dark vomiting, has lost 30 pounds over 8 weeks. No abdominal pain, fever, abnormal bowel movement, urinary complaint, lower extremity swelling, cough, fever, left-sided chest pain, or palpitations. Last bowel movement was 4-5 days. His vital signs were blood pressure 121/75, pulse 97, temperature 98.9, saturation of oxygen 95% on room air. Interval history / Subjective:     He said he has vomiting last night x 3, and reflux, requested NGT placement     Assessment & Plan:     Gastric outlet obstruction, peripyloric ulcerated mass with history of esophageal cancer.   -s/p EGD on  there was ulcerated mass in prepyloric area, measured 3 x 4 cm, very friable, multiple biopsies taken to r/o malignancy verus inflammatory mass  -biopsy result pending   -had vomiting, stop clear liquid diet and keep NPO  -s/p PICC line on 5/20  -started TPN on 5/20  -to place NGT  -continue iv protonix, reglan 10 mg iv q 8 hrs, prn zofran,   -abdomen soft and non tender   -GI, Surgical service and Oncology on board    Dehydration due to vomiting and poor oral intake  -improving  -continue normal saline     Hx of prostate cancer. -s/p surgery     Code status: Full Code  Prophylaxis: SCD  Care Plan discussed with: Patient, wife, nurse and CM  Anticipated Disposition: Home with Family   Wife at [address] [facility] [address]  Date Reviewed: 5/16/2022          Codes Class Noted POA    Gastric outlet obstruction ICD-10-CM: K31.1  ICD-9-CM: 537.0  5/19/2022 Unknown                 Vital Signs:    Last 24hrs VS reviewed since prior progress note. Most recent are:  Visit Vitals  /79 (BP 1 Location: Left upper arm, BP Patient Position: Lying)   Pulse 86   Temp 97.8 °F (36.6 °C)   Resp 21   Ht 6' 2\" (1.88 m)   Wt 78.2 kg (172 lb 8 oz)   SpO2 94%   BMI 22.15 kg/m²         Intake/Output Summary (Last 24 hours) at 5/21/2022 6490  Last data filed at 5/20/2022 1709  Gross per 24 hour   Intake 480 ml   Output 1 ml   Net 479 ml        Physical Examination:     I had a face to face encounter with this patient and independently examined them on 5/21/2022 as outlined below:          Constitutional:  No acute distress, cooperative, pleasant    ENT:  Oral mucosa moist, oropharynx benign. Resp:  CTA bilaterally. No wheezing/rhonchi/rales. No accessory muscle use. CV:  Regular rhythm, normal rate, no murmurs, gallops, rubs    GI:  Soft, non distended, non tender. normoactive bowel sounds, no hepatosplenomegaly     Musculoskeletal:  No edema     Neurologic:  Moves all extremities.   AAOx3, CN II-XII reviewed            Data Review:    Review and/or order of clinical lab test  Review and/or order of tests in the radiology section of CPT  Review and/or order of tests in the medicine section of CPT      Labs: Recent Labs     05/21/22 0434 05/20/22  0613   WBC 10.5 12.1*   HGB 10.9* 11.3*   HCT 34.1* 33.8*    258     Recent Labs     05/21/22 0434 05/20/22 0457 05/19/22  1906   * 140 139   K 4.5 3.7 4.3    113* 108   CO2 24 19* 23   BUN 19 20 26*   CREA 1.07 1.07 1.22   * 84 96   CA 8.6 7.4* 8.9   MG 2.0  --   --    PHOS 3.3 2.9  --      Recent Labs     05/21/22 0434 05/20/22 0457 05/19/22  1906   ALT 10* 9* 14   AP 55 55 70   TBILI 0.9 1.2* 1.0   TP 5.8* 5.4* 6.8   ALB 2.3* 2.3* 3.1*   GLOB 3.5 3.1 3.7     No results for input(s): INR, PTP, APTT, INREXT, INREXT in the last 72 hours. No results for input(s): FE, TIBC, PSAT, FERR in the last 72 hours. No results found for: FOL, RBCF   No results for input(s): PH, PCO2, PO2 in the last 72 hours. No results for input(s): CPK, CKNDX, TROIQ in the last 72 hours.     No lab exists for component: CPKMB  Lab Results   Component Value Date/Time    Triglyceride 132 05/21/2022 04:34 AM     Lab Results   Component Value Date/Time    Glucose (POC) 135 (H) 05/21/2022 05:53 AM    Glucose (POC) 137 (H) 05/21/2022 12:28 AM     Lab Results   Component Value Date/Time    Color YELLOW 07/17/2009 02:26 AM    Appearance CLEAR 07/17/2009 02:26 AM    Specific gravity 1.022 07/17/2009 02:26 AM    pH (UA) 5.5 07/17/2009 02:26 AM    Protein TRACE (A) 07/17/2009 02:26 AM    Glucose NEGATIVE  07/17/2009 02:26 AM    Ketone NEGATIVE  07/17/2009 02:26 AM    Bilirubin NEGATIVE  07/17/2009 02:26 AM    Urobilinogen 1.0 07/17/2009 02:26 AM    Nitrites NEGATIVE  07/17/2009 02:26 AM    Leukocyte Esterase NEGATIVE  07/17/2009 02:26 AM    Epithelial cells 0-5 07/17/2009 02:26 AM    Bacteria NEGATIVE  07/17/2009 02:26 AM    WBC 0-4 07/17/2009 02:26 AM    RBC 10-20 07/17/2009 02:26 AM         Medications Reviewed:     Current Facility-Administered Medications   Medication Dose Route Frequency    TPN ADULT - CENTRAL   IntraVENous CONTINUOUS    sodium chloride (NS) flush 5-40 mL  5-40 mL IntraVENous Q8H    sodium chloride (NS) flush 5-40 mL  5-40 mL IntraVENous PRN    pantoprazole (PROTONIX) 40 mg in 0.9% sodium chloride 10 mL injection  40 mg IntraVENous Q12H    sodium chloride (NS) flush 5-40 mL  5-40 mL IntraVENous Q8H    sodium chloride (NS) flush 5-40 mL  5-40 mL IntraVENous PRN    ondansetron (ZOFRAN) injection 4 mg  4 mg IntraVENous Q4H PRN    0.9% sodium chloride infusion  50 mL/hr IntraVENous CONTINUOUS    acetaminophen (TYLENOL) tablet 650 mg  650 mg Oral Q4H PRN    HYDROcodone-acetaminophen (NORCO) 5-325 mg per tablet 1 Tablet  1 Tablet Oral Q6H PRN    docusate sodium (COLACE) capsule 100 mg  100 mg Oral BID PRN    polyethylene glycol (MIRALAX) packet 17 g  17 g Oral DAILY PRN    metoclopramide HCl (REGLAN) injection 10 mg  10 mg IntraVENous Q8H     ______________________________________________________________________  EXPECTED LENGTH OF STAY: - - -  ACTUAL LENGTH OF STAY:          2                 Calli Holliday MD

## 2022-05-22 LAB
ANION GAP SERPL CALC-SCNC: 5 MMOL/L (ref 5–15)
BUN SERPL-MCNC: 15 MG/DL (ref 6–20)
BUN/CREAT SERPL: 16 (ref 12–20)
CALCIUM SERPL-MCNC: 8.2 MG/DL (ref 8.5–10.1)
CHLORIDE SERPL-SCNC: 108 MMOL/L (ref 97–108)
CO2 SERPL-SCNC: 25 MMOL/L (ref 21–32)
COMMENT, HOLDF: NORMAL
CREAT SERPL-MCNC: 0.93 MG/DL (ref 0.7–1.3)
GLUCOSE BLD STRIP.AUTO-MCNC: 128 MG/DL (ref 65–117)
GLUCOSE BLD STRIP.AUTO-MCNC: 129 MG/DL (ref 65–117)
GLUCOSE BLD STRIP.AUTO-MCNC: 131 MG/DL (ref 65–117)
GLUCOSE BLD STRIP.AUTO-MCNC: 143 MG/DL (ref 65–117)
GLUCOSE SERPL-MCNC: 143 MG/DL (ref 65–100)
MAGNESIUM SERPL-MCNC: 1.6 MG/DL (ref 1.6–2.4)
PHOSPHATE SERPL-MCNC: 2.3 MG/DL (ref 2.6–4.7)
POTASSIUM SERPL-SCNC: 3.4 MMOL/L (ref 3.5–5.1)
SAMPLES BEING HELD,HOLD: NORMAL
SERVICE CMNT-IMP: ABNORMAL
SODIUM SERPL-SCNC: 138 MMOL/L (ref 136–145)

## 2022-05-22 PROCEDURE — 74011250636 HC RX REV CODE- 250/636: Performed by: HOSPITALIST

## 2022-05-22 PROCEDURE — 99232 SBSQ HOSP IP/OBS MODERATE 35: CPT | Performed by: SURGERY

## 2022-05-22 PROCEDURE — 74011000258 HC RX REV CODE- 258: Performed by: HOSPITALIST

## 2022-05-22 PROCEDURE — 74011000250 HC RX REV CODE- 250: Performed by: INTERNAL MEDICINE

## 2022-05-22 PROCEDURE — 65270000046 HC RM TELEMETRY

## 2022-05-22 PROCEDURE — 36415 COLL VENOUS BLD VENIPUNCTURE: CPT

## 2022-05-22 PROCEDURE — 74011000250 HC RX REV CODE- 250: Performed by: HOSPITALIST

## 2022-05-22 PROCEDURE — 83735 ASSAY OF MAGNESIUM: CPT

## 2022-05-22 PROCEDURE — C9113 INJ PANTOPRAZOLE SODIUM, VIA: HCPCS | Performed by: HOSPITALIST

## 2022-05-22 PROCEDURE — 74011250636 HC RX REV CODE- 250/636: Performed by: NURSE PRACTITIONER

## 2022-05-22 PROCEDURE — 84100 ASSAY OF PHOSPHORUS: CPT

## 2022-05-22 PROCEDURE — 80048 BASIC METABOLIC PNL TOTAL CA: CPT

## 2022-05-22 RX ORDER — TRAZODONE HYDROCHLORIDE 50 MG/1
50 TABLET ORAL
Status: DISCONTINUED | OUTPATIENT
Start: 2022-05-22 | End: 2022-05-24 | Stop reason: HOSPADM

## 2022-05-22 RX ORDER — LORAZEPAM 2 MG/ML
1 INJECTION INTRAMUSCULAR ONCE
Status: COMPLETED | OUTPATIENT
Start: 2022-05-22 | End: 2022-05-22

## 2022-05-22 RX ADMIN — SODIUM CHLORIDE, PRESERVATIVE FREE 10 ML: 5 INJECTION INTRAVENOUS at 13:53

## 2022-05-22 RX ADMIN — METOCLOPRAMIDE HYDROCHLORIDE 10 MG: 5 INJECTION INTRAMUSCULAR; INTRAVENOUS at 05:11

## 2022-05-22 RX ADMIN — METOCLOPRAMIDE HYDROCHLORIDE 10 MG: 5 INJECTION INTRAMUSCULAR; INTRAVENOUS at 13:53

## 2022-05-22 RX ADMIN — METOCLOPRAMIDE HYDROCHLORIDE 10 MG: 5 INJECTION INTRAMUSCULAR; INTRAVENOUS at 22:23

## 2022-05-22 RX ADMIN — SODIUM CHLORIDE, PRESERVATIVE FREE 40 MG: 5 INJECTION INTRAVENOUS at 09:15

## 2022-05-22 RX ADMIN — SODIUM CHLORIDE, PRESERVATIVE FREE 10 ML: 5 INJECTION INTRAVENOUS at 13:55

## 2022-05-22 RX ADMIN — SODIUM CHLORIDE, PRESERVATIVE FREE 40 MG: 5 INJECTION INTRAVENOUS at 22:23

## 2022-05-22 RX ADMIN — SODIUM CHLORIDE, PRESERVATIVE FREE 10 ML: 5 INJECTION INTRAVENOUS at 22:00

## 2022-05-22 RX ADMIN — Medication 1 SPRAY: at 09:17

## 2022-05-22 RX ADMIN — LORAZEPAM 1 MG: 2 INJECTION INTRAMUSCULAR at 22:23

## 2022-05-22 RX ADMIN — SODIUM CHLORIDE 50 ML/HR: 900 INJECTION, SOLUTION INTRAVENOUS at 12:12

## 2022-05-22 RX ADMIN — CALCIUM GLUCONATE: 94 INJECTION, SOLUTION INTRAVENOUS at 18:30

## 2022-05-22 RX ADMIN — I.V. FAT EMULSION 250 ML: 20 EMULSION INTRAVENOUS at 18:31

## 2022-05-22 RX ADMIN — ONDANSETRON HYDROCHLORIDE 4 MG: 2 SOLUTION INTRAMUSCULAR; INTRAVENOUS at 14:51

## 2022-05-22 NOTE — PROGRESS NOTES
Bedside shift change report given to Jay Fatima RN (oncoming nurse) by Jluis Brito RN (offgoing nurse). Report included the following information SBAR, Kardex, MAR and Recent Results.

## 2022-05-22 NOTE — PROGRESS NOTES
Progress Note    Patient: Sergio Mary MRN: [id number]  SSN: [id number]    YOB: 1965  Age: 62 y.o. Sex: male      Admit Date: 2022    3 Days Post-Op    Procedure:  Procedure(s):  ESOPHAGOGASTRODUODENOSCOPY (EGD) with Botox injection  INJECTION  ESOPHAGOGASTRODUODENAL (EGD) BIOPSY    Subjective:     Patient without complaints. Had a small amount of nausea today and spit up a little bit. No gas yet. Objective:     Visit Vitals  /81 (BP 1 Location: Left upper arm, BP Patient Position: At rest)   Pulse 88   Temp 97.5 °F (36.4 °C)   Resp 22   Ht 6' 2\" (1.88 m)   Wt 76.5 kg (168 lb 10.4 oz)   SpO2 93%   BMI 21.65 kg/m²       Temp (24hrs), Av °F (36.7 °C), Min:97.3 °F (36.3 °C), Max:98.7 °F (37.1 °C)      Physical Exam:    General alert and oriented no acute distress  Abdomen soft nontender nondistended  NG tube noted to have no suction. This was turned back on and approximately 400 cc of clear saliva came out. Data Review: images and reports reviewed    Lab Review: All lab results for the last 24 hours reviewed.   Recent Results (from the past 24 hour(s))   GLUCOSE, POC    Collection Time: 22 11:59 PM   Result Value Ref Range    Glucose (POC) 143 (H) 65 - 117 mg/dL    Performed by Jesus Mclaughlin    METABOLIC PANEL, BASIC    Collection Time: 22  5:02 AM   Result Value Ref Range    Sodium 138 136 - 145 mmol/L    Potassium 3.4 (L) 3.5 - 5.1 mmol/L    Chloride 108 97 - 108 mmol/L    CO2 25 21 - 32 mmol/L    Anion gap 5 5 - 15 mmol/L    Glucose 143 (H) 65 - 100 mg/dL    BUN 15 6 - 20 MG/DL    Creatinine 0.93 0.70 - 1.30 MG/DL    BUN/Creatinine ratio 16 12 - 20      GFR est AA >60 >60 ml/min/1.73m2    GFR est non-AA >60 >60 ml/min/1.73m2    Calcium 8.2 (L) 8.5 - 10.1 MG/DL   MAGNESIUM    Collection Time: 22  5:02 AM   Result Value Ref Range    Magnesium 1.6 1.6 - 2.4 mg/dL   PHOSPHORUS    Collection Time: 22  5:02 AM   Result Value Ref Range    Phosphorus 2.3 (L) 2.6 - 4.7 MG/DL   SAMPLES BEING HELD    Collection Time: 05/22/22  5:02 AM   Result Value Ref Range    SAMPLES BEING HELD 1lav     COMMENT        Add-on orders for these samples will be processed based on acceptable specimen integrity and analyte stability, which may vary by analyte. GLUCOSE, POC    Collection Time: 05/22/22  6:49 AM   Result Value Ref Range    Glucose (POC) 129 (H) 65 - 117 mg/dL    Performed by Jessica Velazquez    GLUCOSE, POC    Collection Time: 05/22/22 11:59 AM   Result Value Ref Range    Glucose (POC) 128 (H) 65 - 117 mg/dL    Performed by [facility]    GLUCOSE, POC    Collection Time: 05/22/22  6:39 PM   Result Value Ref Range    Glucose (POC) 131 (H) 65 - 117 mg/dL    Performed by [facility]        Assessment:     Hospital Problems  Date Reviewed: 5/16/2022          Codes Class Noted POA    Gastric outlet obstruction ICD-10-CM: K31.1  ICD-9-CM: 537.0  5/19/2022 Unknown              Plan/Recommendations/Medical Decision Making:   Clinically appears to be doing a little bit better today. He did have his NG tube clamped for an unknown amount of time but was not nauseated from it. Additionally the NG tube aspirate is now clear. Would continue on the NG tube suction for now until the amount drops down. Suspect that he may have some gastric distention from chronic obstruction and perhaps some atony. Continue TPN.   Continue PPI  Await pathology

## 2022-05-22 NOTE — PROGRESS NOTES
[address] [facility] Adult  Hospitalist Group                                                                                          Hospitalist Progress Note  Christine Ceballos MD  Answering service: [phone] from in house phone        Date of Service:  2022  NAME:  Hamida Lai  :  1965  MRN:  [id number]      Admission Summary: This is a 51-year-old  male with a past medical history significant for esophageal cancer in  status post chemoradiation and surgery, history of prostate cancer in  status post prostatectomy, and hypercholesterolemia, who came to [facility]. Zully's status post EGD after he had vomiting of bilious and black material and unable to eat and drink for the last 4-5 days. EGD on 2019, study showed a large amount of melenic fluid in the stomach, all suctioned out, there was noted ulcerated mass in the pyloric area measures 3 x 4 cm. Multiple biopsies taken, Botox injection in the pylorus channel and hospitalist service is consulted for further evaluation and admission. The patient stated that he has poor appetite, eating little for the last 4-5 days, had multiple bilious and dark vomiting, has lost 30 pounds over 8 weeks. No abdominal pain, fever, abnormal bowel movement, urinary complaint, lower extremity swelling, cough, fever, left-sided chest pain, or palpitations. Last bowel movement was 4-5 days. His vital signs were blood pressure 121/75, pulse 97, temperature 98.9, saturation of oxygen 95% on room air. Interval history / Subjective:     He said he feels better, had good sleep     Assessment & Plan:     Gastric outlet obstruction, peripyloric ulcerated mass with history of esophageal cancer.   -s/p EGD on  there was ulcerated mass in prepyloric area, measured 3 x 4 cm, very friable, multiple biopsies taken to r/o malignancy verus inflammatory mass  -biopsy result pending   -NPO  -s/p PICC line on   -started TPN on   -s/p NGT  -continue iv protonix, reglan 10 mg iv q 8 hrs, prn zofran,   -abdomen soft and non tender   -GI, Surgical service and Oncology on board    Dehydration due to vomiting and poor oral intake  -improving  -continue normal saline     Hx of prostate cancer. -s/p surgery     Code status: Full Code  Prophylaxis: SCD  Care Plan discussed with: Patient, wife, nurse and CM  Anticipated Disposition: Home with Family   Wife at [facility]  Date Reviewed: 5/16/2022          Codes Class Noted POA    Gastric outlet obstruction ICD-10-CM: K31.1  ICD-9-CM: 537.0  5/19/2022 Unknown                 Vital Signs:    Last 24hrs VS reviewed since prior progress note. Most recent are:  Visit Vitals  /83 (BP 1 Location: Left upper arm, BP Patient Position: At rest)   Pulse 81   Temp 97.8 °F (36.6 °C)   Resp 18   Ht 6' 2\" (1.88 m)   Wt 76.5 kg (168 lb 10.4 oz)   SpO2 96%   BMI 21.65 kg/m²         Intake/Output Summary (Last 24 hours) at 5/22/2022 0944  Last data filed at 5/21/2022 1609  Gross per 24 hour   Intake 999 ml   Output 550 ml   Net 449 ml        Physical Examination:     I had a face to face encounter with this patient and independently examined them on 5/22/2022 as outlined below:          Constitutional:  No acute distress, cooperative, pleasant    ENT:  Oral mucosa moist, oropharynx benign. Resp:  CTA bilaterally. No wheezing/rhonchi/rales. No accessory muscle use. CV:  Regular rhythm, normal rate, no murmurs, gallops, rubs    GI:  Soft, non distended, non tender. normoactive bowel sounds, no hepatosplenomegaly     Musculoskeletal:  No edema     Neurologic:  Moves all extremities.   AAOx3, CN II-XII reviewed            Data Review:    Review and/or order of clinical lab test  Review and/or order of tests in the radiology section of CPT  Review and/or order of tests in the medicine section of CPT      Labs:     Recent Labs     05/21/22  0434 05/20/22  0613   WBC 10.5 12.1*   HGB 10.9* 11.3* HCT 34.1* 33.8*    258     Recent Labs     05/22/22  0502 05/21/22  0434 05/20/22  0457    134* 140   K 3.4* 4.5 3.7    103 113*   CO2 25 24 19*   BUN 15 19 20   CREA 0.93 1.07 1.07   * 466* 84   CA 8.2* 8.6 7.4*   MG 1.6 2.0  --    PHOS 2.3* 3.3 2.9     Recent Labs     05/21/22  0434 05/20/22  0457 05/19/22  1906   ALT 10* 9* 14   AP 55 55 70   TBILI 0.9 1.2* 1.0   TP 5.8* 5.4* 6.8   ALB 2.3* 2.3* 3.1*   GLOB 3.5 3.1 3.7     No results for input(s): INR, PTP, APTT, INREXT, INREXT in the last 72 hours. No results for input(s): FE, TIBC, PSAT, FERR in the last 72 hours. No results found for: FOL, RBCF   No results for input(s): PH, PCO2, PO2 in the last 72 hours. No results for input(s): CPK, CKNDX, TROIQ in the last 72 hours.     No lab exists for component: CPKMB  Lab Results   Component Value Date/Time    Triglyceride 132 05/21/2022 04:34 AM     Lab Results   Component Value Date/Time    Glucose (POC) 129 (H) 05/22/2022 06:49 AM    Glucose (POC) 143 (H) 05/21/2022 11:59 PM    Glucose (POC) 129 (H) 05/21/2022 05:26 PM    Glucose (POC) 135 (H) 05/21/2022 05:53 AM    Glucose (POC) 137 (H) 05/21/2022 12:28 AM     Lab Results   Component Value Date/Time    Color YELLOW 07/17/2009 02:26 AM    Appearance CLEAR 07/17/2009 02:26 AM    Specific gravity 1.022 07/17/2009 02:26 AM    pH (UA) 5.5 07/17/2009 02:26 AM    Protein TRACE (A) 07/17/2009 02:26 AM    Glucose NEGATIVE  07/17/2009 02:26 AM    Ketone NEGATIVE  07/17/2009 02:26 AM    Bilirubin NEGATIVE  07/17/2009 02:26 AM    Urobilinogen 1.0 07/17/2009 02:26 AM    Nitrites NEGATIVE  07/17/2009 02:26 AM    Leukocyte Esterase NEGATIVE  07/17/2009 02:26 AM    Epithelial cells 0-5 07/17/2009 02:26 AM    Bacteria NEGATIVE  07/17/2009 02:26 AM    WBC 0-4 07/17/2009 02:26 AM    RBC 10-20 07/17/2009 02:26 AM         Medications Reviewed:     Current Facility-Administered Medications   Medication Dose Route Frequency    TPN ADULT - CENTRAL IntraVENous CONTINUOUS    lidocaine (XYLOCAINE) 2 % viscous solution 10 mL  10 mL Mouth/Throat Q4H PRN    phenol throat spray (CHLORASEPTIC) 1 Spray  1 Spray Oral PRN    sodium chloride (NS) flush 5-40 mL  5-40 mL IntraVENous Q8H    sodium chloride (NS) flush 5-40 mL  5-40 mL IntraVENous PRN    pantoprazole (PROTONIX) 40 mg in 0.9% sodium chloride 10 mL injection  40 mg IntraVENous Q12H    sodium chloride (NS) flush 5-40 mL  5-40 mL IntraVENous Q8H    sodium chloride (NS) flush 5-40 mL  5-40 mL IntraVENous PRN    ondansetron (ZOFRAN) injection 4 mg  4 mg IntraVENous Q4H PRN    0.9% sodium chloride infusion  50 mL/hr IntraVENous CONTINUOUS    acetaminophen (TYLENOL) tablet 650 mg  650 mg Oral Q4H PRN    HYDROcodone-acetaminophen (NORCO) 5-325 mg per tablet 1 Tablet  1 Tablet Oral Q6H PRN    docusate sodium (COLACE) capsule 100 mg  100 mg Oral BID PRN    polyethylene glycol (MIRALAX) packet 17 g  17 g Oral DAILY PRN    metoclopramide HCl (REGLAN) injection 10 mg  10 mg IntraVENous Q8H     ______________________________________________________________________  EXPECTED LENGTH OF STAY: - - -  ACTUAL LENGTH OF STAY:          3                 Jerome Montalvo MD

## 2022-05-23 ENCOUNTER — TELEPHONE (OUTPATIENT)
Dept: SURGERY | Age: 57
End: 2022-05-23

## 2022-05-23 LAB
ALBUMIN SERPL-MCNC: 2.4 G/DL (ref 3.5–5)
ALBUMIN/GLOB SERPL: 0.7 {RATIO} (ref 1.1–2.2)
ALP SERPL-CCNC: 66 U/L (ref 45–117)
ALT SERPL-CCNC: 10 U/L (ref 12–78)
ANION GAP SERPL CALC-SCNC: 7 MMOL/L (ref 5–15)
AST SERPL-CCNC: 10 U/L (ref 15–37)
BILIRUB SERPL-MCNC: 0.6 MG/DL (ref 0.2–1)
BUN SERPL-MCNC: 16 MG/DL (ref 6–20)
BUN/CREAT SERPL: 16 (ref 12–20)
CALCIUM SERPL-MCNC: 8.3 MG/DL (ref 8.5–10.1)
CHLORIDE SERPL-SCNC: 107 MMOL/L (ref 97–108)
CO2 SERPL-SCNC: 25 MMOL/L (ref 21–32)
CREAT SERPL-MCNC: 0.98 MG/DL (ref 0.7–1.3)
GLOBULIN SER CALC-MCNC: 3.4 G/DL (ref 2–4)
GLUCOSE BLD STRIP.AUTO-MCNC: 121 MG/DL (ref 65–117)
GLUCOSE BLD STRIP.AUTO-MCNC: 129 MG/DL (ref 65–117)
GLUCOSE BLD STRIP.AUTO-MCNC: 133 MG/DL (ref 65–117)
GLUCOSE SERPL-MCNC: 145 MG/DL (ref 65–100)
MAGNESIUM SERPL-MCNC: 1.6 MG/DL (ref 1.6–2.4)
PHOSPHATE SERPL-MCNC: 3.3 MG/DL (ref 2.6–4.7)
POTASSIUM SERPL-SCNC: 3.7 MMOL/L (ref 3.5–5.1)
PROT SERPL-MCNC: 5.8 G/DL (ref 6.4–8.2)
SERVICE CMNT-IMP: ABNORMAL
SODIUM SERPL-SCNC: 139 MMOL/L (ref 136–145)

## 2022-05-23 PROCEDURE — 80053 COMPREHEN METABOLIC PANEL: CPT

## 2022-05-23 PROCEDURE — 65270000046 HC RM TELEMETRY

## 2022-05-23 PROCEDURE — 74011000258 HC RX REV CODE- 258: Performed by: HOSPITALIST

## 2022-05-23 PROCEDURE — 36415 COLL VENOUS BLD VENIPUNCTURE: CPT

## 2022-05-23 PROCEDURE — 83735 ASSAY OF MAGNESIUM: CPT

## 2022-05-23 PROCEDURE — C9113 INJ PANTOPRAZOLE SODIUM, VIA: HCPCS | Performed by: HOSPITALIST

## 2022-05-23 PROCEDURE — 74011250636 HC RX REV CODE- 250/636: Performed by: NURSE PRACTITIONER

## 2022-05-23 PROCEDURE — 84100 ASSAY OF PHOSPHORUS: CPT

## 2022-05-23 PROCEDURE — 74011000250 HC RX REV CODE- 250: Performed by: HOSPITALIST

## 2022-05-23 PROCEDURE — 74011250636 HC RX REV CODE- 250/636: Performed by: HOSPITALIST

## 2022-05-23 PROCEDURE — 82962 GLUCOSE BLOOD TEST: CPT

## 2022-05-23 PROCEDURE — 74011000250 HC RX REV CODE- 250: Performed by: INTERNAL MEDICINE

## 2022-05-23 RX ORDER — LORAZEPAM 2 MG/ML
1 INJECTION INTRAMUSCULAR ONCE
Status: COMPLETED | OUTPATIENT
Start: 2022-05-23 | End: 2022-05-23

## 2022-05-23 RX ORDER — MAGNESIUM SULFATE 1 G/100ML
1 INJECTION INTRAVENOUS ONCE
Status: COMPLETED | OUTPATIENT
Start: 2022-05-23 | End: 2022-05-23

## 2022-05-23 RX ADMIN — METOCLOPRAMIDE HYDROCHLORIDE 10 MG: 5 INJECTION INTRAMUSCULAR; INTRAVENOUS at 18:22

## 2022-05-23 RX ADMIN — SODIUM CHLORIDE 50 ML/HR: 900 INJECTION, SOLUTION INTRAVENOUS at 18:04

## 2022-05-23 RX ADMIN — SODIUM CHLORIDE, PRESERVATIVE FREE 40 MG: 5 INJECTION INTRAVENOUS at 20:32

## 2022-05-23 RX ADMIN — SODIUM CHLORIDE, PRESERVATIVE FREE 40 MG: 5 INJECTION INTRAVENOUS at 08:31

## 2022-05-23 RX ADMIN — METOCLOPRAMIDE HYDROCHLORIDE 10 MG: 5 INJECTION INTRAMUSCULAR; INTRAVENOUS at 22:20

## 2022-05-23 RX ADMIN — SODIUM CHLORIDE, PRESERVATIVE FREE 10 ML: 5 INJECTION INTRAVENOUS at 07:04

## 2022-05-23 RX ADMIN — METOCLOPRAMIDE HYDROCHLORIDE 10 MG: 5 INJECTION INTRAMUSCULAR; INTRAVENOUS at 07:05

## 2022-05-23 RX ADMIN — SODIUM CHLORIDE, PRESERVATIVE FREE 10 ML: 5 INJECTION INTRAVENOUS at 18:05

## 2022-05-23 RX ADMIN — I.V. FAT EMULSION 250 ML: 20 EMULSION INTRAVENOUS at 18:00

## 2022-05-23 RX ADMIN — MAGNESIUM SULFATE 1 G: 1 INJECTION INTRAVENOUS at 20:32

## 2022-05-23 RX ADMIN — SODIUM CHLORIDE, PRESERVATIVE FREE 10 ML: 5 INJECTION INTRAVENOUS at 07:05

## 2022-05-23 RX ADMIN — LORAZEPAM 1 MG: 2 INJECTION INTRAMUSCULAR; INTRAVENOUS at 22:20

## 2022-05-23 RX ADMIN — SODIUM CHLORIDE, PRESERVATIVE FREE 10 ML: 5 INJECTION INTRAVENOUS at 22:00

## 2022-05-23 RX ADMIN — SODIUM CHLORIDE, PRESERVATIVE FREE 10 ML: 5 INJECTION INTRAVENOUS at 22:20

## 2022-05-23 RX ADMIN — CALCIUM GLUCONATE: 94 INJECTION, SOLUTION INTRAVENOUS at 18:00

## 2022-05-23 NOTE — TELEPHONE ENCOUNTER
===========[id number]=================  Mon 23-May-22 11:08a  ======================================  Name: Tana Black Dr.: Laureano Quintero        Patient: Katie Mora          PtDOB: [birth year]          Phone: [phone]        Message: Et 2031 Please call dr Amanuel Calix needs to speak to dr Solo Theodore    --------------------------------------  Message History  Account: [de-identified]  Taken:  [facility] 23-May-2022 11:08a JLG  Serial#: [id number]  ===========[id number]

## 2022-05-23 NOTE — PROGRESS NOTES
While in the room Dr. Cristina Rolon, Thoracic Surgeon at [facility] called. Had a wonderful conversation. She works with Dr. Helena Cox, whom I know quite well. I think transfer to [facility] is a great idea.

## 2022-05-23 NOTE — TELEPHONE ENCOUNTER
Spoke with Sudhir Soliman with Dr. Xu Siu  who wants to speak with Dr. Adan Rehman regarding patient plan of care. .Dr. Adan Rehman is speaking with Dr. Xu Siu on phone,

## 2022-05-23 NOTE — PROGRESS NOTES
[address], [facility] [address]  [phone]        Given cancer diagnosis this am by Dr. Jenifer Avilez. Understandably upset and ready for plan/next steps. Physically feeling much better with NG tube to suction, getting TPN.  Reached out to surgery team.     Isatu Alonso, CHARLENE

## 2022-05-23 NOTE — PROGRESS NOTES
Bedside and Verbal shift change report given to NATALIA Morales (oncoming nurse) by Radha Marion RN (offgoing nurse). Report included the following information SBAR, Kardex, ED Summary, Procedure Summary, MAR, Recent Results and Cardiac Rhythm .

## 2022-05-23 NOTE — PROGRESS NOTES
[address] [facility] Adult  Hospitalist Group                                                                                          Hospitalist Progress Note  Angela Camarillo MD  Answering service: [phone] from in house phone        Date of Service:  2022  NAME:  Claudio Madrid  :  1965  MRN:  [id number]      Admission Summary: This is a 77-year-old  male with a past medical history significant for esophageal cancer in  status post chemoradiation and surgery, history of prostate cancer in  status post prostatectomy, and hypercholesterolemia, who came to [facility]. Zully's status post EGD after he had vomiting of bilious and black material and unable to eat and drink for the last 4-5 days. EGD on 2019, study showed a large amount of melenic fluid in the stomach, all suctioned out, there was noted ulcerated mass in the pyloric area measures 3 x 4 cm. Multiple biopsies taken, Botox injection in the pylorus channel and hospitalist service is consulted for further evaluation and admission. The patient stated that he has poor appetite, eating little for the last 4-5 days, had multiple bilious and dark vomiting, has lost 30 pounds over 8 weeks. No abdominal pain, fever, abnormal bowel movement, urinary complaint, lower extremity swelling, cough, fever, left-sided chest pain, or palpitations. Last bowel movement was 4-5 days. His vital signs were blood pressure 121/75, pulse 97, temperature 98.9, saturation of oxygen 95% on room air. Interval history / Subjective:     He said he feels the same, he said he is told about path report,      Assessment & Plan:     Gastric outlet obstruction, peripyloric ulcerated mass with history of esophageal cancer.   -s/p EGD on  there was ulcerated mass in prepyloric area, measured 3 x 4 cm, very friable, multiple biopsies taken to r/o malignancy verus inflammatory mass  -Path result high grade malignant neoplasm, favor poorly differentiated adenocarcinoma, pending immunostains  -NPO  -s/p PICC line on 5/20  -started TPN on 5/20  -s/p NGT  -continue iv protonix, reglan 10 mg iv q 8 hrs, prn zofran,   -abdomen soft and non tender   -GI, Surgical service and Oncology on board    Dehydration due to vomiting and poor oral intake  -improving  -continue normal saline     Hx of prostate cancer. -s/p surgery     Code status: Full Code  Prophylaxis: SCD  Care Plan discussed with: Patient, wife, nurse and CM  Anticipated Disposition: Home with Family   Wife at [address] [place] [address]  Date Reviewed: 5/16/2022          Codes Class Noted POA    Gastric outlet obstruction ICD-10-CM: K31.1  ICD-9-CM: 537.0  5/19/2022 Unknown                 Vital Signs:    Last 24hrs VS reviewed since prior progress note. Most recent are:  Visit Vitals  /84 (BP 1 Location: Left upper arm, BP Patient Position: Lying)   Pulse 88   Temp 97.5 °F (36.4 °C)   Resp 24   Ht 6' 2\" (1.88 m)   Wt 77.8 kg (171 lb 9.6 oz)   SpO2 94%   BMI 22.03 kg/m²         Intake/Output Summary (Last 24 hours) at 5/23/2022 1425  Last data filed at 5/23/2022 0700  Gross per 24 hour   Intake --   Output 450 ml   Net -450 ml        Physical Examination:     I had a face to face encounter with this patient and independently examined them on 5/23/2022 as outlined below:          Constitutional:  No acute distress, cooperative, pleasant    ENT:  Oral mucosa moist, oropharynx benign. Resp:  CTA bilaterally. No wheezing/rhonchi/rales. No accessory muscle use. CV:  Regular rhythm, normal rate, no murmurs, gallops, rubs    GI:  Soft, non distended, non tender. normoactive bowel sounds, no hepatosplenomegaly     Musculoskeletal:  No edema     Neurologic:  Moves all extremities.   AAOx3, CN II-XII reviewed            Data Review:    Review and/or order of clinical lab test  Review and/or order of tests in the radiology section of CPT  Review and/or order of tests in the medicine section of CPT      Labs:     Recent Labs     05/21/22  0434   WBC 10.5   HGB 10.9*   HCT 34.1*        Recent Labs     05/23/22  0633 05/22/22  0502 05/21/22  0434    138 134*   K 3.7 3.4* 4.5    108 103   CO2 25 25 24   BUN 16 15 19   CREA 0.98 0.93 1.07   * 143* 466*   CA 8.3* 8.2* 8.6   MG 1.6 1.6 2.0   PHOS 3.3 2.3* 3.3     Recent Labs     05/23/22  0633 05/21/22  0434   ALT 10* 10*   AP 66 55   TBILI 0.6 0.9   TP 5.8* 5.8*   ALB 2.4* 2.3*   GLOB 3.4 3.5     No results for input(s): INR, PTP, APTT, INREXT, INREXT in the last 72 hours. No results for input(s): FE, TIBC, PSAT, FERR in the last 72 hours. No results found for: FOL, RBCF   No results for input(s): PH, PCO2, PO2 in the last 72 hours. No results for input(s): CPK, CKNDX, TROIQ in the last 72 hours.     No lab exists for component: CPKMB  Lab Results   Component Value Date/Time    Triglyceride 132 05/21/2022 04:34 AM     Lab Results   Component Value Date/Time    Glucose (POC) 133 (H) 05/23/2022 06:24 AM    Glucose (POC) 129 (H) 05/23/2022 12:42 AM    Glucose (POC) 131 (H) 05/22/2022 06:39 PM    Glucose (POC) 128 (H) 05/22/2022 11:59 AM    Glucose (POC) 129 (H) 05/22/2022 06:49 AM     Lab Results   Component Value Date/Time    Color YELLOW 07/17/2009 02:26 AM    Appearance CLEAR 07/17/2009 02:26 AM    Specific gravity 1.022 07/17/2009 02:26 AM    pH (UA) 5.5 07/17/2009 02:26 AM    Protein TRACE (A) 07/17/2009 02:26 AM    Glucose NEGATIVE  07/17/2009 02:26 AM    Ketone NEGATIVE  07/17/2009 02:26 AM    Bilirubin NEGATIVE  07/17/2009 02:26 AM    Urobilinogen 1.0 07/17/2009 02:26 AM    Nitrites NEGATIVE  07/17/2009 02:26 AM    Leukocyte Esterase NEGATIVE  07/17/2009 02:26 AM    Epithelial cells 0-5 07/17/2009 02:26 AM    Bacteria NEGATIVE  07/17/2009 02:26 AM    WBC 0-4 07/17/2009 02:26 AM    RBC 10-20 07/17/2009 02:26 AM         Medications Reviewed:     Current Facility-Administered Medications   Medication Dose Route Frequency    TPN ADULT - CENTRAL   IntraVENous CONTINUOUS    TPN ADULT - CENTRAL   IntraVENous CONTINUOUS    fat emulsion 20% (LIPOSYN, INTRAlipid) infusion 250 mL  250 mL IntraVENous QPM    [Held by provider] traZODone (DESYREL) tablet 50 mg  50 mg Oral QHS PRN    lidocaine (XYLOCAINE) 2 % viscous solution 10 mL  10 mL Mouth/Throat Q4H PRN    phenol throat spray (CHLORASEPTIC) 1 Spray  1 Spray Oral PRN    sodium chloride (NS) flush 5-40 mL  5-40 mL IntraVENous Q8H    sodium chloride (NS) flush 5-40 mL  5-40 mL IntraVENous PRN    pantoprazole (PROTONIX) 40 mg in 0.9% sodium chloride 10 mL injection  40 mg IntraVENous Q12H    sodium chloride (NS) flush 5-40 mL  5-40 mL IntraVENous Q8H    sodium chloride (NS) flush 5-40 mL  5-40 mL IntraVENous PRN    ondansetron (ZOFRAN) injection 4 mg  4 mg IntraVENous Q4H PRN    0.9% sodium chloride infusion  50 mL/hr IntraVENous CONTINUOUS    acetaminophen (TYLENOL) tablet 650 mg  650 mg Oral Q4H PRN    HYDROcodone-acetaminophen (NORCO) 5-325 mg per tablet 1 Tablet  1 Tablet Oral Q6H PRN    docusate sodium (COLACE) capsule 100 mg  100 mg Oral BID PRN    polyethylene glycol (MIRALAX) packet 17 g  17 g Oral DAILY PRN    metoclopramide HCl (REGLAN) injection 10 mg  10 mg IntraVENous Q8H     ______________________________________________________________________  EXPECTED LENGTH OF STAY: - - -  ACTUAL LENGTH OF STAY:          4                 Jerome Ragsdale MD

## 2022-05-23 NOTE — CONSULTS
HEMATOLOGY / [address] [place] [address] 1965 Age: 62 y.o. Date of service: 01/23/20   Location: Karolina Arellano MD    CHIEF COMPLAINT: N/V    DISEASE FEATURES  Gastric outlet obstruction; irregular pyloric mucosa on EGD  Positive for adenocarcinoma    PREVIOUS TREATMENTS  . CURRENT TREATMENTS  . Disease Status: . Treatment Goal: . ACTIVE PROBLEMS   Adenocarcinoma of the pre-pyloric channel  --friable mucosa in pylorus  --personal history of prostate cancer and GE junction cancer and family hx of prostate and breast cancers  --25-30 lb weight loss  --gastric outlet obstruction      INTERIM HISTORY AND ASSESSMENT   He is a patient of Dr. Zee Flores. Biopsy positive for adenocarcinoma. Details forthcoming, but we presume it is a second primary, not a recurrence of his GE juction tumor from 14 years ago. I discussed his case with Dr. Pietro Gosselin who did his original surgery with Dr. Danielle Ruth. Dr. Pietro Gosselin will come by and discuss his thoughts on the case. The patient is interested in getting opinions at [address] [place] [address]. The easiest logistical maneuver would be a transfer to [facility]. I'd recommend a PET CT, but we can't get one here for an inpatient. The NGT has stopped his vomiting and TPN has been started. Ideally, he would get 10-14 days of TPN, I think, before a major surgery. This would be about right for transfer, getting a PET, getting final path, and planning the surgical team. All of this would be more difficult as an outpatient. Past Medical History:   Diagnosis Date    Esophageal cancer [facility])     2008    Prostate cancer [facility])     2014    Pyloric stenosis, acquired 5/16/2022    Radiation exposure      Past Surgical History:   Procedure Laterality Date    HX ESOPHAGECTOMY N/A 2008    removal of tumor from esophagus and stomach    HX PROSTATECTOMY N/A     2014     Medications and Allergies have been reviewed and updated in the [facility] system.     REVIEW OF SYSTEMS  Except as noted in the history and assessement above, all other systems are negative. EXAMINATION   Vital signs were reviewed abnormalities discussed above. General: Looks well  HEENT: conjunctiva clear; no jaundice    Ext: Edema: none; Tenderness: none  Neuro/Psych:  Gait: NL  Speech: NL   Affect: NL  Cognition: NL       Social History     Tobacco Use    Smoking status: Never Smoker    Smokeless tobacco: Never Used   Substance Use Topics    Alcohol use:  Yes     Alcohol/week: 1.0 - 2.0 standard drink     Types: 1 - 2 Glasses of wine per week      Family History   Problem Relation Age of Onset    Cancer Mother    Romana Evans Father         Lizabeth Slater MD

## 2022-05-24 ENCOUNTER — APPOINTMENT (OUTPATIENT)
Dept: GENERAL RADIOLOGY | Age: 57
DRG: 374 | End: 2022-05-24
Attending: HOSPITALIST
Payer: COMMERCIAL

## 2022-05-24 VITALS
RESPIRATION RATE: 21 BRPM | TEMPERATURE: 98.3 F | HEIGHT: 74 IN | BODY MASS INDEX: 22.1 KG/M2 | HEART RATE: 81 BPM | SYSTOLIC BLOOD PRESSURE: 125 MMHG | OXYGEN SATURATION: 97 % | WEIGHT: 172.2 LBS | DIASTOLIC BLOOD PRESSURE: 84 MMHG

## 2022-05-24 LAB
ALBUMIN SERPL-MCNC: 2.3 G/DL (ref 3.5–5)
ALBUMIN/GLOB SERPL: 0.6 {RATIO} (ref 1.1–2.2)
ALP SERPL-CCNC: 86 U/L (ref 45–117)
ALT SERPL-CCNC: 44 U/L (ref 12–78)
ANION GAP SERPL CALC-SCNC: 6 MMOL/L (ref 5–15)
AST SERPL-CCNC: 51 U/L (ref 15–37)
BILIRUB SERPL-MCNC: 0.7 MG/DL (ref 0.2–1)
BUN SERPL-MCNC: 18 MG/DL (ref 6–20)
BUN/CREAT SERPL: 20 (ref 12–20)
CALCIUM SERPL-MCNC: 8.9 MG/DL (ref 8.5–10.1)
CHLORIDE SERPL-SCNC: 108 MMOL/L (ref 97–108)
CO2 SERPL-SCNC: 23 MMOL/L (ref 21–32)
COMMENT, HOLDF: NORMAL
COVID-19 RAPID TEST, COVR: NOT DETECTED
CREAT SERPL-MCNC: 0.92 MG/DL (ref 0.7–1.3)
GLOBULIN SER CALC-MCNC: 4 G/DL (ref 2–4)
GLUCOSE BLD STRIP.AUTO-MCNC: 133 MG/DL (ref 65–117)
GLUCOSE BLD STRIP.AUTO-MCNC: 139 MG/DL (ref 65–117)
GLUCOSE SERPL-MCNC: 133 MG/DL (ref 65–100)
POTASSIUM SERPL-SCNC: 3.8 MMOL/L (ref 3.5–5.1)
PROT SERPL-MCNC: 6.3 G/DL (ref 6.4–8.2)
SAMPLES BEING HELD,HOLD: NORMAL
SARS-COV-2, COV2: NORMAL
SERVICE CMNT-IMP: ABNORMAL
SERVICE CMNT-IMP: ABNORMAL
SODIUM SERPL-SCNC: 137 MMOL/L (ref 136–145)
SOURCE, COVRS: NORMAL

## 2022-05-24 PROCEDURE — U0005 INFEC AGEN DETEC AMPLI PROBE: HCPCS

## 2022-05-24 PROCEDURE — 82962 GLUCOSE BLOOD TEST: CPT

## 2022-05-24 PROCEDURE — 87635 SARS-COV-2 COVID-19 AMP PRB: CPT

## 2022-05-24 PROCEDURE — 74011000250 HC RX REV CODE- 250: Performed by: HOSPITALIST

## 2022-05-24 PROCEDURE — 71045 X-RAY EXAM CHEST 1 VIEW: CPT

## 2022-05-24 PROCEDURE — 80053 COMPREHEN METABOLIC PANEL: CPT

## 2022-05-24 PROCEDURE — 74011250636 HC RX REV CODE- 250/636: Performed by: HOSPITALIST

## 2022-05-24 PROCEDURE — C9113 INJ PANTOPRAZOLE SODIUM, VIA: HCPCS | Performed by: HOSPITALIST

## 2022-05-24 PROCEDURE — 74011000250 HC RX REV CODE- 250: Performed by: INTERNAL MEDICINE

## 2022-05-24 PROCEDURE — 36415 COLL VENOUS BLD VENIPUNCTURE: CPT

## 2022-05-24 RX ORDER — METOCLOPRAMIDE HYDROCHLORIDE 5 MG/ML
10 INJECTION INTRAMUSCULAR; INTRAVENOUS EVERY 8 HOURS
Qty: 1 ML | Refills: 0 | Status: SHIPPED
Start: 2022-05-24 | End: 2022-10-03

## 2022-05-24 RX ADMIN — METOCLOPRAMIDE HYDROCHLORIDE 10 MG: 5 INJECTION INTRAMUSCULAR; INTRAVENOUS at 14:56

## 2022-05-24 RX ADMIN — SODIUM CHLORIDE, PRESERVATIVE FREE 40 MG: 5 INJECTION INTRAVENOUS at 08:20

## 2022-05-24 RX ADMIN — SODIUM CHLORIDE, PRESERVATIVE FREE 10 ML: 5 INJECTION INTRAVENOUS at 06:07

## 2022-05-24 RX ADMIN — SODIUM CHLORIDE, PRESERVATIVE FREE 10 ML: 5 INJECTION INTRAVENOUS at 06:00

## 2022-05-24 RX ADMIN — METOCLOPRAMIDE HYDROCHLORIDE 10 MG: 5 INJECTION INTRAMUSCULAR; INTRAVENOUS at 06:06

## 2022-05-24 RX ADMIN — SODIUM CHLORIDE 50 ML/HR: 900 INJECTION, SOLUTION INTRAVENOUS at 14:52

## 2022-05-24 NOTE — PROGRESS NOTES
Cm received a page that the patient is ready for discharge to [facility]-  contacted [facility] [phone] and they confirmed they provided report to the staff nurse and cm also entered information on the ambulance form packet. Staff nurse and MD completing EMTALA form when cm provided ambulance form.

## 2022-05-24 NOTE — PROGRESS NOTES
[address] [facility] Adult  Hospitalist Group                                                                                          Hospitalist Progress Note  Chan Willis MD  Answering service: [phone] from in house phone        Date of Service:  2022  NAME:  Annie Woodruff  :  1965  MRN:  [id number]      Admission Summary: This is a 68-year-old  male with a past medical history significant for esophageal cancer in  status post chemoradiation and surgery, history of prostate cancer in  status post prostatectomy, and hypercholesterolemia, who came to [facility]. Zully's status post EGD after he had vomiting of bilious and black material and unable to eat and drink for the last 4-5 days. EGD on 2019, study showed a large amount of melenic fluid in the stomach, all suctioned out, there was noted ulcerated mass in the pyloric area measures 3 x 4 cm. Multiple biopsies taken, Botox injection in the pylorus channel and hospitalist service is consulted for further evaluation and admission. The patient stated that he has poor appetite, eating little for the last 4-5 days, had multiple bilious and dark vomiting, has lost 30 pounds over 8 weeks. No abdominal pain, fever, abnormal bowel movement, urinary complaint, lower extremity swelling, cough, fever, left-sided chest pain, or palpitations. Last bowel movement was 4-5 days. His vital signs were blood pressure 121/75, pulse 97, temperature 98.9, saturation of oxygen 95% on room air. Interval history / Subjective:     He said he feels the same,   [facility] transfer center [phone] was contacted for transfer, waiting for bed and they requested for COVID 19 PCR test, and call back number giver for further notification     Assessment & Plan:     Gastric outlet obstruction, peripyloric ulcerated mass with history of esophageal cancer.   -s/p EGD on  there was ulcerated mass in prepyloric area, measured 3 x 4 cm, very friable, multiple biopsies taken to r/o malignancy verus inflammatory mass  -Path result high grade malignant neoplasm, favor poorly differentiated adenocarcinoma, pending immunostains  -NPO  -s/p PICC line on 5/20  -started TPN on 5/20  -s/p NGT  -continue iv protonix, reglan 10 mg iv q 8 hrs, prn zofran,   -abdomen soft and non tender   -GI, Surgical service and Oncology on board    Dehydration due to vomiting and poor oral intake  -improved  -continue normal saline     Hx of prostate cancer. -s/p surgery     Code status: Full Code  Prophylaxis: SCD  Care Plan discussed with: Patient, wife, nurse and CM  Anticipated Disposition: To transfer to [facility] in 24-48 hrs  Wife at [address] [place] [address]  Date Reviewed: 5/16/2022          Codes Class Noted POA    Gastric outlet obstruction ICD-10-CM: K31.1  ICD-9-CM: 537.0  5/19/2022 Unknown                 Vital Signs:    Last 24hrs VS reviewed since prior progress note. Most recent are:  Visit Vitals  /74 (BP 1 Location: Left upper arm, BP Patient Position: At rest)   Pulse 83   Temp 98.4 °F (36.9 °C)   Resp 17   Ht 6' 2\" (1.88 m)   Wt 78.1 kg (172 lb 3.2 oz)   SpO2 96%   BMI 22.11 kg/m²         Intake/Output Summary (Last 24 hours) at 5/24/2022 0901  Last data filed at 5/23/2022 2029  Gross per 24 hour   Intake --   Output 350 ml   Net -350 ml        Physical Examination:     I had a face to face encounter with this patient and independently examined them on 5/24/2022 as outlined below:          Constitutional:  No acute distress, cooperative, pleasant    ENT:  Oral mucosa moist, oropharynx benign. Resp:  CTA bilaterally. No wheezing/rhonchi/rales. No accessory muscle use. CV:  Regular rhythm, normal rate, no murmurs, gallops, rubs    GI:  Soft, non distended, non tender. normoactive bowel sounds, no hepatosplenomegaly     Musculoskeletal:  No edema     Neurologic:  Moves all extremities.   AAOx3, CN II-XII reviewed            Data Review:    Review and/or order of clinical lab test  Review and/or order of tests in the radiology section of CPT  Review and/or order of tests in the medicine section of CPT      Labs:     No results for input(s): WBC, HGB, HCT, PLT, HGBEXT, HCTEXT, PLTEXT, HGBEXT, HCTEXT, PLTEXT in the last 72 hours. Recent Labs     05/23/22  0633 05/22/22  0502    138   K 3.7 3.4*    108   CO2 25 25   BUN 16 15   CREA 0.98 0.93   * 143*   CA 8.3* 8.2*   MG 1.6 1.6   PHOS 3.3 2.3*     Recent Labs     05/23/22  0633   ALT 10*   AP 66   TBILI 0.6   TP 5.8*   ALB 2.4*   GLOB 3.4     No results for input(s): INR, PTP, APTT, INREXT, INREXT in the last 72 hours. No results for input(s): FE, TIBC, PSAT, FERR in the last 72 hours. No results found for: FOL, RBCF   No results for input(s): PH, PCO2, PO2 in the last 72 hours. No results for input(s): CPK, CKNDX, TROIQ in the last 72 hours.     No lab exists for component: CPKMB  Lab Results   Component Value Date/Time    Triglyceride 132 05/21/2022 04:34 AM     Lab Results   Component Value Date/Time    Glucose (POC) 133 (H) 05/24/2022 01:12 AM    Glucose (POC) 121 (H) 05/23/2022 05:45 PM    Glucose (POC) 133 (H) 05/23/2022 06:24 AM    Glucose (POC) 129 (H) 05/23/2022 12:42 AM    Glucose (POC) 131 (H) 05/22/2022 06:39 PM     Lab Results   Component Value Date/Time    Color YELLOW 07/17/2009 02:26 AM    Appearance CLEAR 07/17/2009 02:26 AM    Specific gravity 1.022 07/17/2009 02:26 AM    pH (UA) 5.5 07/17/2009 02:26 AM    Protein TRACE (A) 07/17/2009 02:26 AM    Glucose NEGATIVE  07/17/2009 02:26 AM    Ketone NEGATIVE  07/17/2009 02:26 AM    Bilirubin NEGATIVE  07/17/2009 02:26 AM    Urobilinogen 1.0 07/17/2009 02:26 AM    Nitrites NEGATIVE  07/17/2009 02:26 AM    Leukocyte Esterase NEGATIVE  07/17/2009 02:26 AM    Epithelial cells 0-5 07/17/2009 02:26 AM    Bacteria NEGATIVE  07/17/2009 02:26 AM    WBC 0-4 07/17/2009 02:26 AM    RBC 10-20 07/17/2009 02:26 AM         Medications Reviewed:     Current Facility-Administered Medications   Medication Dose Route Frequency    TPN ADULT - CENTRAL   IntraVENous CONTINUOUS    fat emulsion 20% (LIPOSYN, INTRAlipid) infusion 250 mL  250 mL IntraVENous QPM    [Held by provider] traZODone (DESYREL) tablet 50 mg  50 mg Oral QHS PRN    lidocaine (XYLOCAINE) 2 % viscous solution 10 mL  10 mL Mouth/Throat Q4H PRN    phenol throat spray (CHLORASEPTIC) 1 Spray  1 Spray Oral PRN    sodium chloride (NS) flush 5-40 mL  5-40 mL IntraVENous Q8H    sodium chloride (NS) flush 5-40 mL  5-40 mL IntraVENous PRN    pantoprazole (PROTONIX) 40 mg in 0.9% sodium chloride 10 mL injection  40 mg IntraVENous Q12H    sodium chloride (NS) flush 5-40 mL  5-40 mL IntraVENous Q8H    sodium chloride (NS) flush 5-40 mL  5-40 mL IntraVENous PRN    ondansetron (ZOFRAN) injection 4 mg  4 mg IntraVENous Q4H PRN    0.9% sodium chloride infusion  50 mL/hr IntraVENous CONTINUOUS    acetaminophen (TYLENOL) tablet 650 mg  650 mg Oral Q4H PRN    HYDROcodone-acetaminophen (NORCO) 5-325 mg per tablet 1 Tablet  1 Tablet Oral Q6H PRN    docusate sodium (COLACE) capsule 100 mg  100 mg Oral BID PRN    polyethylene glycol (MIRALAX) packet 17 g  17 g Oral DAILY PRN    metoclopramide HCl (REGLAN) injection 10 mg  10 mg IntraVENous Q8H     ______________________________________________________________________  EXPECTED LENGTH OF STAY: - - -  ACTUAL LENGTH OF STAY:          5                 Jerome Beckwith MD

## 2022-05-24 NOTE — PROGRESS NOTES
Bedside shift change report given to NATALIA Castorena (oncoming nurse) by Claire Lopez (offgoing nurse). Report included the following information SBAR, Kardex, Intake/Output, MAR, Accordion, Recent Results and Cardiac Rhythm NSR.

## 2022-05-24 NOTE — DISCHARGE SUMMARY
Discharge Summary       PATIENT ID: Amos Comer  MRN: [id number]   YOB: 1965    DATE OF ADMISSION: 5/19/2022  2:38 PM    DATE OF DISCHARGE: 5/24/2022   PRIMARY CARE PROVIDER: Ila Holden MD     ATTENDING PHYSICIAN: Marcos Rogers MD   DISCHARGING PROVIDER: Ladan Mauricio MD    To contact this individual call [phone] and ask the  to page. If unavailable ask to be transferred the Adult Hospitalist Department. CONSULTATIONS: IP CONSULT TO GENERAL SURGERY  IP CONSULT TO ONCOLOGY  IP CONSULT TO RADIOLOGY    PROCEDURES/SURGERIES: Procedure(s):  ESOPHAGOGASTRODUODENOSCOPY (EGD) with Botox injection  INJECTION  ESOPHAGOGASTRODUODENAL (EGD) BIOPSY       ADMISSION SUMMARY AND HOSPITAL COURSE:     This is a 63-year-old  male with a past medical history significant for esophageal cancer in 2008 status post chemoradiation and surgery, history of prostate cancer in 2015 status post prostatectomy, and hypercholesterolemia, who came to [facility]. Zully's status post EGD after he had vomiting of bilious and black material and unable to eat and drink for the last 4-5 days. EGD on 05/19/2019, study showed a large amount of melenic fluid in the stomach, all suctioned out, there was noted ulcerated mass in the pyloric area measures 3 x 4 cm.  Multiple biopsies taken, Botox injection in the pylorus channel and hospitalist service is consulted for further evaluation and admission.  The patient stated that he has poor appetite, eating little for the last 4-5 days, had multiple bilious and dark vomiting, has lost 30 pounds over 8 weeks.  No abdominal pain, fever, abnormal bowel movement, urinary complaint, lower extremity swelling, cough, fever, left-sided chest pain, or palpitations.  Last bowel movement was 4-5 days.  His vital signs were blood pressure 121/75, pulse 97, temperature 98.9, saturation of oxygen 95% on room air.     Gastric outlet obstruction, peripyloric ulcerated malignancy with history of esophageal cancer. -s/p EGD on 5/19 there was ulcerated mass in prepyloric area, measured 3 x 4 cm, very friable, multiple biopsies taken to r/o malignancy verus inflammatory mass  -Path result reported high grade malignant neoplasm, favor poorly differentiated adenocarcinoma, pending immunostains  -NPO  -s/p PICC line on 5/20  -started TPN on 5/20  -s/p NGT  -continue iv protonix, reglan 10 mg iv q 8 hrs, prn zofran,   -abdomen soft and non tender   -GI, Surgical service and Oncology on board  -Transfer to [facility]  Dehydration due to vomiting and poor oral intake  -improving  -continue normal saline   Hx of prostate cancer. -s/p surgery     Code status: Full Code  Prophylaxis: SCD    DISCHARGE DIAGNOSES / PLAN:      Gastric outlet obstruction secondary to peripyloric Adenocarcinoma  -NPO  -s/p PICC line on 5/20  -on TPN started on 5/20  -s/p NGT  -continue iv protonix, reglan 10 mg iv q 8 hrs, prn zofran,   -further management per [facility] Surgical Oncology and Oncology team  Dehydration due to vomiting and poor oral intake  -improving  -continue normal saline   Hx of prostate cancer. -s/p surgery        NOTIFY YOUR PHYSICIAN FOR ANY OF THE FOLLOWING:   Fever over 101 degrees for 24 hours. Chest pain, shortness of breath, fever, chills, nausea, vomiting, diarrhea, change in mentation, falling, weakness, bleeding. Severe pain or pain not relieved by medications, as well as any other signs or symptoms that you may have questions about. FOLLOW UP   Transfer to Inpatient [facility]     DIET: NPO, on TPN    ACTIVITY: Activity as tolerated    EQUIPMENT needed: None    DISCHARGE MEDICATIONS:  Current Discharge Medication List      START taking these medications    Details   metoclopramide HCl (REGLAN) 5 mg/mL injection 2 mL by IntraVENous route every eight (8) hours.   Qty: 1 mL, Refills: 0  Start date: 5/24/2022      pantoprazole 4 mg/mL in 0.9% sodium chloride injection 10 mL by IntraVENous route every twelve (12) hours for 30 days. Qty: 600 mL, Refills: 0  Start date: 5/24/2022, End date: 6/23/2022      phenol throat spray (CHLORASEPTIC) 1.4 % spray Take 1 Spray by mouth as needed for Sore throat. Qty: 20 mL, Refills: 0  Start date: 5/24/2022         CONTINUE these medications which have NOT CHANGED    Details   fluticasone propionate (Flonase Allergy Relief) 50 mcg/actuation nasal spray 2 Sprays by Both Nostrils route daily as needed.          STOP taking these medications       metoclopramide HCl (Reglan) 10 mg tablet Comments:   Reason for Stopping:         dexlansoprazole (Dexilant) 60 mg CpDB capsule (delayed release) Comments:   Reason for Stopping:         multivitamin (ONE A DAY) tablet Comments:   Reason for Stopping:         famotidine (Pepcid) 20 mg tablet Comments:   Reason for Stopping:               DISPOSITION:    Home With:   OT  PT  HH  RN       Long term SNF/Inpatient Rehab    Independent/assisted living    Hospice   x Transfer to [facility]       PATIENT CONDITION AT DISCHARGE:     Functional status    Poor     Deconditioned    x Independent      Cognition    x Lucid     Forgetful     Dementia      Catheters/lines (plus indication)    Manjarrez    x PICC     PEG     None      Code status    x Full code     DNR      PHYSICAL EXAMINATION AT DISCHARGE:  Patient Vitals for the past 24 hrs:   Temp Pulse Resp BP SpO2   05/24/22 1614 98.3 °F (36.8 °C) 85 21 125/84 97 %   05/24/22 1416 -- 78 -- -- --   05/24/22 1257 97.9 °F (36.6 °C) 84 16 132/82 98 %   05/24/22 1159 -- 82 -- -- --   05/24/22 1019 -- 86 -- -- --   05/24/22 0839 98.4 °F (36.9 °C) 83 17 113/74 96 %   05/24/22 0800 -- -- -- -- 96 %   05/24/22 0500 -- 83 -- -- --   05/24/22 0400 -- 87 -- -- --   05/24/22 0328 98.6 °F (37 °C) 84 19 108/75 97 %   05/24/22 0200 -- 67 -- -- --   05/24/22 0030 97.8 °F (36.6 °C) 82 19 125/76 96 %   05/23/22 2200 -- 92 -- -- --   05/23/22 2023 97.8 °F (36.6 °C) 86 14 124/78 95 %   05/23/22 2000 -- 91 -- -- --   05/23/22 1818 -- 78 -- -- --     General:          Alert, cooperative, no distress, appears stated age. HEENT:           NGT in place, Atraumatic, anicteric sclerae, pink conjunctivae                          No oral ulcers, mucosa moist, throat clear, dentition fair  Neck:               Supple, symmetrical  Lungs:             Clear to auscultation bilaterally. No Wheezing or Rhonchi. No rales. Chest wall:      No tenderness  No Accessory muscle use. Heart:              Regular  rhythm,  No  murmur   No edema  Abdomen:        Soft, non-tender. Not distended. Bowel sounds normal  Extremities:     No cyanosis. No clubbing,                            Skin turgor normal, Capillary refill normal  Skin:                Not pale. Not Jaundiced  No rashes   Psych:             Not anxious or agitated. Neurologic:      Alert, moves all extremities, answers questions appropriately and responds to commands       Recent Results (from the past 24 hour(s))   GLUCOSE, POC    Collection Time: 05/23/22  5:45 PM   Result Value Ref Range    Glucose (POC) 121 (H) 65 - 117 mg/dL    Performed by Bernard Tabares  PCT    GLUCOSE, POC    Collection Time: 05/24/22  1:12 AM   Result Value Ref Range    Glucose (POC) 133 (H) 65 - 117 mg/dL    Performed by Gurpreet Funk   PCT    SAMPLES BEING HELD    Collection Time: 05/24/22  8:41 AM   Result Value Ref Range    SAMPLES BEING HELD 1LAV     COMMENT        Add-on orders for these samples will be processed based on acceptable specimen integrity and analyte stability, which may vary by analyte.    METABOLIC PANEL, COMPREHENSIVE    Collection Time: 05/24/22  8:41 AM   Result Value Ref Range    Sodium 137 136 - 145 mmol/L    Potassium 3.8 3.5 - 5.1 mmol/L    Chloride 108 97 - 108 mmol/L    CO2 23 21 - 32 mmol/L    Anion gap 6 5 - 15 mmol/L    Glucose 133 (H) 65 - 100 mg/dL    BUN 18 6 - 20 MG/DL    Creatinine 0.92 0.70 - 1.30 MG/DL    BUN/Creatinine ratio 20 12 - 20      GFR est AA >60 >60 ml/min/1.73m2    GFR est non-AA >60 >60 ml/min/1.73m2    Calcium 8.9 8.5 - 10.1 MG/DL    Bilirubin, total 0.7 0.2 - 1.0 MG/DL    ALT (SGPT) 44 12 - 78 U/L    AST (SGOT) 51 (H) 15 - 37 U/L    Alk.  phosphatase 86 45 - 117 U/L    Protein, total 6.3 (L) 6.4 - 8.2 g/dL    Albumin 2.3 (L) 3.5 - 5.0 g/dL    Globulin 4.0 2.0 - 4.0 g/dL    A-G Ratio 0.6 (L) 1.1 - 2.2     SARS-COV-2    Collection Time: 05/24/22 10:52 AM   Result Value Ref Range    SARS-CoV-2 by PCR Please find results under separate order     GLUCOSE, POC    Collection Time: 05/24/22 12:08 PM   Result Value Ref Range    Glucose (POC) 139 (H) 65 - 117 mg/dL    Performed by Randell rubin RN    COVID-19 RAPID TEST    Collection Time: 05/24/22  1:06 PM   Result Value Ref Range    Specimen source Nasopharyngeal      COVID-19 rapid test Not detected NOTD       CHRONIC MEDICAL DIAGNOSES:  Problem List as of 5/24/2022 Date Reviewed: 5/16/2022          Codes Class Noted - Resolved    Gastric outlet obstruction ICD-10-CM: K31.1  ICD-9-CM: 537.0  5/19/2022 - Present        Pyloric stenosis, acquired ICD-10-CM: K31.1  ICD-9-CM: 537.0  5/16/2022 - Present        Severe protein-calorie malnutrition ([facility] [address] ICD-10-CM: E43  ICD-9-CM: 262  5/5/2022 - Present        Respiratory failure ([facility] [address] ICD-10-CM: J96.90  ICD-9-CM: 518.81  5/4/2022 - Present              Greater than 45 minutes were spent with the patient on counseling and coordination of care    Signed:   Wilnette Cheadle, MD  5/24/2022  5:15 PM

## 2022-05-24 NOTE — DISCHARGE INSTRUCTIONS
Discharge Instructions       PATIENT ID: Vangie Og  MRN: [id number]   YOB: 1965    DATE OF ADMISSION: 5/19/2022  2:38 PM    DATE OF DISCHARGE: 5/24/2022    PRIMARY CARE PROVIDER: Antoine Holman MD       ATTENDING PHYSICIAN: Martha Sarah MD  DISCHARGING PROVIDER: Maria Luisa Johnston MD     To contact this individual call [phone] and ask the  to page. If unavailable ask to be transferred the Adult Hospitalist Department. CONSULTATIONS: IP CONSULT TO GENERAL SURGERY  IP CONSULT TO ONCOLOGY  IP CONSULT TO RADIOLOGY    PROCEDURES/SURGERIES: Procedure(s):  ESOPHAGOGASTRODUODENOSCOPY (EGD) with Botox injection  INJECTION  ESOPHAGOGASTRODUODENAL (EGD) BIOPSY    ADMITTING [address] [place] [address] COURSE:     This is a 80-year-old  male with a past medical history significant for esophageal cancer in 2008 status post chemoradiation and surgery, history of prostate cancer in 2015 status post prostatectomy, and hypercholesterolemia, who came to [facility]. Zully's status post EGD after he had vomiting of bilious and black material and unable to eat and drink for the last 4-5 days. EGD on 05/19/2019, study showed a large amount of melenic fluid in the stomach, all suctioned out, there was noted ulcerated mass in the pyloric area measures 3 x 4 cm.  Multiple biopsies taken, Botox injection in the pylorus channel and hospitalist service is consulted for further evaluation and admission.  The patient stated that he has poor appetite, eating little for the last 4-5 days, had multiple bilious and dark vomiting, has lost 30 pounds over 8 weeks.  No abdominal pain, fever, abnormal bowel movement, urinary complaint, lower extremity swelling, cough, fever, left-sided chest pain, or palpitations.  Last bowel movement was 4-5 days.  His vital signs were blood pressure 121/75, pulse 97, temperature 98.9, saturation of oxygen 95% on room air.     Gastric outlet obstruction, peripyloric ulcerated malignancy with history of esophageal cancer. -s/p EGD on 5/19 there was ulcerated mass in prepyloric area, measured 3 x 4 cm, very friable, multiple biopsies taken to r/o malignancy verus inflammatory mass  -Path result reported high grade malignant neoplasm, favor poorly differentiated adenocarcinoma, pending immunostains  -NPO  -s/p PICC line on 5/20  -started TPN on 5/20  -s/p NGT  -continue iv protonix, reglan 10 mg iv q 8 hrs, prn zofran,   -abdomen soft and non tender   -GI, Surgical service and Oncology on board  -Transfer to [facility]  Dehydration due to vomiting and poor oral intake  -improving  -continue normal saline   Hx of prostate cancer. -s/p surgery     Code status: Full Code  Prophylaxis: SCD    DISCHARGE DIAGNOSES / PLAN:      Gastric outlet obstruction, peripyloric ulcerated malignancy with history of esophageal cancer. -s/p EGD on 5/19 there was ulcerated mass in prepyloric area, measured 3 x 4 cm, very friable, multiple biopsies taken to r/o malignancy verus inflammatory mass  -Path result high grade malignant neoplasm, favor poorly differentiated adenocarcinoma, pending immunostains  -NPO  -s/p PICC line on 5/20  -started TPN on 5/20  -s/p NGT  -continue iv protonix, reglan 10 mg iv q 8 hrs, prn zofran,   -abdomen soft and non tender   -GI, Surgical service and Oncology on board  Dehydration due to vomiting and poor oral intake  -improving  -continue normal saline   Hx of prostate cancer. -s/p surgery       PENDING TEST RESULTS:   At the time of discharge the following test results are still pending: None    FOLLOW UP   Transfer to [facility]       ADDITIONAL CARE RECOMMENDATIONS:      DIET: NPO, TPN     TUBE FEEDING INSTRUCTIONS:  None    OXYGEN / BiPAP SETTINGS: None    ACTIVITY: Activity as tolerated    WOUND CARE: None    EQUIPMENT needed: None      DISCHARGE MEDICATIONS:   See Medication Reconciliation Form      NOTIFY YOUR PHYSICIAN FOR ANY OF THE FOLLOWING:   Fever over 101 degrees for 24 hours. Chest pain, shortness of breath, fever, chills, nausea, vomiting, diarrhea, change in mentation, falling, weakness, bleeding. Severe pain or pain not relieved by medications. Or, any other signs or symptoms that you may have questions about.     DISPOSITION:    Home With:   OT  PT  HH  RN       SNF/Inpatient Rehab/LTAC    Independent/assisted living    Hospice   x Transfer to [facility]       PATIENT CONDITION AT DISCHARGE:     Functional status    Poor     Deconditioned    x Independent      Cognition    x Lucid     Forgetful     Dementia      Catheters/lines (plus indication)    Manjarrez     PICC     PEG    x None      Code status   x  Full code     DNR      PHYSICAL EXAMINATION AT DISCHARGE:   Refer to Progress Note       CHRONIC MEDICAL DIAGNOSES:  Problem List as of 5/24/2022 Date Reviewed: 5/16/2022          Codes Class Noted - Resolved    Gastric outlet obstruction ICD-10-CM: K31.1  ICD-9-CM: 537.0  5/19/2022 - Present        Pyloric stenosis, acquired ICD-10-CM: K31.1  ICD-9-CM: 537.0  5/16/2022 - Present        Severe protein-calorie malnutrition ([facility] [address] ICD-10-CM: E43  ICD-9-CM: 262  5/5/2022 - Present        Respiratory failure ([facility] [address] ICD-10-CM: J96.90  ICD-9-CM: 518.81  5/4/2022 - Present                CDMP Checked:   Yes x     PROBLEM LIST Updated:  Yes x         Signed:   Frances Marmolejo MD  5/24/2022  9:46 PM

## 2022-05-24 NOTE — CONSULTS
HEMATOLOGY / [address] [place] [address] 1965 Age: 62 y.o. Date of service: 01/23/20   Location: Stewart Armstrong MD    CHIEF COMPLAINT: N/V    DISEASE FEATURES  Gastric outlet obstruction; irregular pyloric mucosa on EGD  Positive for adenocarcinoma    PREVIOUS TREATMENTS  . CURRENT TREATMENTS  . Disease Status: . Treatment Goal: . ACTIVE PROBLEMS   Adenocarcinoma of the pre-pyloric channel  --friable mucosa in pylorus  --personal history of prostate cancer and GE junction cancer and family hx of prostate and breast cancers  --25-30 lb weight loss  --gastric outlet obstruction      INTERIM HISTORY AND ASSESSMENT   He has made contact with a surgeon at [facility] and would like to be transferred there. Apparently they agree and will plan on a stent in the pylorus and a surgical J-tube for feeding so he can get out of the hospital and not need an NG-tube. Then after his nutritional status is fixed, proceed with surgery. That sounds like a great plan to me. I will update Dr. Donte Moya. Appreciate Dr. Delfina Diaz input. Can the care manager get the transfer gears in motion? Past Medical History:   Diagnosis Date    Esophageal cancer [facility])     2008    Prostate cancer [facility])     2014    Pyloric stenosis, acquired 5/16/2022    Radiation exposure      Past Surgical History:   Procedure Laterality Date    HX ESOPHAGECTOMY N/A 2008    removal of tumor from esophagus and stomach    HX PROSTATECTOMY N/A     2014     Medications and Allergies have been reviewed and updated in the [facility] system. REVIEW OF SYSTEMS  Except as noted in the history and assessement above, all other systems are negative. EXAMINATION   Vital signs were reviewed abnormalities discussed above.    General: Looks well  HEENT: conjunctiva clear; no jaundice    Ext: Edema: none; Tenderness: none  Neuro/Psych:  Gait: NL  Speech: NL   Affect: NL  Cognition: NL       Social History     Tobacco Use    Smoking status: Never Smoker    Smokeless tobacco: Never Used   Substance Use Topics    Alcohol use:  Yes     Alcohol/week: 1.0 - 2.0 standard drink     Types: 1 - 2 Glasses of wine per week      Family History   Problem Relation Age of Onset    Cancer Mother    Omar Payment Father         Maribell Berkowitz MD

## 2022-05-25 LAB
SARS-COV-2, XPLCVT: NOT DETECTED
SOURCE, COVRS: NORMAL

## 2022-05-25 NOTE — ADT AUTH CERT NOTES
Comments  Comment            Patient Demographics    Patient Name   Felisha Burnette   [id number] Legal Sex   Male    1965 Address   [address]  Phone   [phone] (Home)   [phone] (Work)   [phone] (Mobile) *Preferred*     Patient Demographics    Patient Name   Felisha Burnette   [id number] Legal Sex   Male    1965 Address   [address] [address] Phone   [phone] (Home)   [phone] (Work)   [phone] (Mobile) *Preferred*   CSN:   [id number]     Admit Date: Admit Time Room Bed   May 19, 2022  2:38 [place] A886 [[id number]] 85 [[id number]]       Attending Providers    Provider Pager From To   Roberto Leonard MD  22   Tung Michael MD  22     Emergency Contact(s)    Name [address], [facility] Spouse [phone]     Utilization Reviews         Gastroenterology [address] [place] [address] Care Day 5 (2022) by Bam Ratliff RN       Review Entered Review Status   2022 16:07 Completed      Criteria Review      Care Day: 5 Care Date: 2022 Level of Care: Telemetry    Guideline Day 3    Level Of Care    (X) * Activity level acceptable    2022 16:06:56 EDT by [address]    ( ) Floor to discharge    2022 15:59:32 EDT by Rhonda WANG. ( ) * Complete discharge planning    Clinical Status    (X) * Hemodynamic stability    2022 16:06:56 EDT by Bam Ratliff      /84. P 88. RR 24. SPO2 94% RA.    ( ) * Abdominal status acceptable    (X) * Pain and nausea absent or adequately managed    2022 16:06:56 EDT by Bam Ratliff      NONE    (X) * Temperature status acceptable    2022 16:06:56 EDT by Bam Ratliff      97.5    ( ) * Intestinal status acceptable    2022 16:06:56 EDT by Renetta AL MD NOTES.     ( ) * Hepatic and biliary abnormalities absent or acceptable    2022 16:06:56 EDT by Renetta Baker SEE MD NOTES. ( ) * General Discharge Criteria met    Interventions    ( ) * No inpatient interventions needed    5/24/2022 16:06:56 [facility] [address] @ 50ML/HOUR. LIPOSYN 20% 250ML IV QPM.   REGLAN 10MG IV Q8H. PROTONIX 40MG IV Q12H. TPN IV 75ML/HOUR.    NGT CARE. CONTINUOUS VS.  CENTRAL LINE CARE.    (X) * Intake acceptable    5/24/2022 16:06:56 EDT by Anne Conteh NPO. MEDS IV. TPN IV 75ML/HOUR. LIPOSYN 20% 250ML IV QPM.    * Milestone   Additional Notes   5/23/22   IP TELEMETRY      Sodium: 139   Potassium: 3.7   Chloride: 107   CO2: 25   Anion gap: 7   Glucose: 145 (H)   BUN: 16   Creatinine: 0.98   BUN/Creatinine ratio: 16   Calcium: 8.3 (L)   Phosphorus: 3.3   Magnesium: 1.6   GFR est non-AA: >60   GFR est AA: >60   Bilirubin, total: 0.6   Protein, total: 5.8 (L)   Albumin: 2.4 (L)   Globulin: 3.4   A-G Ratio: 0.7 (L)   ALT: 10 (L)   AST: 10 (L)   Alk. phosphatase: 66         GLUCOSE,FAST - POC: 129 (H)   GLUCOSE,FAST - POC: 133 (H)   GLUCOSE,FAST - POC: 121 (H)   GLUCOSE,FAST - POC: 133 (H)      MEDS/ORDERS>   ATIVAN 1MG IV X 1.   MAG SULFATE 1G IV X 1. SCDS. I+O. IM>   Assessment & Plan:       Gastric outlet obstruction, peripyloric ulcerated mass with history of esophageal cancer. -s/p EGD on 5/19 there was ulcerated mass in prepyloric area, measured 3 x 4 cm, very friable, multiple biopsies taken to r/o malignancy verus inflammatory mass   -Path result high grade malignant neoplasm, favor poorly differentiated adenocarcinoma, pending immunostains   -NPO   -s/p PICC line on 5/20   -started TPN on 5/20   -s/p NGT   -continue iv protonix, reglan 10 mg iv q 8 hrs, prn zofran,    -abdomen soft and non tender    -GI, Surgical service and Oncology on board       Dehydration due to vomiting and poor oral intake   -improving   -continue normal saline        Hx of prostate cancer.    -s/p surgery      Constitutional: No acute distress, cooperative, pleasant    ENT: Oral mucosa moist, oropharynx benign. Resp: CTA bilaterally. No wheezing/rhonchi/rales. No accessory muscle use. CV: Regular rhythm, normal rate, no murmurs, gallops, rubs    GI: Soft, non distended, non tender. normoactive bowel sounds, no hepatosplenomegaly     Musculoskeletal: No edema     Neurologic: Moves all extremities.  AAOx3, CN II-XII reviewed      GI>Given cancer diagnosis this am by Dr. Cassell Lennox. Understandably upset and ready for plan/next steps. Physically feeling much better with NG tube to suction, getting TPN. Reached out to surgery team.          ATTENDING NOTE by Shira Kilgore RN       Review Entered Review Status   2022 13:57 In Primary      Criteria Review   IM>  Assessment & Plan:     Gastric outlet obstruction, peripyloric ulcerated mass with history of esophageal cancer. -s/p EGD on  there was ulcerated mass in prepyloric area, measured 3 x 4 cm, very friable, multiple biopsies taken to r/o malignancy verus inflammatory mass  -biopsy result pending   -on clear liquid diet  -s/p PICC line on   -start TPN  -continue iv protonix, reglan 10 mg iv q 8 hrs, prn zofran,   -GI, Surgical service and Oncology on board     Dehydration due to vomiting and poor oral intake  -continue normal saline      Hx of prostate cancer. -s/p surgery     Constitutional:  No acute distress, cooperative, pleasant   ENT:     Oral mucosa moist, oropharynx benign. Resp:    CTA bilaterally. No wheezing/rhonchi/rales. No accessory muscle use. CV:       Regular rhythm, normal rate, no murmurs, gallops, rubs   GI:       Soft, non distended, non tender. normoactive bowel sounds, no hepatosplenomegaly    Musculoskeletal:         No edema    Neurologic:      Moves all extremities.   AAOx3, CN II-XII reviewed       Comments  Comment            Patient Demographics    Patient Name   [facility]   [id number] Legal Sex   Male    1965 Address   [address] [address] Phone [phone] (Home)   [phone] (Work)   [phone] (Mobile) *Preferred*     Patient Demographics    Patient Name   David Vega   [id number] Legal Sex   Male    1965 Address   [address] [address] Phone   [phone] (Home)   [phone] (Work)   [phone] (Mobile) *Preferred*   CSN:   [id number]     Admit Date: Admit Time Room Bed   May 19, 2022  2:[facility] [[id number]] 20 [[id number]]       Attending Providers    Provider Pager From To   Michael Denson MD  22   Francisco Clement MD  22     Emergency Contact(s)    Name [address] [facility], [facility] Spouse [phone]     Utilization Reviews         Gastroenterology [address] [place] [address] Day 5 (2022) by Dali Rodriguez RN       Review Entered Review Status   2022 16:07 Completed      Criteria Review      Care Day: 5 Care Date: 2022 Level of Care: Telemetry    Guideline Day 3    Level Of Care    (X) * Activity level acceptable    2022 16:06:56 EDT by [place], [address] [place] [address].    ( ) Floor to discharge    2022 15:59:32 EDT by Severiano Speller BED. ( ) * Complete discharge planning    Clinical Status    (X) * Hemodynamic stability    2022 16:06:56 EDT by Dali Rodriguez      /84. P 88. RR 24. SPO2 94% RA.    ( ) * Abdominal status acceptable    (X) * Pain and nausea absent or adequately managed    2022 16:06:56 EDT by Dali Rodriguez      NONE    (X) * Temperature status acceptable    2022 16:06:56 EDT by Dali Rodriguez      97.5    ( ) * Intestinal status acceptable    2022 16:06:56 EDT by Goyo AL MD NOTES. ( ) * Hepatic and biliary abnormalities absent or acceptable    2022 16:06:56 EDT by Leodis Ran SEE MD NOTES. ( ) * General Discharge Criteria met    Interventions    ( ) * No inpatient interventions needed    2022 16:06:56 EDT by [facility] 90, [address] @ 50ML/HOUR.   LIPOSYN 20% 250ML IV QPM.   REGLAN 10MG IV Q8H. PROTONIX 40MG IV Q12H. TPN IV 75ML/HOUR.    NGT CARE. CONTINUOUS VS.  CENTRAL LINE CARE.    (X) * Intake acceptable    5/24/2022 16:06:56 EDT by George Lamar NPO. MEDS IV. TPN IV 75ML/HOUR. LIPOSYN 20% 250ML IV QPM.    * Milestone   Additional Notes   5/23/22   IP TELEMETRY      Sodium: 139   Potassium: 3.7   Chloride: 107   CO2: 25   Anion gap: 7   Glucose: 145 (H)   BUN: 16   Creatinine: 0.98   BUN/Creatinine ratio: 16   Calcium: 8.3 (L)   Phosphorus: 3.3   Magnesium: 1.6   GFR est non-AA: >60   GFR est AA: >60   Bilirubin, total: 0.6   Protein, total: 5.8 (L)   Albumin: 2.4 (L)   Globulin: 3.4   A-G Ratio: 0.7 (L)   ALT: 10 (L)   AST: 10 (L)   Alk. phosphatase: 66         GLUCOSE,FAST - POC: 129 (H)   GLUCOSE,FAST - POC: 133 (H)   GLUCOSE,FAST - POC: 121 (H)   GLUCOSE,FAST - POC: 133 (H)      MEDS/ORDERS>   ATIVAN 1MG IV X 1.   MAG SULFATE 1G IV X 1. SCDS. I+O. IM>   Assessment & Plan:       Gastric outlet obstruction, peripyloric ulcerated mass with history of esophageal cancer. -s/p EGD on 5/19 there was ulcerated mass in prepyloric area, measured 3 x 4 cm, very friable, multiple biopsies taken to r/o malignancy verus inflammatory mass   -Path result high grade malignant neoplasm, favor poorly differentiated adenocarcinoma, pending immunostains   -NPO   -s/p PICC line on 5/20   -started TPN on 5/20   -s/p NGT   -continue iv protonix, reglan 10 mg iv q 8 hrs, prn zofran,    -abdomen soft and non tender    -GI, Surgical service and Oncology on board       Dehydration due to vomiting and poor oral intake   -improving   -continue normal saline        Hx of prostate cancer. -s/p surgery      Constitutional: No acute distress, cooperative, pleasant    ENT: Oral mucosa moist, oropharynx benign. Resp: CTA bilaterally. No wheezing/rhonchi/rales. No accessory muscle use.     CV: Regular rhythm, normal rate, no murmurs, gallops, rubs    GI: Soft, non distended, non tender. normoactive bowel sounds, no hepatosplenomegaly     Musculoskeletal: No edema     Neurologic: Moves all extremities.  AAOx3, CN II-XII reviewed      GI>Given cancer diagnosis this am by Dr. Logan Murphy. Understandably upset and ready for plan/next steps. Physically feeling much better with NG tube to suction, getting TPN. Reached out to surgery team.         5/20 ATTENDING NOTE by Jagdish Orlando RN       Review Entered Review Status   5/23/2022 13:57 In Primary      Criteria Review   IM>  Assessment & Plan:     Gastric outlet obstruction, peripyloric ulcerated mass with history of esophageal cancer. -s/p EGD on 5/19 there was ulcerated mass in prepyloric area, measured 3 x 4 cm, very friable, multiple biopsies taken to r/o malignancy verus inflammatory mass  -biopsy result pending   -on clear liquid diet  -s/p PICC line on 5/20  -start TPN  -continue iv protonix, reglan 10 mg iv q 8 hrs, prn zofran,   -GI, Surgical service and Oncology on board     Dehydration due to vomiting and poor oral intake  -continue normal saline      Hx of prostate cancer. -s/p surgery     Constitutional:  No acute distress, cooperative, pleasant   ENT:     Oral mucosa moist, oropharynx benign. Resp:    CTA bilaterally. No wheezing/rhonchi/rales. No accessory muscle use. CV:       Regular rhythm, normal rate, no murmurs, gallops, rubs   GI:       Soft, non distended, non tender. normoactive bowel sounds, no hepatosplenomegaly    Musculoskeletal:         No edema    Neurologic:      Moves all extremities.   AAOx3, CN II-XII reviewed

## 2022-10-03 ENCOUNTER — HOSPITAL ENCOUNTER (EMERGENCY)
Age: 57
Discharge: STILL ADMITTED OR ADMINISTRATIVE | End: 2022-10-04
Attending: STUDENT IN AN ORGANIZED HEALTH CARE EDUCATION/TRAINING PROGRAM
Payer: COMMERCIAL

## 2022-10-03 ENCOUNTER — APPOINTMENT (OUTPATIENT)
Dept: CT IMAGING | Age: 57
End: 2022-10-03
Attending: STUDENT IN AN ORGANIZED HEALTH CARE EDUCATION/TRAINING PROGRAM
Payer: COMMERCIAL

## 2022-10-03 ENCOUNTER — APPOINTMENT (OUTPATIENT)
Dept: GENERAL RADIOLOGY | Age: 57
End: 2022-10-03
Attending: STUDENT IN AN ORGANIZED HEALTH CARE EDUCATION/TRAINING PROGRAM
Payer: COMMERCIAL

## 2022-10-03 DIAGNOSIS — D72.829 LEUKOCYTOSIS, UNSPECIFIED TYPE: ICD-10-CM

## 2022-10-03 DIAGNOSIS — R50.9 FEVER, UNSPECIFIED FEVER CAUSE: ICD-10-CM

## 2022-10-03 DIAGNOSIS — T81.43XA POSTOPERATIVE INTRA-ABDOMINAL ABSCESS: Primary | ICD-10-CM

## 2022-10-03 LAB
ALBUMIN SERPL-MCNC: 2.5 G/DL (ref 3.5–5)
ALBUMIN/GLOB SERPL: 0.5 {RATIO} (ref 1.1–2.2)
ALP SERPL-CCNC: 195 U/L (ref 45–117)
ALT SERPL-CCNC: 38 U/L (ref 12–78)
AMORPH CRY URNS QL MICRO: ABNORMAL
ANION GAP SERPL CALC-SCNC: 8 MMOL/L (ref 5–15)
APPEARANCE UR: CLEAR
AST SERPL-CCNC: 34 U/L (ref 15–37)
BACTERIA URNS QL MICRO: NEGATIVE /HPF
BASOPHILS # BLD: 0 K/UL (ref 0–0.1)
BASOPHILS NFR BLD: 0 % (ref 0–1)
BILIRUB SERPL-MCNC: 0.4 MG/DL (ref 0.2–1)
BILIRUB UR QL: NEGATIVE
BUN SERPL-MCNC: 22 MG/DL (ref 6–20)
BUN/CREAT SERPL: 24 (ref 12–20)
CALCIUM SERPL-MCNC: 8.8 MG/DL (ref 8.5–10.1)
CHLORIDE SERPL-SCNC: 100 MMOL/L (ref 97–108)
CO2 SERPL-SCNC: 28 MMOL/L (ref 21–32)
COLOR UR: ABNORMAL
CREAT SERPL-MCNC: 0.93 MG/DL (ref 0.7–1.3)
DIFFERENTIAL METHOD BLD: ABNORMAL
EOSINOPHIL # BLD: 0 K/UL (ref 0–0.4)
EOSINOPHIL NFR BLD: 0 % (ref 0–7)
EPITH CASTS URNS QL MICRO: ABNORMAL /LPF
ERYTHROCYTE [DISTWIDTH] IN BLOOD BY AUTOMATED COUNT: 18 % (ref 11.5–14.5)
GLOBULIN SER CALC-MCNC: 4.8 G/DL (ref 2–4)
GLUCOSE SERPL-MCNC: 113 MG/DL (ref 65–100)
GLUCOSE UR STRIP.AUTO-MCNC: NEGATIVE MG/DL
HCT VFR BLD AUTO: 24.5 % (ref 36.6–50.3)
HGB BLD-MCNC: 7.7 G/DL (ref 12.1–17)
HGB UR QL STRIP: NEGATIVE
IMM GRANULOCYTES # BLD AUTO: 0.2 K/UL (ref 0–0.04)
IMM GRANULOCYTES NFR BLD AUTO: 1 % (ref 0–0.5)
KETONES UR QL STRIP.AUTO: NEGATIVE MG/DL
LACTATE SERPL-SCNC: 0.6 MMOL/L (ref 0.4–2)
LEUKOCYTE ESTERASE UR QL STRIP.AUTO: NEGATIVE
LYMPHOCYTES # BLD: 0.9 K/UL (ref 0.8–3.5)
LYMPHOCYTES NFR BLD: 5 % (ref 12–49)
MCH RBC QN AUTO: 26.6 PG (ref 26–34)
MCHC RBC AUTO-ENTMCNC: 31.4 G/DL (ref 30–36.5)
MCV RBC AUTO: 84.8 FL (ref 80–99)
MONOCYTES # BLD: 1.5 K/UL (ref 0–1)
MONOCYTES NFR BLD: 8 % (ref 5–13)
NEUTS SEG # BLD: 15.9 K/UL (ref 1.8–8)
NEUTS SEG NFR BLD: 86 % (ref 32–75)
NITRITE UR QL STRIP.AUTO: NEGATIVE
NRBC # BLD: 0 K/UL (ref 0–0.01)
NRBC BLD-RTO: 0 PER 100 WBC
PH UR STRIP: 8 [PH] (ref 5–8)
PLATELET # BLD AUTO: 775 K/UL (ref 150–400)
PMV BLD AUTO: 9 FL (ref 8.9–12.9)
POTASSIUM SERPL-SCNC: 4.7 MMOL/L (ref 3.5–5.1)
PROT SERPL-MCNC: 7.3 G/DL (ref 6.4–8.2)
PROT UR STRIP-MCNC: ABNORMAL MG/DL
RBC # BLD AUTO: 2.89 M/UL (ref 4.1–5.7)
RBC #/AREA URNS HPF: ABNORMAL /HPF (ref 0–5)
RBC MORPH BLD: ABNORMAL
RBC MORPH BLD: ABNORMAL
SODIUM SERPL-SCNC: 136 MMOL/L (ref 136–145)
SP GR UR REFRACTOMETRY: 1.01 (ref 1–1.03)
UA: UC IF INDICATED,UAUC: ABNORMAL
UROBILINOGEN UR QL STRIP.AUTO: 2 EU/DL (ref 0.2–1)
WBC # BLD AUTO: 18.5 K/UL (ref 4.1–11.1)
WBC MORPH BLD: ABNORMAL
WBC URNS QL MICRO: ABNORMAL /HPF (ref 0–4)

## 2022-10-03 PROCEDURE — 87040 BLOOD CULTURE FOR BACTERIA: CPT

## 2022-10-03 PROCEDURE — 96375 TX/PRO/DX INJ NEW DRUG ADDON: CPT

## 2022-10-03 PROCEDURE — 71275 CT ANGIOGRAPHY CHEST: CPT

## 2022-10-03 PROCEDURE — 74011250636 HC RX REV CODE- 250/636: Performed by: STUDENT IN AN ORGANIZED HEALTH CARE EDUCATION/TRAINING PROGRAM

## 2022-10-03 PROCEDURE — 85025 COMPLETE CBC W/AUTO DIFF WBC: CPT

## 2022-10-03 PROCEDURE — 74011000258 HC RX REV CODE- 258: Performed by: STUDENT IN AN ORGANIZED HEALTH CARE EDUCATION/TRAINING PROGRAM

## 2022-10-03 PROCEDURE — 74011000636 HC RX REV CODE- 636: Performed by: STUDENT IN AN ORGANIZED HEALTH CARE EDUCATION/TRAINING PROGRAM

## 2022-10-03 PROCEDURE — 99285 EMERGENCY DEPT VISIT HI MDM: CPT

## 2022-10-03 PROCEDURE — 81001 URINALYSIS AUTO W/SCOPE: CPT

## 2022-10-03 PROCEDURE — 80053 COMPREHEN METABOLIC PANEL: CPT

## 2022-10-03 PROCEDURE — 36415 COLL VENOUS BLD VENIPUNCTURE: CPT

## 2022-10-03 PROCEDURE — 96365 THER/PROPH/DIAG IV INF INIT: CPT

## 2022-10-03 PROCEDURE — 74177 CT ABD & PELVIS W/CONTRAST: CPT

## 2022-10-03 PROCEDURE — 83605 ASSAY OF LACTIC ACID: CPT

## 2022-10-03 PROCEDURE — 71046 X-RAY EXAM CHEST 2 VIEWS: CPT

## 2022-10-03 RX ORDER — GABAPENTIN 250 MG/5ML
250 SOLUTION ORAL 3 TIMES DAILY
COMMUNITY

## 2022-10-03 RX ORDER — ONDANSETRON 2 MG/ML
4 INJECTION INTRAMUSCULAR; INTRAVENOUS ONCE
Status: COMPLETED | OUTPATIENT
Start: 2022-10-03 | End: 2022-10-03

## 2022-10-03 RX ORDER — POLYETHYLENE GLYCOL 3350 17 G/17G
17 POWDER, FOR SOLUTION ORAL DAILY
COMMUNITY

## 2022-10-03 RX ORDER — OXYCODONE HYDROCHLORIDE 5 MG/1
5 TABLET ORAL
COMMUNITY

## 2022-10-03 RX ORDER — MORPHINE SULFATE 2 MG/ML
2 INJECTION, SOLUTION INTRAMUSCULAR; INTRAVENOUS ONCE
Status: COMPLETED | OUTPATIENT
Start: 2022-10-03 | End: 2022-10-03

## 2022-10-03 RX ORDER — IBUPROFEN 600 MG/1
600 TABLET ORAL
COMMUNITY

## 2022-10-03 RX ORDER — ACETAMINOPHEN 325 MG/1
650 TABLET ORAL
COMMUNITY

## 2022-10-03 RX ADMIN — IOPAMIDOL 100 ML: 755 INJECTION, SOLUTION INTRAVENOUS at 21:53

## 2022-10-03 RX ADMIN — MORPHINE SULFATE 2 MG: 2 INJECTION, SOLUTION INTRAMUSCULAR; INTRAVENOUS at 23:37

## 2022-10-03 RX ADMIN — PIPERACILLIN AND TAZOBACTAM 4.5 G: 4; .5 INJECTION, POWDER, LYOPHILIZED, FOR SOLUTION INTRAVENOUS at 23:38

## 2022-10-03 RX ADMIN — SODIUM CHLORIDE 500 ML: 9 INJECTION, SOLUTION INTRAVENOUS at 20:35

## 2022-10-03 RX ADMIN — ONDANSETRON 4 MG: 2 INJECTION INTRAMUSCULAR; INTRAVENOUS at 23:36

## 2022-10-03 NOTE — ED TRIAGE NOTES
Pt ambulates to treatment area he states that over the weekend he had low grade fevers but today it got up to 100.5 about an hour ago. He called the surgical team at [facility] and was told to go to an ER for labs and chest xray. Pt was released from [facility] 6 days ago after major surgery to remove his stomach. He has several surgical scars well approximated no redness. Pt had this surgery to remove his cancer. He has a ostomy to the left side of his chest for his esophagus to have the saliva collect. Pt has a slight cough no sputum.

## 2022-10-03 NOTE — ED PROVIDER NOTES
HPI     Date of Service:  10/3/2022    Patient:  Abilio Morales    Chief Complaint:  Fever and Shortness of Breath       HPI:  Abilio Morales is a 62 y.o.  male with a past medical history of pyloric antrum cancer status post gastrectomy and esophagectomy at [facility] on 9/22 who presents to the emergency department for evaluation of fever. Patient notes over the weekend he was having low-grade fevers up to 99, today fever was 100.5. Of note he has been alternating Tylenol and ibuprofen every 4 hours for pain control. Patient notes he has had a chronic cough since the surgery, denies any productive nature to it. He has been having increasing shortness of breath over the weekend. No nausea, vomiting or diarrhea. He has abdominal pain, which has been increasing since surgery. He is taking alternating Tylenol and ibuprofen for pain, oxycodone as well. Denies any dysuria or hematuria.     Past Medical History:   Diagnosis Date    Esophageal cancer ([facility] [address]     2008    Prostate cancer ([facility] [address]     2014    Pyloric stenosis, acquired 5/16/2022    Radiation exposure        Past Surgical History:   Procedure Laterality Date    HX ESOPHAGECTOMY N/A 01/01/2008    removal of tumor from esophagus and stomach    HX GASTRECTOMY      HX PROSTATECTOMY N/A     2014         Family History:   Problem Relation Age of Onset    Cancer Mother     Cancer Father        Social History     Socioeconomic History    Marital status:      Spouse name: Not on file    Number of children: Not on file    Years of education: Not on file    Highest education level: Not on file   Occupational History    Not on file   Tobacco Use    Smoking status: Never    Smokeless tobacco: Never   Substance and Sexual Activity    Alcohol use: Not Currently     Alcohol/week: 1.0 - 2.0 standard drink     Types: 1 - 2 Glasses of wine per week    Drug use: No    Sexual activity: Not on file   Other Topics Concern    Not on file   Social History Narrative    Not on file Social Determinants of Health     Financial Resource Strain: Not on file   Food Insecurity: Not on file   Transportation Needs: Not on file   Physical Activity: Not on file   Stress: Not on file   Social Connections: Not on file   Intimate Partner Violence: Not on file   Housing Stability: Not on file         ALLERGIES: Patient has no known allergies. Review of Systems   Constitutional:  Positive for fever. Negative for chills. HENT:  Negative for congestion and rhinorrhea. Eyes:  Negative for discharge and redness. Respiratory:  Positive for cough and shortness of breath. Cardiovascular:  Negative for chest pain and leg swelling. Gastrointestinal:  Positive for abdominal pain. Negative for diarrhea, nausea and vomiting. Genitourinary:  Negative for dysuria and hematuria. Musculoskeletal:  Negative for back pain. Skin:  Negative for rash. Neurological:  Negative for speech difficulty and headaches. Psychiatric/Behavioral:  Negative for agitation and confusion. Vitals:    10/03/22 1854   BP: 119/73   Pulse: (!) 105   Resp: 16   Temp: 99.5 °F (37.5 °C)   SpO2: 97%   Weight: 80.8 kg (178 lb 2.1 oz)   Height: 6' 2\" (1.88 m)            Physical Exam  Vitals and nursing note reviewed. Constitutional:       General: He is in acute distress. Appearance: Normal appearance. He is not ill-appearing or toxic-appearing. HENT:      Head: Normocephalic and atraumatic. Eyes:      Extraocular Movements: Extraocular movements intact. Conjunctiva/sclera: Conjunctivae normal.   Cardiovascular:      Rate and Rhythm: Tachycardia present. Pulses: Normal pulses. Heart sounds: Normal heart sounds. Pulmonary:      Effort: Pulmonary effort is normal. No tachypnea or respiratory distress. Breath sounds: Normal breath sounds. No decreased breath sounds.    Chest:          Comments: Thoracotomy incision site is well-appearing without any appreciated erythema, warmth or drainage. Abdominal:      General: Abdomen is flat. Palpations: Abdomen is soft. Tenderness: There is abdominal tenderness in the epigastric area. Comments: Well healing midline surgical incision site with Steri-Strips in place, no appreciated erythema, warmth or drainage. PEG tube in place in LUQ, well-appearing   Musculoskeletal:         General: Normal range of motion. Skin:     General: Skin is warm and dry. Capillary Refill: Capillary refill takes less than 2 seconds. Neurological:      General: No focal deficit present. Mental Status: He is alert and oriented to person, place, and time. Psychiatric:         Mood and Affect: Mood normal.         Behavior: Behavior normal.        MDM  Number of Diagnoses or Management Options  Fever, unspecified fever cause  Leukocytosis, unspecified type  Postoperative intra-abdominal abscess  Diagnosis management comments:     DECISION MAKING:  Nery Lowe is a 62 y.o. male who comes in as above. On arrival, patient is afebrile. He did take Tylenol this afternoon. He is mildly tachycardic with a heart rate of 105 bpm.  On examination he has right thoracotomy surgical site, chest surgical site and midline abdominal surgical site that are all clean, dry and intact without any surrounding erythema, warmth or drainage. He has tenderness palpation in the epigastric region without any guarding or rebound. Differential diagnosis includes, but not limited to pneumonia, PE, UTI, intra-abdominal abscess. Patient's work-up is notable for leukocytosis with a white blood cell count of 18.5, with a neutrophil predominance. He is anemic with a hemoglobin of 7.7. Thrombocytosis with platelets of 515 k. Electrolytes, kidney function and LFTs are within normal limits. Lactic acid is not elevated. UA negative for infection. Chest x-ray shows bilateral pleural effusions, otherwise no acute process.   Given his postoperative state, will obtain CT of the chest, abdomen and pelvis to rule out any infection or PE.    CTA is negative for pulmonary embolism, does note small to moderate bilateral pleural effusions without any obvious pneumonia or abscesses. CT of the abdomen and pelvis does show an upper abdominal fluid collection consistent with abscess. Given patient surgeries were at [facility], will initiate transfer to their facility for continued management. Zosyn ordered for antibiotic coverage. I subsequently discussed patient's case with the ED for ED to ED transfer and patient was accepted. He remained stable awaiting transfer. Amount and/or Complexity of Data Reviewed  Clinical lab tests: reviewed  Tests in the radiology section of CPT®: reviewed          Procedures      LABS:  Recent Results (from the past 6 hour(s))   CBC WITH AUTOMATED DIFF    Collection Time: 10/03/22  7:59 PM   Result Value Ref Range    WBC 18.5 (H) 4.1 - 11.1 K/uL    RBC 2.89 (L) 4.10 - 5.70 M/uL    HGB 7.7 (L) 12.1 - 17.0 g/dL    HCT 24.5 (L) 36.6 - 50.3 %    MCV 84.8 80.0 - 99.0 FL    MCH 26.6 26.0 - 34.0 PG    MCHC 31.4 30.0 - 36.5 g/dL    RDW 18.0 (H) 11.5 - 14.5 %    PLATELET 999 (H) 252 - 400 K/uL    MPV 9.0 8.9 - 12.9 FL    NRBC 0.0 0.0  WBC    ABSOLUTE NRBC 0.00 0.00 - 0.01 K/uL    NEUTROPHILS 86 (H) 32 - 75 %    LYMPHOCYTES 5 (L) 12 - 49 %    MONOCYTES 8 5 - 13 %    EOSINOPHILS 0 0 - 7 %    BASOPHILS 0 0 - 1 %    IMMATURE GRANULOCYTES 1 (H) 0 - 0.5 %    ABS. NEUTROPHILS 15.9 (H) 1.8 - 8.0 K/UL    ABS. LYMPHOCYTES 0.9 0.8 - 3.5 K/UL    ABS. MONOCYTES 1.5 (H) 0.0 - 1.0 K/UL    ABS. EOSINOPHILS 0.0 0.0 - 0.4 K/UL    ABS. BASOPHILS 0.0 0.0 - 0.1 K/UL    ABS. IMM.  GRANS. 0.2 (H) 0.00 - 0.04 K/UL    DF SMEAR SCANNED      RBC COMMENTS ANISOCYTOSIS  1+        RBC COMMENTS HYPOCHROMIA  PRESENT        WBC COMMENTS TOXIC GRANULATION     METABOLIC PANEL, COMPREHENSIVE    Collection Time: 10/03/22  7:59 PM   Result Value Ref Range    Sodium 136 136 - 145 mmol/L Potassium 4.7 3.5 - 5.1 mmol/L    Chloride 100 97 - 108 mmol/L    CO2 28 21 - 32 mmol/L    Anion gap 8 5 - 15 mmol/L    Glucose 113 (H) 65 - 100 mg/dL    BUN 22 (H) 6 - 20 MG/DL    Creatinine 0.93 0.70 - 1.30 MG/DL    BUN/Creatinine ratio 24 (H) 12 - 20      eGFR >60 >60 ml/min/1.73m2    Calcium 8.8 8.5 - 10.1 MG/DL    Bilirubin, total 0.4 0.2 - 1.0 MG/DL    ALT (SGPT) 38 12 - 78 U/L    AST (SGOT) 34 15 - 37 U/L    Alk. phosphatase 195 (H) 45 - 117 U/L    Protein, total 7.3 6.4 - 8.2 g/dL    Albumin 2.5 (L) 3.5 - 5.0 g/dL    Globulin 4.8 (H) 2.0 - 4.0 g/dL    A-G Ratio 0.5 (L) 1.1 - 2.2     LACTIC ACID    Collection Time: 10/03/22  7:59 PM   Result Value Ref Range    Lactic acid 0.6 0.4 - 2.0 MMOL/L   URINALYSIS W/ REFLEX CULTURE    Collection Time: 10/03/22  7:59 PM    Specimen: Urine   Result Value Ref Range    Color YELLOW/STRAW      Appearance CLEAR CLEAR      Specific gravity 1.010 1.003 - 1.030      pH (UA) 8.0 5.0 - 8.0      Protein TRACE (A) NEG mg/dL    Glucose Negative NEG mg/dL    Ketone Negative NEG mg/dL    Bilirubin Negative NEG      Blood Negative NEG      Urobilinogen 2.0 (H) 0.2 - 1.0 EU/dL    Nitrites Negative NEG      Leukocyte Esterase Negative NEG      WBC 0-4 0 - 4 /hpf    RBC 0-5 0 - 5 /hpf    Epithelial cells FEW FEW /lpf    Bacteria Negative NEG /hpf    UA:UC IF INDICATED CULTURE NOT INDICATED BY UA RESULT CNI      Amorphous Crystals FEW (A) NEG          IMAGING:  CTA CHEST W OR W WO CONT   Final Result   1. No pulmonary emboli. 2. Small/moderate-sized pleural effusions. 3. No apparent pneumonia or abscess. CT ABD PELV W CONT   Final Result   Upper abdominal abscess appearance. XR CHEST PA LAT   Final Result   Bilateral pleural effusions with overlying atelectasis.             Medications During Visit:  Medications   piperacillin-tazobactam (ZOSYN) 4.5 g in 0.9% sodium chloride (MBP/ADV) 100 mL MBP (has no administration in time range)   morphine injection 2 mg (has no administration in time range)   ondansetron [facility] injection 4 mg (has no administration in time range)   sodium chloride 0.9 % bolus infusion 500 mL (0 mL IntraVENous IV Completed 10/3/22 2138)   iopamidoL (ISOVUE-370) 76 % injection 100 mL (100 mL IntraVENous Given 10/3/22 2153)         IMPRESSION:  1. Postoperative intra-abdominal abscess    2. Fever, unspecified fever cause    3.  Leukocytosis, unspecified type        DISPOSITION:  Transferred to Another [employer], DO

## 2022-10-04 VITALS
HEART RATE: 90 BPM | SYSTOLIC BLOOD PRESSURE: 125 MMHG | WEIGHT: 178.13 LBS | TEMPERATURE: 98.6 F | RESPIRATION RATE: 16 BRPM | OXYGEN SATURATION: 96 % | HEIGHT: 74 IN | DIASTOLIC BLOOD PRESSURE: 68 MMHG | BODY MASS INDEX: 22.86 KG/M2

## 2022-10-04 NOTE — ED NOTES
[facility] at bedside and given pt report, emtala, facesheet, and summary with disc of imaging. Pt will be transported with 20g in rt AC. The patient was discharged home by Dr. Nick Mullins and Julieth Hermosillo rn in stable condition, accompanied by [facility] staff. The patient is alert and oriented, is in no respiratory distress and has vital signs within normal limits . The patient's diagnosis, condition and treatment were explained to patient. Pt will be transferred to [facility]/ED for readmission. Pt advised of plan of care will be continued at [facility].

## 2022-10-10 LAB
BACTERIA SPEC CULT: NORMAL
SERVICE CMNT-IMP: NORMAL

## 2024-06-11 ENCOUNTER — HOSPITAL ENCOUNTER (EMERGENCY)
Facility: HOSPITAL | Age: 59
Discharge: HOME | End: 2024-06-11
Attending: STUDENT IN AN ORGANIZED HEALTH CARE EDUCATION/TRAINING PROGRAM
Payer: COMMERCIAL

## 2024-06-11 ENCOUNTER — APPOINTMENT (OUTPATIENT)
Facility: HOSPITAL | Age: 59
End: 2024-06-11
Payer: COMMERCIAL

## 2024-06-11 VITALS
HEIGHT: 74 IN | WEIGHT: 145 LBS | SYSTOLIC BLOOD PRESSURE: 145 MMHG | OXYGEN SATURATION: 100 % | BODY MASS INDEX: 18.61 KG/M2 | RESPIRATION RATE: 18 BRPM | DIASTOLIC BLOOD PRESSURE: 65 MMHG | HEART RATE: 85 BPM | TEMPERATURE: 97.8 F

## 2024-06-11 DIAGNOSIS — N20.0 KIDNEY STONE: Primary | ICD-10-CM

## 2024-06-11 LAB
ALBUMIN SERPL-MCNC: 3.2 G/DL (ref 3.5–5)
ALBUMIN/GLOB SERPL: 0.9 (ref 1.1–2.2)
ALP SERPL-CCNC: 143 U/L (ref 45–117)
ALT SERPL-CCNC: 36 U/L (ref 12–78)
ANION GAP SERPL CALC-SCNC: 7 MMOL/L (ref 5–15)
APPEARANCE UR: CLEAR
AST SERPL-CCNC: 28 U/L (ref 15–37)
BACTERIA URNS QL MICRO: NEGATIVE /HPF
BASOPHILS # BLD: 0 K/UL (ref 0–0.1)
BASOPHILS NFR BLD: 0 % (ref 0–1)
BILIRUB SERPL-MCNC: 0.7 MG/DL (ref 0.2–1)
BILIRUB UR QL: NEGATIVE
BUN SERPL-MCNC: 13 MG/DL (ref 6–20)
BUN/CREAT SERPL: 11 (ref 12–20)
CALCIUM SERPL-MCNC: 8.4 MG/DL (ref 8.5–10.1)
CHLORIDE SERPL-SCNC: 105 MMOL/L (ref 97–108)
CO2 SERPL-SCNC: 29 MMOL/L (ref 21–32)
COLOR UR: ABNORMAL
COMMENT:: NORMAL
CREAT SERPL-MCNC: 1.22 MG/DL (ref 0.7–1.3)
DIFFERENTIAL METHOD BLD: ABNORMAL
EOSINOPHIL # BLD: 0.1 K/UL (ref 0–0.4)
EOSINOPHIL NFR BLD: 3 % (ref 0–7)
EPITH CASTS URNS QL MICRO: ABNORMAL /LPF
ERYTHROCYTE [DISTWIDTH] IN BLOOD BY AUTOMATED COUNT: 16.7 % (ref 11.5–14.5)
GLOBULIN SER CALC-MCNC: 3.5 G/DL (ref 2–4)
GLUCOSE SERPL-MCNC: 112 MG/DL (ref 65–100)
GLUCOSE UR STRIP.AUTO-MCNC: NEGATIVE MG/DL
HCT VFR BLD AUTO: 32.1 % (ref 36.6–50.3)
HGB BLD-MCNC: 10.7 G/DL (ref 12.1–17)
HGB UR QL STRIP: ABNORMAL
IMM GRANULOCYTES # BLD AUTO: 0 K/UL (ref 0–0.04)
IMM GRANULOCYTES NFR BLD AUTO: 1 % (ref 0–0.5)
KETONES UR QL STRIP.AUTO: NEGATIVE MG/DL
LEUKOCYTE ESTERASE UR QL STRIP.AUTO: NEGATIVE
LIPASE SERPL-CCNC: 9 U/L (ref 13–75)
LYMPHOCYTES # BLD: 0.7 K/UL (ref 0.8–3.5)
LYMPHOCYTES NFR BLD: 16 % (ref 12–49)
MCH RBC QN AUTO: 31.9 PG (ref 26–34)
MCHC RBC AUTO-ENTMCNC: 33.3 G/DL (ref 30–36.5)
MCV RBC AUTO: 95.8 FL (ref 80–99)
MONOCYTES # BLD: 0.2 K/UL (ref 0–1)
MONOCYTES NFR BLD: 5 % (ref 5–13)
NEUTS SEG # BLD: 3.6 K/UL (ref 1.8–8)
NEUTS SEG NFR BLD: 75 % (ref 32–75)
NITRITE UR QL STRIP.AUTO: NEGATIVE
NRBC # BLD: 0 K/UL (ref 0–0.01)
NRBC BLD-RTO: 0 PER 100 WBC
PH UR STRIP: 6 (ref 5–8)
PLATELET # BLD AUTO: 175 K/UL (ref 150–400)
PMV BLD AUTO: 11.4 FL (ref 8.9–12.9)
POTASSIUM SERPL-SCNC: 3.6 MMOL/L (ref 3.5–5.1)
PROT SERPL-MCNC: 6.7 G/DL (ref 6.4–8.2)
PROT UR STRIP-MCNC: NEGATIVE MG/DL
RBC # BLD AUTO: 3.35 M/UL (ref 4.1–5.7)
RBC #/AREA URNS HPF: ABNORMAL /HPF (ref 0–5)
RBC MORPH BLD: ABNORMAL
SODIUM SERPL-SCNC: 141 MMOL/L (ref 136–145)
SP GR UR REFRACTOMETRY: 1.02 (ref 1–1.03)
SPECIMEN HOLD: NORMAL
UROBILINOGEN UR QL STRIP.AUTO: 0.2 EU/DL (ref 0.2–1)
WBC # BLD AUTO: 4.6 K/UL (ref 4.1–11.1)
WBC URNS QL MICRO: ABNORMAL /HPF (ref 0–4)

## 2024-06-11 PROCEDURE — 80053 COMPREHEN METABOLIC PANEL: CPT

## 2024-06-11 PROCEDURE — 36415 COLL VENOUS BLD VENIPUNCTURE: CPT

## 2024-06-11 PROCEDURE — 6370000000 HC RX 637 (ALT 250 FOR IP): Performed by: PHYSICIAN ASSISTANT

## 2024-06-11 PROCEDURE — 81001 URINALYSIS AUTO W/SCOPE: CPT

## 2024-06-11 PROCEDURE — 99284 EMERGENCY DEPT VISIT MOD MDM: CPT

## 2024-06-11 PROCEDURE — 83690 ASSAY OF LIPASE: CPT

## 2024-06-11 PROCEDURE — 85025 COMPLETE CBC W/AUTO DIFF WBC: CPT

## 2024-06-11 PROCEDURE — 74176 CT ABD & PELVIS W/O CONTRAST: CPT

## 2024-06-11 RX ORDER — OXYCODONE HYDROCHLORIDE 5 MG/1
5 TABLET ORAL EVERY 6 HOURS PRN
Qty: 12 TABLET | Refills: 0 | Status: SHIPPED | OUTPATIENT
Start: 2024-06-11 | End: 2024-06-14

## 2024-06-11 RX ORDER — KETOROLAC TROMETHAMINE 30 MG/ML
30 INJECTION, SOLUTION INTRAMUSCULAR; INTRAVENOUS
Status: DISCONTINUED | OUTPATIENT
Start: 2024-06-11 | End: 2024-06-12 | Stop reason: HOSPADM

## 2024-06-11 RX ORDER — KETOROLAC TROMETHAMINE 30 MG/ML
30 INJECTION, SOLUTION INTRAMUSCULAR; INTRAVENOUS
Status: DISCONTINUED | OUTPATIENT
Start: 2024-06-11 | End: 2024-06-11

## 2024-06-11 RX ORDER — PHENAZOPYRIDINE HYDROCHLORIDE 100 MG/1
200 TABLET, FILM COATED ORAL
Status: COMPLETED | OUTPATIENT
Start: 2024-06-11 | End: 2024-06-11

## 2024-06-11 RX ORDER — KETOROLAC TROMETHAMINE 10 MG/1
10 TABLET, FILM COATED ORAL EVERY 6 HOURS PRN
Qty: 20 TABLET | Refills: 0 | Status: SHIPPED | OUTPATIENT
Start: 2024-06-11

## 2024-06-11 RX ADMIN — PHENAZOPYRIDINE 200 MG: 100 TABLET ORAL at 20:20

## 2024-06-11 ASSESSMENT — ENCOUNTER SYMPTOMS
RHINORRHEA: 0
NAUSEA: 1
WHEEZING: 0
SHORTNESS OF BREATH: 0
EYE DISCHARGE: 0
SORE THROAT: 0
ABDOMINAL PAIN: 0
COUGH: 0
VOMITING: 0
DIARRHEA: 0

## 2024-06-11 ASSESSMENT — PAIN DESCRIPTION - PAIN TYPE: TYPE: ACUTE PAIN

## 2024-06-11 ASSESSMENT — PAIN DESCRIPTION - LOCATION: LOCATION: FLANK

## 2024-06-11 ASSESSMENT — PAIN SCALES - GENERAL
PAINLEVEL_OUTOF10: 3
PAINLEVEL_OUTOF10: 7

## 2024-06-11 ASSESSMENT — PAIN DESCRIPTION - ORIENTATION: ORIENTATION: RIGHT

## 2024-06-11 ASSESSMENT — PAIN DESCRIPTION - DESCRIPTORS: DESCRIPTORS: SHARP

## 2024-06-11 ASSESSMENT — PAIN - FUNCTIONAL ASSESSMENT: PAIN_FUNCTIONAL_ASSESSMENT: 0-10

## 2024-06-11 NOTE — ED PROVIDER NOTES
ED SIGN OUT NOTE  Care assumed at [facility] 7:51 PM EDT    Patient was signed out to me by TANVIR Bermudez.     Patient is awaiting labs to ensure normal renal function in setting of ureterolithasis.    Creatinine resulted slightly more elevated at 1.22, not surprising in settting of current pathology.  This was reviewed with the patient as well as wife.  He is symptom free at this time.  Plan to DC with Rx for short course of pain medication.  He reports he does have Flomax, as well as antiemetics at home, and will not need either of these scripts. He understands to follow up with urology for ongoing care. Reasons to return to the ER reviewed. Pt and wife comfortable with plan for dc.      BP (!) 145/65   Pulse 85   Temp 97.8 °F (36.6 °C) (Oral)   Resp 18   Ht 1.88 m (6' 2\")   Wt 65.8 kg (145 lb)   SpO2 100%   BMI 18.62 kg/m²     Labs Reviewed   CBC WITH AUTO DIFFERENTIAL - Abnormal; Notable for the following components:       Result Value    RBC 3.35 (*)     Hemoglobin 10.7 (*)     Hematocrit 32.1 (*)     RDW 16.7 (*)     Immature Granulocytes % 1 (*)     Lymphocytes Absolute 0.7 (*)     All other components within normal limits   URINALYSIS WITH MICROSCOPIC - Abnormal; Notable for the following components:    Blood, Urine SMALL (*)     All other components within normal limits   COMPREHENSIVE METABOLIC PANEL - Abnormal; Notable for the following components:    Glucose 112 (*)     BUN/Creatinine Ratio 11 (*)     Calcium 8.4 (*)     Alk Phosphatase 143 (*)     Albumin 3.2 (*)     Albumin/Globulin Ratio 0.9 (*)     All other components within normal limits   LIPASE - Abnormal; Notable for the following components:    Lipase 9 (*)     All other components within normal limits   EXTRA TUBES HOLD     CT ABDOMEN PELVIS WO CONTRAST Additional Contrast? None   Final Result   Punctate stone right UVJ resulting in mild right hydronephrosis. Bilateral   nonobstructing renal stones. Postoperative

## 2024-06-11 NOTE — ED TRIAGE NOTES
Reports right flank pain with slight nausea onset this morning increasing in intensity this evening hx of stomach ca reports a biliary drainage tube on right side dressing noted clean jamel and intact reports 2 oxycodone in the past hour

## 2024-06-11 NOTE — ED PROVIDER NOTES
[facility] EMERGENCY DEPT  EMERGENCY DEPARTMENT ENCOUNTER      Pt Name: Teto Prince  MRN: [id number]  Birthdate 1965  Date of evaluation: 6/11/2024  Provider: TANVIR Bermudez    CHIEF COMPLAINT       Chief Complaint   Patient presents with    Flank Pain     right         HISTORY OF PRESENT ILLNESS   (Location/Symptom, Timing/Onset, Context/Setting, Quality, Duration, Modifying Factors, Severity)  Note limiting factors.   This is a 60 yo male with medical hx remarkable for esophageal cancer, stomach cancer and kidney stones presenting ambulatory to the ED with complaint of rt flank pain beginning around 9AM this morning waxing and waning in intensity, acutely worse about 1 hr prior to arrival with associated nausea without vomiting. He took 10 mg of roxicodone which he has for cancer pain and has improved pain. Pain is described as similar to prior kidney stones. He has an internal biliary drain located in the rt flank area. No external drainage, warmth or redness has been noticed at the site. He has noticed some irritation with urination, frequency and hesitancy today.No fever, chills, ear pain, sore throat, cough, chest pain, SOB, diarrhea, or constipation            Review of External Medical Records:     Nursing Notes were reviewed.    REVIEW OF SYSTEMS    (2-9 systems for level 4, 10 or more for level 5)     Review of Systems   Constitutional:  Negative for fever.   HENT:  Negative for congestion, ear pain, rhinorrhea and sore throat.    Eyes:  Negative for discharge.   Respiratory:  Negative for cough, shortness of breath and wheezing.    Cardiovascular:  Negative for chest pain.   Gastrointestinal:  Positive for nausea. Negative for abdominal pain, diarrhea and vomiting.   Genitourinary:  Positive for difficulty urinating, dysuria and flank pain (rt flank). Negative for frequency and urgency.   Skin:  Negative for rash.   Neurological:  Negative for dizziness, weakness and numbness.

## 2024-08-11 ENCOUNTER — APPOINTMENT (OUTPATIENT)
Facility: HOSPITAL | Age: 59
DRG: 868 | End: 2024-08-11
Payer: COMMERCIAL

## 2024-08-11 ENCOUNTER — HOSPITAL ENCOUNTER (INPATIENT)
Facility: HOSPITAL | Age: 59
LOS: 3 days | Discharge: HOME | DRG: 868 | End: 2024-08-14
Attending: EMERGENCY MEDICINE | Admitting: HOSPITALIST
Payer: COMMERCIAL

## 2024-08-11 DIAGNOSIS — Z85.028 HISTORY OF GASTRIC CANCER: ICD-10-CM

## 2024-08-11 DIAGNOSIS — R50.9 ACUTE FEBRILE ILLNESS: Primary | ICD-10-CM

## 2024-08-11 LAB
ALBUMIN SERPL-MCNC: 2.2 G/DL (ref 3.5–5)
ALBUMIN/GLOB SERPL: 0.7 (ref 1.1–2.2)
ALP SERPL-CCNC: 130 U/L (ref 45–117)
ALT SERPL-CCNC: 45 U/L (ref 12–78)
ANION GAP SERPL CALC-SCNC: 7 MMOL/L (ref 5–15)
APPEARANCE UR: ABNORMAL
AST SERPL-CCNC: 20 U/L (ref 15–37)
BACTERIA URNS QL MICRO: ABNORMAL /HPF
BASOPHILS # BLD: 0 K/UL (ref 0–0.1)
BASOPHILS NFR BLD: 0 % (ref 0–1)
BILIRUB SERPL-MCNC: 0.8 MG/DL (ref 0.2–1)
BILIRUB UR QL: NEGATIVE
BUN SERPL-MCNC: 17 MG/DL (ref 6–20)
BUN/CREAT SERPL: 15 (ref 12–20)
CALCIUM SERPL-MCNC: 7.9 MG/DL (ref 8.5–10.1)
CAOX CRY URNS QL MICRO: ABNORMAL
CHLORIDE SERPL-SCNC: 103 MMOL/L (ref 97–108)
CO2 SERPL-SCNC: 29 MMOL/L (ref 21–32)
COLOR UR: ABNORMAL
CREAT SERPL-MCNC: 1.17 MG/DL (ref 0.7–1.3)
CRP SERPL-MCNC: 8.31 MG/DL (ref 0–0.3)
DIFFERENTIAL METHOD BLD: ABNORMAL
EOSINOPHIL # BLD: 0.1 K/UL (ref 0–0.4)
EOSINOPHIL NFR BLD: 3 % (ref 0–7)
EPITH CASTS URNS QL MICRO: ABNORMAL /LPF
ERYTHROCYTE [DISTWIDTH] IN BLOOD BY AUTOMATED COUNT: 13.9 % (ref 11.5–14.5)
FLUAV RNA SPEC QL NAA+PROBE: NOT DETECTED
FLUBV RNA SPEC QL NAA+PROBE: NOT DETECTED
GLOBULIN SER CALC-MCNC: 3.2 G/DL (ref 2–4)
GLUCOSE SERPL-MCNC: 148 MG/DL (ref 65–100)
GLUCOSE UR STRIP.AUTO-MCNC: NEGATIVE MG/DL
HCT VFR BLD AUTO: 27.4 % (ref 36.6–50.3)
HGB BLD-MCNC: 9 G/DL (ref 12.1–17)
HGB UR QL STRIP: ABNORMAL
IMM GRANULOCYTES # BLD AUTO: 0 K/UL
IMM GRANULOCYTES NFR BLD AUTO: 0 %
KETONES UR QL STRIP.AUTO: ABNORMAL MG/DL
LACTATE BLD-SCNC: 1.7 MMOL/L (ref 0.4–2)
LEUKOCYTE ESTERASE UR QL STRIP.AUTO: NEGATIVE
LYMPHOCYTES # BLD: 0.7 K/UL (ref 0.8–3.5)
LYMPHOCYTES NFR BLD: 23 % (ref 12–49)
MAGNESIUM SERPL-MCNC: 1.5 MG/DL (ref 1.6–2.4)
MCH RBC QN AUTO: 30.8 PG (ref 26–34)
MCHC RBC AUTO-ENTMCNC: 32.8 G/DL (ref 30–36.5)
MCV RBC AUTO: 93.8 FL (ref 80–99)
MONOCYTES # BLD: 0.1 K/UL (ref 0–1)
MONOCYTES NFR BLD: 4 % (ref 5–13)
MUCOUS THREADS URNS QL MICRO: ABNORMAL /LPF
NEUTS BAND NFR BLD MANUAL: 6 % (ref 0–6)
NEUTS SEG # BLD: 2.1 K/UL (ref 1.8–8)
NEUTS SEG NFR BLD: 64 % (ref 32–75)
NITRITE UR QL STRIP.AUTO: NEGATIVE
NRBC # BLD: 0 K/UL (ref 0–0.01)
NRBC BLD-RTO: 0 PER 100 WBC
PH UR STRIP: 6 (ref 5–8)
PLATELET # BLD AUTO: 119 K/UL (ref 150–400)
PMV BLD AUTO: 9.3 FL (ref 8.9–12.9)
POTASSIUM SERPL-SCNC: 3 MMOL/L (ref 3.5–5.1)
PROCALCITONIN SERPL-MCNC: 14.85 NG/ML
PROT SERPL-MCNC: 5.4 G/DL (ref 6.4–8.2)
PROT UR STRIP-MCNC: ABNORMAL MG/DL
RBC # BLD AUTO: 2.92 M/UL (ref 4.1–5.7)
RBC #/AREA URNS HPF: ABNORMAL /HPF (ref 0–5)
RBC MORPH BLD: ABNORMAL
SARS-COV-2 RNA RESP QL NAA+PROBE: NOT DETECTED
SODIUM SERPL-SCNC: 139 MMOL/L (ref 136–145)
SP GR UR REFRACTOMETRY: 1.03 (ref 1–1.03)
TROPONIN I SERPL HS-MCNC: 6 NG/L (ref 0–76)
UROBILINOGEN UR QL STRIP.AUTO: 0.2 EU/DL (ref 0.2–1)
WBC # BLD AUTO: 3 K/UL (ref 4.1–11.1)
WBC URNS QL MICRO: ABNORMAL /HPF (ref 0–4)

## 2024-08-11 PROCEDURE — 83735 ASSAY OF MAGNESIUM: CPT

## 2024-08-11 PROCEDURE — 6360000002 HC RX W HCPCS: Performed by: EMERGENCY MEDICINE

## 2024-08-11 PROCEDURE — 2580000003 HC RX 258: Performed by: EMERGENCY MEDICINE

## 2024-08-11 PROCEDURE — 81001 URINALYSIS AUTO W/SCOPE: CPT

## 2024-08-11 PROCEDURE — 80053 COMPREHEN METABOLIC PANEL: CPT

## 2024-08-11 PROCEDURE — 36415 COLL VENOUS BLD VENIPUNCTURE: CPT

## 2024-08-11 PROCEDURE — 87636 SARSCOV2 & INF A&B AMP PRB: CPT

## 2024-08-11 PROCEDURE — 96365 THER/PROPH/DIAG IV INF INIT: CPT

## 2024-08-11 PROCEDURE — 99285 EMERGENCY DEPT VISIT HI MDM: CPT

## 2024-08-11 PROCEDURE — 1100000000 HC RM PRIVATE

## 2024-08-11 PROCEDURE — 86140 C-REACTIVE PROTEIN: CPT

## 2024-08-11 PROCEDURE — 93005 ELECTROCARDIOGRAM TRACING: CPT | Performed by: EMERGENCY MEDICINE

## 2024-08-11 PROCEDURE — 84484 ASSAY OF TROPONIN QUANT: CPT

## 2024-08-11 PROCEDURE — 85025 COMPLETE CBC W/AUTO DIFF WBC: CPT

## 2024-08-11 PROCEDURE — 71045 X-RAY EXAM CHEST 1 VIEW: CPT

## 2024-08-11 PROCEDURE — 96361 HYDRATE IV INFUSION ADD-ON: CPT

## 2024-08-11 PROCEDURE — 83605 ASSAY OF LACTIC ACID: CPT

## 2024-08-11 PROCEDURE — 84145 PROCALCITONIN (PCT): CPT

## 2024-08-11 PROCEDURE — 87040 BLOOD CULTURE FOR BACTERIA: CPT

## 2024-08-11 PROCEDURE — 87086 URINE CULTURE/COLONY COUNT: CPT

## 2024-08-11 RX ORDER — ONDANSETRON HYDROCHLORIDE 8 MG/1
8 TABLET, FILM COATED ORAL EVERY 8 HOURS PRN
COMMUNITY

## 2024-08-11 RX ORDER — SODIUM CHLORIDE 9 MG/ML
INJECTION, SOLUTION INTRAVENOUS PRN
Status: DISCONTINUED | OUTPATIENT
Start: 2024-08-11 | End: 2024-08-14 | Stop reason: HOSPADM

## 2024-08-11 RX ORDER — ENOXAPARIN SODIUM 100 MG/ML
40 INJECTION SUBCUTANEOUS DAILY
Status: DISCONTINUED | OUTPATIENT
Start: 2024-08-12 | End: 2024-08-14 | Stop reason: HOSPADM

## 2024-08-11 RX ORDER — ONDANSETRON 4 MG/1
4 TABLET, ORALLY DISINTEGRATING ORAL EVERY 8 HOURS PRN
Status: DISCONTINUED | OUTPATIENT
Start: 2024-08-11 | End: 2024-08-14 | Stop reason: HOSPADM

## 2024-08-11 RX ORDER — SODIUM CHLORIDE 9 MG/ML
INJECTION, SOLUTION INTRAVENOUS CONTINUOUS
Status: DISCONTINUED | OUTPATIENT
Start: 2024-08-11 | End: 2024-08-12

## 2024-08-11 RX ORDER — MAGNESIUM SULFATE IN WATER 40 MG/ML
2000 INJECTION, SOLUTION INTRAVENOUS PRN
Status: DISCONTINUED | OUTPATIENT
Start: 2024-08-11 | End: 2024-08-13

## 2024-08-11 RX ORDER — IBUPROFEN 200 MG
200 TABLET ORAL EVERY 6 HOURS PRN
COMMUNITY

## 2024-08-11 RX ORDER — SODIUM CHLORIDE 0.9 % (FLUSH) 0.9 %
5-40 SYRINGE (ML) INJECTION EVERY 12 HOURS SCHEDULED
Status: DISCONTINUED | OUTPATIENT
Start: 2024-08-11 | End: 2024-08-14 | Stop reason: HOSPADM

## 2024-08-11 RX ORDER — POLYETHYLENE GLYCOL 3350 17 G/17G
17 POWDER, FOR SOLUTION ORAL DAILY PRN
Status: DISCONTINUED | OUTPATIENT
Start: 2024-08-11 | End: 2024-08-14 | Stop reason: HOSPADM

## 2024-08-11 RX ORDER — POTASSIUM CHLORIDE 750 MG/1
40 TABLET, FILM COATED, EXTENDED RELEASE ORAL PRN
Status: DISCONTINUED | OUTPATIENT
Start: 2024-08-11 | End: 2024-08-13

## 2024-08-11 RX ORDER — ONDANSETRON 2 MG/ML
4 INJECTION INTRAMUSCULAR; INTRAVENOUS EVERY 6 HOURS PRN
Status: DISCONTINUED | OUTPATIENT
Start: 2024-08-11 | End: 2024-08-14 | Stop reason: HOSPADM

## 2024-08-11 RX ORDER — ACETAMINOPHEN 650 MG/1
650 SUPPOSITORY RECTAL EVERY 6 HOURS PRN
Status: DISCONTINUED | OUTPATIENT
Start: 2024-08-11 | End: 2024-08-14 | Stop reason: HOSPADM

## 2024-08-11 RX ORDER — 0.9 % SODIUM CHLORIDE 0.9 %
1000 INTRAVENOUS SOLUTION INTRAVENOUS ONCE
Status: COMPLETED | OUTPATIENT
Start: 2024-08-11 | End: 2024-08-11

## 2024-08-11 RX ORDER — ACETAMINOPHEN 325 MG/1
650 TABLET ORAL EVERY 6 HOURS PRN
Status: DISCONTINUED | OUTPATIENT
Start: 2024-08-11 | End: 2024-08-14 | Stop reason: HOSPADM

## 2024-08-11 RX ORDER — POTASSIUM CHLORIDE 7.45 MG/ML
10 INJECTION INTRAVENOUS PRN
Status: DISCONTINUED | OUTPATIENT
Start: 2024-08-11 | End: 2024-08-13

## 2024-08-11 RX ORDER — 0.9 % SODIUM CHLORIDE 0.9 %
30 INTRAVENOUS SOLUTION INTRAVENOUS ONCE
Status: DISCONTINUED | OUTPATIENT
Start: 2024-08-11 | End: 2024-08-14

## 2024-08-11 RX ORDER — SODIUM CHLORIDE 0.9 % (FLUSH) 0.9 %
5-40 SYRINGE (ML) INJECTION PRN
Status: DISCONTINUED | OUTPATIENT
Start: 2024-08-11 | End: 2024-08-14 | Stop reason: HOSPADM

## 2024-08-11 RX ADMIN — SODIUM CHLORIDE 1000 ML: 9 INJECTION, SOLUTION INTRAVENOUS at 20:03

## 2024-08-11 RX ADMIN — SODIUM CHLORIDE 1500 MG: 9 INJECTION, SOLUTION INTRAVENOUS at 23:37

## 2024-08-11 RX ADMIN — CEFEPIME 2000 MG: 2 INJECTION, POWDER, FOR SOLUTION INTRAVENOUS at 22:58

## 2024-08-11 ASSESSMENT — PAIN SCALES - GENERAL: PAINLEVEL_OUTOF10: 0

## 2024-08-11 NOTE — ED TRIAGE NOTES
Pt presents to ED with c/o fever and slight cough since yesterday. Pt is being treated for gastric cancer and had chemo treatment on 8/6/2024. Pt is followed at [facility] Pt told by oncology to come to ED.

## 2024-08-12 PROBLEM — R19.7 DIARRHEA: Status: ACTIVE | Noted: 2024-08-12

## 2024-08-12 PROBLEM — Z98.890 HISTORY OF ESOPHAGECTOMY: Status: ACTIVE | Noted: 2024-08-12

## 2024-08-12 PROBLEM — Z90.49 HISTORY OF ESOPHAGECTOMY: Status: ACTIVE | Noted: 2024-08-12

## 2024-08-12 PROBLEM — C16.9 ADENOCARCINOMA OF STOMACH (HCC): Status: ACTIVE | Noted: 2024-08-12

## 2024-08-12 PROBLEM — E87.6 HYPOKALEMIA: Status: ACTIVE | Noted: 2024-08-12

## 2024-08-12 LAB
ANION GAP SERPL CALC-SCNC: 9 MMOL/L (ref 5–15)
BUN SERPL-MCNC: 17 MG/DL (ref 6–20)
BUN/CREAT SERPL: 23 (ref 12–20)
CALCIUM SERPL-MCNC: 7.5 MG/DL (ref 8.5–10.1)
CHLORIDE SERPL-SCNC: 109 MMOL/L (ref 97–108)
CO2 SERPL-SCNC: 20 MMOL/L (ref 21–32)
CREAT SERPL-MCNC: 0.75 MG/DL (ref 0.7–1.3)
EKG ATRIAL RATE: 78 BPM
EKG DIAGNOSIS: NORMAL
EKG P AXIS: 47 DEGREES
EKG P-R INTERVAL: 146 MS
EKG Q-T INTERVAL: 394 MS
EKG QRS DURATION: 90 MS
EKG QTC CALCULATION (BAZETT): 449 MS
EKG R AXIS: 54 DEGREES
EKG T AXIS: 36 DEGREES
EKG VENTRICULAR RATE: 78 BPM
GLUCOSE SERPL-MCNC: 142 MG/DL (ref 65–100)
POTASSIUM SERPL-SCNC: 4.2 MMOL/L (ref 3.5–5.1)
SODIUM SERPL-SCNC: 138 MMOL/L (ref 136–145)

## 2024-08-12 PROCEDURE — 94761 N-INVAS EAR/PLS OXIMETRY MLT: CPT

## 2024-08-12 PROCEDURE — 93010 ELECTROCARDIOGRAM REPORT: CPT | Performed by: STUDENT IN AN ORGANIZED HEALTH CARE EDUCATION/TRAINING PROGRAM

## 2024-08-12 PROCEDURE — 6360000002 HC RX W HCPCS: Performed by: INTERNAL MEDICINE

## 2024-08-12 PROCEDURE — 80048 BASIC METABOLIC PNL TOTAL CA: CPT

## 2024-08-12 PROCEDURE — 87506 IADNA-DNA/RNA PROBE TQ 6-11: CPT

## 2024-08-12 PROCEDURE — 2580000003 HC RX 258: Performed by: HOSPITALIST

## 2024-08-12 PROCEDURE — 6370000000 HC RX 637 (ALT 250 FOR IP): Performed by: HOSPITALIST

## 2024-08-12 PROCEDURE — 36415 COLL VENOUS BLD VENIPUNCTURE: CPT

## 2024-08-12 PROCEDURE — 6360000002 HC RX W HCPCS: Performed by: HOSPITALIST

## 2024-08-12 PROCEDURE — 1100000000 HC RM PRIVATE

## 2024-08-12 RX ORDER — OXYCODONE HYDROCHLORIDE 5 MG/1
5 TABLET ORAL EVERY 6 HOURS PRN
Status: DISCONTINUED | OUTPATIENT
Start: 2024-08-12 | End: 2024-08-14 | Stop reason: HOSPADM

## 2024-08-12 RX ORDER — MULTIVITAMIN WITH IRON
1 TABLET ORAL DAILY
Status: DISCONTINUED | OUTPATIENT
Start: 2024-08-12 | End: 2024-08-14 | Stop reason: HOSPADM

## 2024-08-12 RX ORDER — METRONIDAZOLE 500 MG/100ML
500 INJECTION, SOLUTION INTRAVENOUS EVERY 8 HOURS
Status: DISCONTINUED | OUTPATIENT
Start: 2024-08-12 | End: 2024-08-13

## 2024-08-12 RX ORDER — SODIUM CHLORIDE AND POTASSIUM CHLORIDE 300; 900 MG/100ML; MG/100ML
INJECTION, SOLUTION INTRAVENOUS CONTINUOUS
Status: DISCONTINUED | OUTPATIENT
Start: 2024-08-12 | End: 2024-08-13

## 2024-08-12 RX ADMIN — POTASSIUM CHLORIDE 10 MEQ: 7.46 INJECTION, SOLUTION INTRAVENOUS at 04:37

## 2024-08-12 RX ADMIN — SODIUM CHLORIDE: 9 INJECTION, SOLUTION INTRAVENOUS at 01:58

## 2024-08-12 RX ADMIN — CEFEPIME 2000 MG: 2 INJECTION, POWDER, FOR SOLUTION INTRAVENOUS at 07:15

## 2024-08-12 RX ADMIN — POTASSIUM CHLORIDE 10 MEQ: 7.46 INJECTION, SOLUTION INTRAVENOUS at 02:52

## 2024-08-12 RX ADMIN — ACETAMINOPHEN 650 MG: 325 TABLET ORAL at 05:27

## 2024-08-12 RX ADMIN — METRONIDAZOLE 500 MG: 500 INJECTION, SOLUTION INTRAVENOUS at 14:05

## 2024-08-12 RX ADMIN — VANCOMYCIN HYDROCHLORIDE 750 MG: 750 INJECTION, POWDER, LYOPHILIZED, FOR SOLUTION INTRAVENOUS at 15:47

## 2024-08-12 RX ADMIN — CHOLECALCIFEROL TAB 125 MCG (5000 UNIT) 5000 UNITS: 125 TAB at 09:05

## 2024-08-12 RX ADMIN — POTASSIUM CHLORIDE AND SODIUM CHLORIDE: 900; 300 INJECTION, SOLUTION INTRAVENOUS at 11:13

## 2024-08-12 RX ADMIN — THERA TABS 1 TABLET: TAB at 09:05

## 2024-08-12 RX ADMIN — POTASSIUM CHLORIDE 10 MEQ: 7.46 INJECTION, SOLUTION INTRAVENOUS at 09:04

## 2024-08-12 RX ADMIN — OXYCODONE 5 MG: 5 TABLET ORAL at 11:31

## 2024-08-12 RX ADMIN — MAGNESIUM SULFATE HEPTAHYDRATE 2000 MG: 40 INJECTION, SOLUTION INTRAVENOUS at 06:50

## 2024-08-12 RX ADMIN — POTASSIUM CHLORIDE 10 MEQ: 7.46 INJECTION, SOLUTION INTRAVENOUS at 03:46

## 2024-08-12 RX ADMIN — METRONIDAZOLE 500 MG: 500 INJECTION, SOLUTION INTRAVENOUS at 19:52

## 2024-08-12 RX ADMIN — POTASSIUM CHLORIDE 10 MEQ: 7.46 INJECTION, SOLUTION INTRAVENOUS at 11:16

## 2024-08-12 RX ADMIN — VANCOMYCIN HYDROCHLORIDE 750 MG: 750 INJECTION, POWDER, LYOPHILIZED, FOR SOLUTION INTRAVENOUS at 23:06

## 2024-08-12 RX ADMIN — ENOXAPARIN SODIUM 40 MG: 100 INJECTION SUBCUTANEOUS at 09:05

## 2024-08-12 RX ADMIN — OXYCODONE 5 MG: 5 TABLET ORAL at 17:17

## 2024-08-12 RX ADMIN — CEFEPIME 2000 MG: 2 INJECTION, POWDER, FOR SOLUTION INTRAVENOUS at 17:09

## 2024-08-12 RX ADMIN — POTASSIUM CHLORIDE 10 MEQ: 7.46 INJECTION, SOLUTION INTRAVENOUS at 06:03

## 2024-08-12 ASSESSMENT — PAIN DESCRIPTION - LOCATION
LOCATION: HEAD;NECK
LOCATION: GENERALIZED
LOCATION: ABDOMEN

## 2024-08-12 ASSESSMENT — PAIN DESCRIPTION - DESCRIPTORS
DESCRIPTORS: ACHING

## 2024-08-12 ASSESSMENT — PAIN SCALES - GENERAL
PAINLEVEL_OUTOF10: 8
PAINLEVEL_OUTOF10: 0
PAINLEVEL_OUTOF10: 0
PAINLEVEL_OUTOF10: 8
PAINLEVEL_OUTOF10: 0
PAINLEVEL_OUTOF10: 3
PAINLEVEL_OUTOF10: 0
PAINLEVEL_OUTOF10: 0
PAINLEVEL_OUTOF10: 2

## 2024-08-12 ASSESSMENT — PAIN DESCRIPTION - ORIENTATION: ORIENTATION: ANTERIOR

## 2024-08-12 ASSESSMENT — PAIN SCALES - WONG BAKER: WONGBAKER_NUMERICALRESPONSE: NO HURT

## 2024-08-12 NOTE — H&P
Hospitalist Admission Note    NAME: Teto Prince   :  1965   MRN:  [id number]     Date/Time:  2024 11:24 PM    Patient PCP: Albert Wagoner MD    Please note that this dictation was completed with [employer], the computer voice recognition software.  Quite often unanticipated grammatical, syntax, homophones, and other interpretive errors are inadvertently transcribed by the computer software.  Please disregard these errors.  Please excuse any errors that have escaped final proofreading  ________________________________________________________________________    Given the patient's current clinical presentation, I have a high level of concern for decompensation if discharged from the emergency department.  Complex decision making was performed, which includes reviewing the patient's available past medical records, laboratory results, and x-ray films.       My assessment of this patient's clinical condition and my plan of care is as follows.    Assessment / Plan:    Acute febrile illness, .5 at home in setting of neutropenia, thrombocytopenia, after chemotx   Bacteriuria, POA  Recent CT chest/abd/pelvis 24 at [facility] notable for increased ascites and diffuse colon wall thickening  --empiric abx with vancomycin, cefepime, flagyl.  Procalcitonin and CRP high  --check stool for C. Diff, enteric pathogen  --FU urine and blood cultures    Metastatic gastric cancer, POA s/p last round of chemo  at [facility], s/p gastrectomy  Hx esophageal cancer  Hx prostate cancer  --FU at [facility].  CT with ascites, stable lung nodule, colon thickening  --chronic diarrhea unchanged, continue oxycodone prn  --get B12 injection monthly  --continue MVI, vitamin D    HypoK  Hypomag  --repletion as per protocol        Medical Decision Making:   I have personally reviewed the radiographs, laboratory data in Epic and decisions and statements above are based partially on this personal interpretation.    Drug monitoring:       Code  N/A    Impression  Punctate stone right UVJ resulting in mild right hydronephrosis. Bilateral  nonobstructing renal stones. Postoperative changes as described above without  evidence of bowel obstruction. Area of decreased attenuation in the left hepatic  lobe may represent focal fat. External biliary stent in position.    Electronically signed by RENARD CHAPA        Xray Result (most recent):  XR CHEST PORTABLE 08/11/2024    Narrative  EXAM:  XR CHEST PORTABLE    Clinical history: fever/sirs  INDICATION:   fever/sirs  COMPARISON: 10/3/2022    FINDINGS:  AP portable upright view of the chest demonstrates a stable  cardiopericardial  silhouette.small left pleural effusion. There is no focal consolidation.There is  no pneumothorax. Right IJ port catheter with catheter tip in appropriate  position. The left clavicle is absent, postsurgical.    Impression  Small left pleural effusion.    No additional acute intrathoracic process is identified.        Electronically signed by JOSS BURNS      ________________________________________________________________________  Signed: Nelida Newsome MD        Procedures: see electronic medical records for all procedures/Xrays/labs and details which were not copied into this note but were reviewed prior to creation of Plan.

## 2024-08-12 NOTE — PLAN OF CARE
Problem: Pain  Goal: Verbalizes/displays adequate comfort level or baseline comfort level  Outcome: Progressing     Problem: Safety - Adult  Goal: Free from fall injury  Outcome: Progressing     Problem: Gastrointestinal - Adult  Goal: Maintains or returns to baseline bowel function  Outcome: Progressing  Goal: Maintains adequate nutritional intake  Outcome: Progressing

## 2024-08-12 NOTE — ED NOTES
TRANSFER - OUT REPORT:    Verbal report given to Peter on [facility]  being transferred to [facility] for routine progression of patient care       Report consisted of patient's Situation, Background, Assessment and   Recommendations(SBAR).     Information from the following report(s) ED Encounter Summary, ED SBAR, and MAR was reviewed with the receiving nurse.    [place] Fall Assessment:    Presents to emergency department  because of falls (Syncope, seizure, or loss of consciousness): No  Age > 70: No  Altered Mental Status, Intoxication with alcohol or substance confusion (Disorientation, impaired judgment, poor safety awaremess, or inability to follow instructions): No  Impaired Mobility: Ambulates or transfers with assistive devices or assistance; Unable to ambulate or transer.: No  Nursing Judgement: No          Lines:   Peripheral IV 08/11/24 Right Antecubital (Active)       Peripheral IV 08/11/24 Left;Anterior Forearm (Active)   Site Assessment Clean, dry & intact 08/11/24 2306   Line Status Flushed 08/11/24 2306   Line Care Connections checked and tightened 08/11/24 2306   Phlebitis Assessment No symptoms 08/11/24 2306   Infiltration Assessment 0 08/11/24 2306   Alcohol Cap Used No 08/11/24 2306   Dressing Status Clean, dry & intact 08/11/24 2306   Dressing Type Transparent 08/11/24 2306        Opportunity for questions and clarification was provided.      Patient transported with:  Vancomycin Infusion

## 2024-08-12 NOTE — ED PROVIDER NOTES
MEDICATIONS:  New Prescriptions    No medications on file         (Please note that portions of this note were completed with a voice recognition program.  Efforts were made to edit the dictations but occasionally words are mis-transcribed.)    Teto Kendrick MD (electronically signed)  Emergency Attending Physician / Physician Assistant / Nurse Practitioner            Teto Kendrick MD  08/12/24 0215

## 2024-08-12 NOTE — ED NOTES
Verbal report given to Jair RN(name) on [facility] being transferred to [facility] when admission orders are in for routine progression of patient care    Report consisted of patient's Situation, Background, Assessment and Recommendations (SBAR)    Information from the following report(s)  Nurse Handoff Report, MAR, and Recent Results was reviewed with the receiving nurse.    Opportunity for questions and clarification was provided.    Patient transported with:  Monitor and Tech    Last Filed Values:  Vitals:    08/11/24 2300   BP: 128/83   Pulse:    Resp:    Temp:    SpO2: 98%          WBC   Date Value Ref Range Status   08/11/2024 3.0 (L) 4.1 - 11.1 K/uL Final   06/11/2024 4.6 4.1 - 11.1 K/uL Final   10/03/2022 18.5 (H) 4.1 - 11.1 K/uL Final        Blood Cultures Drawn:  Yes    Initial Lactic Acid (LA):  Time 1943,  Result 1.7    Repeat LA:  Time Due n/a, Done & Result n/a    Fluid Restriction:  Total needed 2000 mL, Status completed, amount 2000 ml    All Antibiotics Started:  Yes, Dose Due Cefipime infusing, needs Vancomycin    VS x 2 post-fluid resuscitation:   Yes    Vasopressor Infusion:  No Other n/a    Provider Reassessment needed and notified:  Yes, Due prior to transfer     Additional Interventions/Comments:  Dr. Kendrick did not want to call code sepsis since all the labs were done except 2nd blood culture initially. 2 peripheral IV placed,  2L NS infused, lactic normal and EKG complete. ABX was ordered and started, VSS.

## 2024-08-12 NOTE — PROGRESS NOTES
[facility] Dosing Services: Antimicrobial Stewardship Daily Doc  Consult for antibiotic dosing of Vancomycin by Dr. Newsome  Indication: Sepsis  Day of Therapy: 1/7    Ht Readings from Last 1 Encounters:   08/11/24 1.829 m (6')        Wt Readings from Last 1 Encounters:   08/11/24 63.5 kg (140 lb)      Vancomycin therapy:  Loading dose: Vancomycin 1500 mg x1 dose now/given  Maintenance dose: Vancomycin 750 mg IV every 12 hours   Dose calculated to approximate a           a. Target AUC/KARISSA of 400-600          b. Trough of 15-20 mcg/mL   Last level: na  Plan:   -SCR=1.17, WBC=3.0, Afebrile, ProCal 14.85  -will start 750 mg q12h, predicted AUC= 523, Trough=15.5  -BMP/CBC x1    Dose administration notes: Doses given appropriately as scheduled    Non-Kinetic Antimicrobial Dosing Regimen:   Current Regimen:  cefepime 2 g q8h EI  Recommendation: continue same  Dose administration notes: Doses given appropriately as scheduled    Other Antimicrobial   (not dosed by pharmacist) na   Cultures 08/11 Blood x2  08/11 Urine     Serum Creatinine Lab Results   Component Value Date/Time    CREATININE 1.17 08/11/2024 07:48 PM          Creatinine Clearance Estimated Creatinine Clearance: 61 mL/min (based on SCr of 1.17 mg/dL).     Temp Temp: 98.3 °F (36.8 °C) (Oral)       WBC Lab Results   Component Value Date/Time    WBC 3.0 08/11/2024 07:48 PM          Procalcitonin Lab Results   Component Value Date/Time    PROCAL 14.85 08/11/2024 07:48 PM      For Antifungals, Metronidazole and Nafcillin: Lab Results   Component Value Date/Time    ALT 45 08/11/2024 07:48 PM    AST 20 08/11/2024 07:48 PM        Pharmacist: Joana Steve, KarenD, MS, BCPS      [phone]

## 2024-08-12 NOTE — ED NOTES
TRANSFER - OUT REPORT:    Verbal report given to [facility] on [facility]  being transferred to [facility] for routine progression of patient care       Report consisted of patient's Situation, Background, Assessment and   Recommendations(SBAR).     Information from the following report(s) ED SBAR was reviewed with the receiving nurse.    Gilma Fall Assessment:    Presents to emergency department  because of falls (Syncope, seizure, or loss of consciousness): No  Age > 70: No  Altered Mental Status, Intoxication with alcohol or substance confusion (Disorientation, impaired judgment, poor safety awaremess, or inability to follow instructions): No  Impaired Mobility: Ambulates or transfers with assistive devices or assistance; Unable to ambulate or transer.: No  Nursing Judgement: No          Lines:   Peripheral IV 08/11/24 Right Antecubital (Active)       Peripheral IV 08/11/24 Left;Anterior Forearm (Active)   Site Assessment Clean, dry & intact 08/11/24 2306   Line Status Flushed 08/11/24 2306   Line Care Connections checked and tightened 08/11/24 2306   Phlebitis Assessment No symptoms 08/11/24 2306   Infiltration Assessment 0 08/11/24 2306   Alcohol Cap Used No 08/11/24 2306   Dressing Status Clean, dry & intact 08/11/24 2306   Dressing Type Transparent 08/11/24 2306        Opportunity for questions and clarification was provided.      Patient transported with:  Vancomycin

## 2024-08-12 NOTE — ED NOTES
Due to patient chemo therapy status and bands from blood work, Dr. Kendrick wanted another set and blood culture and ABX started. Patient did rescreen positive for sepsis however.

## 2024-08-12 NOTE — PROGRESS NOTES
[facility]  [address] [place] [address]  [phone]      Hospitalist  Progress Note      NAME:       Teto Prince   :        1965  MRM:        [id number]    Date of service: 2024      Subjective: Patient seen and examined by me. Patient admitted with febrile illness in an immunocompromised patient. He says he has loose stools associated with abdominal cramping. No vomiting. No cough. No urinary symptoms.      Objective:    Vital Signs:    BP (!) 124/90   Pulse 79   Temp 98.2 °F (36.8 °C) (Oral)   Resp 17   Ht 1.829 m (6')   Wt 63.5 kg (140 lb)   SpO2 96%   BMI 18.99 kg/m²        Intake/Output Summary (Last 24 hours) at 2024 1049  Last data filed at 2024 0837  Gross per 24 hour   Intake 2580 ml   Output 300 ml   Net 2280 ml        Current inpatient medications reviewed:  Current Facility-Administered Medications   Medication Dose Route Frequency    oxyCODONE (ROXICODONE) immediate release tablet 5 mg  5 mg Oral Q6H PRN    multivitamin 1 tablet  1 tablet Oral Daily    vitamin D3 (CHOLECALCIFEROL) tablet 5,000 Units  5,000 Units Oral Daily    [START ON 2024] Vancomycin random level  with AM labs   Other Once    0.9% NaCl with KCl 40 mEq infusion   IntraVENous Continuous    sodium chloride 0.9 % bolus 1,905 mL  30 mL/kg IntraVENous Once    sodium chloride flush 0.9 % injection 5-40 mL  5-40 mL IntraVENous 2 times per day    sodium chloride flush 0.9 % injection 5-40 mL  5-40 mL IntraVENous PRN    0.9 % sodium chloride infusion   IntraVENous PRN    potassium chloride (KLOR-CON) extended release tablet 40 mEq  40 mEq Oral PRN    Or    potassium bicarb-citric acid (EFFER-K) effervescent tablet 40 mEq  40 mEq Oral PRN    Or    potassium chloride 10 mEq/100 mL IVPB (Peripheral Line)  10 mEq IntraVENous PRN    magnesium sulfate 2000 mg in 50 mL IVPB premix  2,000 mg IntraVENous

## 2024-08-13 LAB
BACTERIA SPEC CULT: NORMAL
CREAT SERPL-MCNC: 0.67 MG/DL (ref 0.7–1.3)
SERVICE CMNT-IMP: NORMAL
VANCOMYCIN SERPL-MCNC: 14.9 UG/ML

## 2024-08-13 PROCEDURE — 6360000002 HC RX W HCPCS: Performed by: INTERNAL MEDICINE

## 2024-08-13 PROCEDURE — 6370000000 HC RX 637 (ALT 250 FOR IP): Performed by: INTERNAL MEDICINE

## 2024-08-13 PROCEDURE — 1100000000 HC RM PRIVATE

## 2024-08-13 PROCEDURE — 2580000003 HC RX 258: Performed by: HOSPITALIST

## 2024-08-13 PROCEDURE — 36415 COLL VENOUS BLD VENIPUNCTURE: CPT

## 2024-08-13 PROCEDURE — 80202 ASSAY OF VANCOMYCIN: CPT

## 2024-08-13 PROCEDURE — 94761 N-INVAS EAR/PLS OXIMETRY MLT: CPT

## 2024-08-13 PROCEDURE — 6360000002 HC RX W HCPCS: Performed by: HOSPITALIST

## 2024-08-13 PROCEDURE — 82565 ASSAY OF CREATININE: CPT

## 2024-08-13 PROCEDURE — 2580000003 HC RX 258: Performed by: INTERNAL MEDICINE

## 2024-08-13 PROCEDURE — 6370000000 HC RX 637 (ALT 250 FOR IP): Performed by: HOSPITALIST

## 2024-08-13 RX ORDER — LEVOFLOXACIN 750 MG/1
750 TABLET, FILM COATED ORAL DAILY
Status: DISCONTINUED | OUTPATIENT
Start: 2024-08-14 | End: 2024-08-14 | Stop reason: HOSPADM

## 2024-08-13 RX ORDER — METRONIDAZOLE 500 MG/1
500 TABLET ORAL EVERY 8 HOURS SCHEDULED
Status: DISCONTINUED | OUTPATIENT
Start: 2024-08-13 | End: 2024-08-14 | Stop reason: HOSPADM

## 2024-08-13 RX ADMIN — CEFEPIME 2000 MG: 2 INJECTION, POWDER, FOR SOLUTION INTRAVENOUS at 00:59

## 2024-08-13 RX ADMIN — VANCOMYCIN HYDROCHLORIDE 1000 MG: 1 INJECTION, POWDER, LYOPHILIZED, FOR SOLUTION INTRAVENOUS at 10:43

## 2024-08-13 RX ADMIN — CEFEPIME 2000 MG: 2 INJECTION, POWDER, FOR SOLUTION INTRAVENOUS at 10:38

## 2024-08-13 RX ADMIN — SODIUM CHLORIDE, PRESERVATIVE FREE 10 ML: 5 INJECTION INTRAVENOUS at 10:37

## 2024-08-13 RX ADMIN — METRONIDAZOLE 500 MG: 500 TABLET ORAL at 22:56

## 2024-08-13 RX ADMIN — CHOLECALCIFEROL TAB 125 MCG (5000 UNIT) 5000 UNITS: 125 TAB at 10:33

## 2024-08-13 RX ADMIN — CEFEPIME 2000 MG: 2 INJECTION, POWDER, FOR SOLUTION INTRAVENOUS at 17:25

## 2024-08-13 RX ADMIN — OXYCODONE 5 MG: 5 TABLET ORAL at 17:21

## 2024-08-13 RX ADMIN — POTASSIUM CHLORIDE AND SODIUM CHLORIDE: 900; 300 INJECTION, SOLUTION INTRAVENOUS at 06:09

## 2024-08-13 RX ADMIN — THERA TABS 1 TABLET: TAB at 10:33

## 2024-08-13 RX ADMIN — METRONIDAZOLE 500 MG: 500 INJECTION, SOLUTION INTRAVENOUS at 11:58

## 2024-08-13 RX ADMIN — SODIUM CHLORIDE, PRESERVATIVE FREE 10 ML: 5 INJECTION INTRAVENOUS at 19:55

## 2024-08-13 RX ADMIN — METRONIDAZOLE 500 MG: 500 INJECTION, SOLUTION INTRAVENOUS at 02:54

## 2024-08-13 RX ADMIN — SODIUM CHLORIDE: 9 INJECTION, SOLUTION INTRAVENOUS at 11:57

## 2024-08-13 ASSESSMENT — PAIN SCALES - GENERAL
PAINLEVEL_OUTOF10: 6
PAINLEVEL_OUTOF10: 0
PAINLEVEL_OUTOF10: 0
PAINLEVEL_OUTOF10: 2

## 2024-08-13 ASSESSMENT — PAIN DESCRIPTION - DESCRIPTORS: DESCRIPTORS: ACHING

## 2024-08-13 ASSESSMENT — PAIN DESCRIPTION - LOCATION: LOCATION: ABDOMEN

## 2024-08-13 NOTE — PROGRESS NOTES
[facility] Dosing Services: Antimicrobial Stewardship Daily Doc  Consult for antibiotic dosing of vancomycin by Dr. Newsome  Indication: neutropenic fever  Day of Therapy: 3    Ht Readings from Last 1 Encounters:   08/11/24 1.829 m (6')        Wt Readings from Last 1 Encounters:   08/12/24 63.4 kg (139 lb 12.8 oz)      Vancomycin therapy:  Loading dose: Vancomycin 1500 mg x1 dose given 8/11 @ 2337  Maintenance dose: Vancomycin 750 mg IV every 12 hours --> Increase to 1000mg IV every 12 hours   Dose calculated to approximate a           a. Target AUC/KARISSA of 400-600       Last level:   Assess/Plan: Afebrile; WBC = 3; Renal stable; Blood & Urine cultures still pending; AM Vanc level (14.9) resulted in subtherapeutic AUC (356); Increase Vancomycin to 1g IV every 12 hours for predicted AUC ~ 463; Random level ordered 8/14 with AM labs. Scr ordered for tomorrow AM.     Dose administration notes: Doses given appropriately as scheduled      Other Antimicrobial   (not dosed by pharmacist) Cefepime 2g IV every 8 hours   Cultures 8/11 Urine: pending   8/11 blood: NGTD <24h (prelim)  8/11 flu: negative  8/11 covid: negative    Serum Creatinine Lab Results   Component Value Date/Time    CREATININE 0.67 08/13/2024 06:10 AM          Creatinine Clearance Estimated Creatinine Clearance: 106 mL/min (A) (based on SCr of 0.67 mg/dL (L)).     Temp Temp: 98.4 °F (36.9 °C) (Oral)       WBC Lab Results   Component Value Date/Time    WBC 3.0 08/11/2024 07:48 PM          Procalcitonin Lab Results   Component Value Date/Time    PROCAL 14.85 08/11/2024 07:48 PM      For Antifungals, Metronidazole and Nafcillin: Lab Results   Component Value Date/Time    ALT 45 08/11/2024 07:48 PM    AST 20 08/11/2024 07:48 PM        Pharmacist: Zulay Medley, Pharm.D.   [phone]

## 2024-08-13 NOTE — CONSULTS
Comprehensive Nutrition Assessment    Type and Reason for Visit: Initial, Consult    Nutrition Recommendations/Plan:   Continue Regular diet  Pt doesn't tolerate Ensure/Protein shakes (dumping like symptoms)       Malnutrition Assessment:  Malnutrition Status:  At risk for malnutrition (Comment) (08/13/24 1057)    Context:  Chronic Illness     Findings of the 6 clinical characteristics of malnutrition:  Energy Intake:  No significant decrease in energy intake  Weight Loss:  No significant weight loss     Body Fat Loss:  Mild body fat loss Buccal region, Orbital   Muscle Mass Loss:  Mild muscle mass loss Clavicles (pectoralis & deltoids), Temples (temporalis)  Fluid Accumulation:  No significant fluid accumulation     Strength:  Not Performed       Nutrition Assessment:    Past medical hx:       Diagnosis Date    Esophageal cancer (HCC)     2008    Gastric cancer (HCC) 2022    gastrectomy, [facility]    Prostate cancer (HCC)     2014    Pyloric stenosis, acquired 5/16/2022    Radiation exposure      Consult received. Pt reports having gained ~10 lbs over the last couple months after starting Creon. Pt with significant wt loss in 2022 after total gastrectomy. Pt also had partial esophagectomy in 2008. Pt reports eating small frequent meals, trying to focus on high protein. Discussed additional ways to get additional calories along with protein. Pt denies chew/swallow issues, No known food allergies, no nausea. Last BM 2 days ago. C.Diff rule out, now negative. Continue small frequent meals, spouse brought in snacks for pt to eat outside of meal times. Pt reports not having good luck with protein shakes \"they go right through me.\" Pt cannot usually eat a full \"normal\" meal 2/2 gastrectomy, so regularly eats small meals/snacks.         Current Nutrition Therapies:  ADULT DIET; Regular  Meal Intake:   Patient Vitals for the past 168 hrs:   PO Meals Eaten (%)   08/12/24 1500 26 - 50%   08/12/24 1200 76 - 100%   08/12/24 0837

## 2024-08-13 NOTE — PROGRESS NOTES
[facility]  [address] [place] [address]  [phone]      Hospitalist  Progress Note      NAME:       Teto Prince   :        1965  MRM:        [id number]    Date of service: 2024      Subjective: Patient seen and examined by me. Patient admitted with febrile illness in an immunocompromised patient. He says he feels better today. No diarrhea. Afebrile. Weak. No cough. No urinary symptoms.      Objective:    Vital Signs:    /82   Pulse 81   Temp 98.4 °F (36.9 °C) (Oral)   Resp 18   Ht 1.829 m (6' 0.01\")   Wt 63.4 kg (139 lb 12.8 oz)   SpO2 96%   BMI 18.96 kg/m²        Intake/Output Summary (Last 24 hours) at 2024 1111  Last data filed at 2024 1500  Gross per 24 hour   Intake 480 ml   Output --   Net 480 ml        Current inpatient medications reviewed:  Current Facility-Administered Medications   Medication Dose Route Frequency    vancomycin (VANCOCIN) 1,000 mg in sodium chloride 0.9 % 250 mL IVPB ([id number])  1,000 mg IntraVENous Q12H    [START ON 2024] Vancomycin - Please obtain RANDOM level with am labs (); Thanks, Pharmacy   Other Once    oxyCODONE (ROXICODONE) immediate release tablet 5 mg  5 mg Oral Q6H PRN    multivitamin 1 tablet  1 tablet Oral Daily    vitamin D3 (CHOLECALCIFEROL) tablet 5,000 Units  5,000 Units Oral Daily    Vancomycin random level  with AM labs   Other Once    0.9% NaCl with KCl 40 mEq infusion   IntraVENous Continuous    metroNIDAZOLE (FLAGYL) 500 mg in 0.9% NaCl 100 mL IVPB premix  500 mg IntraVENous Q8H    sodium chloride 0.9 % bolus 1,905 mL  30 mL/kg IntraVENous Once    sodium chloride flush 0.9 % injection 5-40 mL  5-40 mL IntraVENous 2 times per day    sodium chloride flush 0.9 % injection 5-40 mL  5-40 mL IntraVENous PRN    0.9 % sodium chloride infusion   IntraVENous PRN    potassium chloride (KLOR-CON) extended release tablet

## 2024-08-14 VITALS
OXYGEN SATURATION: 96 % | WEIGHT: 139.8 LBS | HEIGHT: 72 IN | HEART RATE: 78 BPM | RESPIRATION RATE: 16 BRPM | TEMPERATURE: 98.6 F | BODY MASS INDEX: 18.93 KG/M2 | SYSTOLIC BLOOD PRESSURE: 109 MMHG | DIASTOLIC BLOOD PRESSURE: 73 MMHG

## 2024-08-14 LAB
ANION GAP SERPL CALC-SCNC: 6 MMOL/L (ref 5–15)
BASOPHILS # BLD: 0 K/UL (ref 0–0.1)
BASOPHILS NFR BLD: 0 % (ref 0–1)
BUN SERPL-MCNC: 10 MG/DL (ref 6–20)
BUN/CREAT SERPL: 14 (ref 12–20)
C COLI+JEJUNI TUF STL QL NAA+PROBE: NEGATIVE
CALCIUM SERPL-MCNC: 8 MG/DL (ref 8.5–10.1)
CHLORIDE SERPL-SCNC: 108 MMOL/L (ref 97–108)
CO2 SERPL-SCNC: 24 MMOL/L (ref 21–32)
CREAT SERPL-MCNC: 0.73 MG/DL (ref 0.7–1.3)
CRP SERPL-MCNC: 5.43 MG/DL (ref 0–0.3)
DIFFERENTIAL METHOD BLD: ABNORMAL
EC STX1+STX2 GENES STL QL NAA+PROBE: NEGATIVE
EOSINOPHIL # BLD: 0.3 K/UL (ref 0–0.4)
EOSINOPHIL NFR BLD: 10 % (ref 0–7)
ERYTHROCYTE [DISTWIDTH] IN BLOOD BY AUTOMATED COUNT: 13.9 % (ref 11.5–14.5)
ETEC ELTA+ESTB GENES STL QL NAA+PROBE: NEGATIVE
GLUCOSE SERPL-MCNC: 95 MG/DL (ref 65–100)
HCT VFR BLD AUTO: 27.3 % (ref 36.6–50.3)
HGB BLD-MCNC: 9.1 G/DL (ref 12.1–17)
IMM GRANULOCYTES # BLD AUTO: 0 K/UL (ref 0–0.04)
IMM GRANULOCYTES NFR BLD AUTO: 0 % (ref 0–0.5)
LYMPHOCYTES # BLD: 0.7 K/UL (ref 0.8–3.5)
LYMPHOCYTES NFR BLD: 21 % (ref 12–49)
MAGNESIUM SERPL-MCNC: 1.9 MG/DL (ref 1.6–2.4)
MCH RBC QN AUTO: 31.3 PG (ref 26–34)
MCHC RBC AUTO-ENTMCNC: 33.3 G/DL (ref 30–36.5)
MCV RBC AUTO: 93.8 FL (ref 80–99)
MONOCYTES # BLD: 0.5 K/UL (ref 0–1)
MONOCYTES NFR BLD: 16 % (ref 5–13)
NEUTS SEG # BLD: 1.7 K/UL (ref 1.8–8)
NEUTS SEG NFR BLD: 53 % (ref 32–75)
NRBC # BLD: 0 K/UL (ref 0–0.01)
NRBC BLD-RTO: 0 PER 100 WBC
P SHIGELLOIDES DNA STL QL NAA+PROBE: NEGATIVE
PHOSPHATE SERPL-MCNC: 2.2 MG/DL (ref 2.6–4.7)
PLATELET # BLD AUTO: 126 K/UL (ref 150–400)
PMV BLD AUTO: 9.8 FL (ref 8.9–12.9)
POTASSIUM SERPL-SCNC: 3.9 MMOL/L (ref 3.5–5.1)
PROCALCITONIN SERPL-MCNC: 3.55 NG/ML
RBC # BLD AUTO: 2.91 M/UL (ref 4.1–5.7)
RBC MORPH BLD: ABNORMAL
SALMONELLA SP SPAO STL QL NAA+PROBE: NEGATIVE
SHIGELLA SP+EIEC IPAH STL QL NAA+PROBE: NEGATIVE
SODIUM SERPL-SCNC: 138 MMOL/L (ref 136–145)
V CHOL+PARA+VUL DNA STL QL NAA+NON-PROBE: NEGATIVE
WBC # BLD AUTO: 3.2 K/UL (ref 4.1–11.1)
Y ENTEROCOL DNA STL QL NAA+NON-PROBE: NEGATIVE

## 2024-08-14 PROCEDURE — 83735 ASSAY OF MAGNESIUM: CPT

## 2024-08-14 PROCEDURE — 6370000000 HC RX 637 (ALT 250 FOR IP): Performed by: INTERNAL MEDICINE

## 2024-08-14 PROCEDURE — 85025 COMPLETE CBC W/AUTO DIFF WBC: CPT

## 2024-08-14 PROCEDURE — 84100 ASSAY OF PHOSPHORUS: CPT

## 2024-08-14 PROCEDURE — 80048 BASIC METABOLIC PNL TOTAL CA: CPT

## 2024-08-14 PROCEDURE — 36415 COLL VENOUS BLD VENIPUNCTURE: CPT

## 2024-08-14 PROCEDURE — 84145 PROCALCITONIN (PCT): CPT

## 2024-08-14 PROCEDURE — 94761 N-INVAS EAR/PLS OXIMETRY MLT: CPT

## 2024-08-14 PROCEDURE — 86140 C-REACTIVE PROTEIN: CPT

## 2024-08-14 RX ORDER — LACTOBACILLUS RHAMNOSUS GG 10B CELL
1 CAPSULE ORAL
Qty: 30 CAPSULE | Refills: 1 | Status: SHIPPED | OUTPATIENT
Start: 2024-08-14

## 2024-08-14 RX ORDER — METRONIDAZOLE 500 MG/1
500 TABLET ORAL EVERY 12 HOURS
Qty: 10 TABLET | Refills: 0 | Status: SHIPPED | OUTPATIENT
Start: 2024-08-14 | End: 2024-08-19

## 2024-08-14 RX ORDER — LACTOBACILLUS RHAMNOSUS GG 10B CELL
1 CAPSULE ORAL
Status: DISCONTINUED | OUTPATIENT
Start: 2024-08-14 | End: 2024-08-14 | Stop reason: HOSPADM

## 2024-08-14 RX ORDER — LEVOFLOXACIN 750 MG/1
750 TABLET, FILM COATED ORAL DAILY
Qty: 5 TABLET | Refills: 0 | Status: SHIPPED | OUTPATIENT
Start: 2024-08-14 | End: 2024-08-19

## 2024-08-14 RX ADMIN — METRONIDAZOLE 500 MG: 500 TABLET ORAL at 05:34

## 2024-08-14 ASSESSMENT — PAIN SCALES - GENERAL: PAINLEVEL_OUTOF10: 0

## 2024-08-14 NOTE — DISCHARGE SUMMARY
follows commands appropriately  Psych:  Good insight, oriented to person, place and time, alert    Disposition: home  Discharge Condition: Stable    Patient Instructions:   Current Discharge Medication List        START taking these medications    Details   lactobacillus (CULTURELLE) capsule Take 1 capsule by mouth daily (with breakfast)  Qty: 30 capsule, Refills: 1      levoFLOXacin (LEVAQUIN) 750 MG tablet Take 1 tablet by mouth daily for 5 doses  Qty: 5 tablet, Refills: 0      metroNIDAZOLE (FLAGYL) 500 MG tablet Take 1 tablet by mouth in the morning and 1 tablet in the evening. Do all this for 10 doses.  Qty: 10 tablet, Refills: 0           CONTINUE these medications which have NOT CHANGED    Details   ibuprofen (ADVIL;MOTRIN) 200 MG tablet Take 1 tablet by mouth every 6 hours as needed for Pain      ondansetron (ZOFRAN) 8 MG tablet Take 1 tablet by mouth every 8 hours as needed for Nausea or Vomiting      acetaminophen (TYLENOL) 325 MG tablet Take by mouth every 4 hours as needed      oxyCODONE (ROXICODONE) 5 MG immediate release tablet Take by mouth every 4 hours as needed.      polyethylene glycol (GLYCOLAX) 17 GM/SCOOP powder Take by mouth daily           STOP taking these medications       ketorolac (TORADOL) 10 MG tablet Comments:   Reason for Stopping:         gabapentin (NEURONTIN) 250 MG/5ML solution Comments:   Reason for Stopping:             Activity: activity as tolerated  Diet: regular diet  Wound Care: none needed    Follow-up with  Albert Wagoner MD  [address] [facility] [address] [phone]    Schedule an appointment as soon as possible for a visit in 1 week(s)        Follow-up tests/labs none    Signed:  David Estrada MD  8/14/2024  9:01 AM  **I personally spent 35 min on discharge**

## 2024-08-14 NOTE — DISCHARGE INSTRUCTIONS
HOSPITALIST DISCHARGE INSTRUCTIONS          NAME: Teto Prince   :  1965   MRN:  [id number]     Date/Time:  2024 9:01 AM    ADMIT DATE: 2024     DISCHARGE DATE: 2024     ADMITTING DIAGNOSIS:  Fevers and diarrhea, likely related to recent chemotherapy.  All cultures negative    DISCHARGE DIAGNOSIS:  same    MEDICATIONS:  See after visit summary       It is important that you take the medication exactly as they are prescribed.   Keep your medication in the bottles provided by the pharmacist and keep a list of the medication names, dosages, and times to be taken in your wallet.   Do not take other medications without consulting your doctor     Pain Management: per above medications    What to do at Home    Recommended diet:  regular diet    Recommended activity: activity as tolerated    1) Return to the hospital if you feel worse    2) If you experience any of the following symptoms then please call your primary care physician or return to the emergency room if you cannot get hold of your doctor:  Fever, chills, nausea, vomiting, diarrhea, change in mentation, falling, bleeding, shortness of breath, chest pain, severe headache, severe abdominal pain,     3) Follow up with your doctors as directed    Follow Up:  Albert Wagoner MD  [address] [facility] [address] [phone]    Schedule an appointment as soon as possible for a visit in 1 week(s)    .      Information obtained by :  I understand that if any problems occur once I am at home I am to contact my physician.    I understand and acknowledge receipt of the instructions indicated above.                                                                                                                                           Physician's or R.N.'s Signature                                                                  Date/Time

## 2024-08-15 NOTE — PROGRESS NOTES
Physician Progress Note      PATIENT:               EMILIANO HUTSON  CSN #:                  [id number]  :                       1965  ADMIT DATE:       2024 6:50 PM  DISCH DATE:        2024 10:27 AM  RESPONDING  PROVIDER #:        David WOODS Do, MD          QUERY TEXT:    Good Morning    This patient admitted on 2024- for Acute febrile illness. + Diarrhea    If possible, in your medical opinion, if known, can you please document in   progress notes and discharge summary further if you are evaluating and/or   treating any of the following:    The medical record reflects the following:  Risk Factors: Adencarcinoma of the Stomach, on active chemo treatment.   Immunocompromised  Clinical Indicators: Reported fever @ home. Temp upon arrival 99.1, WBC on   admission 4.6--and then on 2025 WBC 3.0- HR 70-80s--+ Diarrhea, Enteric   panel negative., BC NGTD, Urine cx no growth  Treatment: Blood and urine cx, IV Cefepime, IV Vanco and IV metronidazole   given for his diarrhea.    Thank you  Vanesa Almonte, BSN,RN, CPHQ, CCDS, SMART  Options provided:  -- Bacterial infection unknown source/etiology  -- Viral infection of unknown source/etiology  -- Fever of unknown etiology with empiric antimicrobial therapy  -- Other - I will add my own diagnosis  -- Disagree - Not applicable / Not valid  -- Disagree - Clinically unable to determine / Unknown  -- Refer to Clinical Documentation Reviewer    PROVIDER RESPONSE TEXT:    This patient has a bacterial infection with unknown source/etiology.    Query created by: Vanesa Almonte on 2024 9:02 AM      QUERY TEXT:    Good Morning    This patient admited on 2024- present for Febrile illness.    The patient has known Adenocarcinoma of the Stomach.  RD was consulted on 2024- and has noted \"At risk for malnutrition\"  Attending has noted  a diagnosis of  \"Severe protein Calorie Malnutrition\"    In order to support the diagnosis of Severe Protein Calorie  Malnutrition,   please include additional clinical indicators in your documentation.  Or please document if the diagnosis of Severe Protein Calorie Malnutrition has   been ruled out after further study.    The medical record reflects the following:  Risk Factors: Adenocarcinoma of the stomach, On Active chemo treatment Hx of   partial esophagectomy  Clinical Indicators: Per RD consulted, \"At risk for Malnutrition\", has   reported to have gained 10 lbs in the last couple of months after staring on   Creon. PT does have mild body fat loss , mild muscle mass loss clavicles and   temples. No sign. Decreed in energy intake, No significant weight loss.  BMI   18.96  Treatment: RD consulted, weight, Multivitamins, Record intake. Output, labs,   Pt does not tolerate Ensure protein shakes due to Dumping like symptoms    Thank you  Vanesa Almonte, BSn, RN, CPHQ, CCDS, SMART  Options provided:  -- Severe Protein Calorie Malnutrition is  present as evidenced by, Please   document evidence.  -- Severe Protein Calorie Malnutrition was ruled out  -- Other - I will add my own diagnosis  -- Disagree - Not applicable / Not valid  -- Disagree - Clinically unable to determine / Unknown  -- Refer to Clinical Documentation Reviewer    PROVIDER RESPONSE TEXT:    Severe Protein Calorie Malnutrition was ruled out after study.    Query created by: Vanesa Almonte on 8/14/2024 9:09 AM      Electronically signed by:  David WOODS Do, MD 8/14/2024 11:42 PM

## 2024-08-17 LAB
BACTERIA SPEC CULT: NORMAL
BACTERIA SPEC CULT: NORMAL
SERVICE CMNT-IMP: NORMAL
SERVICE CMNT-IMP: NORMAL

## 2024-12-03 ENCOUNTER — APPOINTMENT (OUTPATIENT)
Facility: HOSPITAL | Age: 59
DRG: 871 | End: 2024-12-03
Payer: COMMERCIAL

## 2024-12-03 ENCOUNTER — HOSPITAL ENCOUNTER (INPATIENT)
Facility: HOSPITAL | Age: 59
LOS: 2 days | Discharge: ANOTHER HOSPITAL | DRG: 871 | End: 2024-12-06
Attending: STUDENT IN AN ORGANIZED HEALTH CARE EDUCATION/TRAINING PROGRAM | Admitting: INTERNAL MEDICINE
Payer: COMMERCIAL

## 2024-12-03 DIAGNOSIS — J18.9 PNEUMONIA OF BOTH LOWER LOBES DUE TO INFECTIOUS ORGANISM: Primary | ICD-10-CM

## 2024-12-03 DIAGNOSIS — D70.1 CHEMOTHERAPY-INDUCED NEUTROPENIA (HCC): ICD-10-CM

## 2024-12-03 DIAGNOSIS — K52.9 COLITIS: ICD-10-CM

## 2024-12-03 DIAGNOSIS — T45.1X5A CHEMOTHERAPY-INDUCED NEUTROPENIA (HCC): ICD-10-CM

## 2024-12-03 LAB
ALBUMIN SERPL-MCNC: 2 G/DL (ref 3.5–5)
ALBUMIN/GLOB SERPL: 0.5 (ref 1.1–2.2)
ALP SERPL-CCNC: 396 U/L (ref 45–117)
ALT SERPL-CCNC: 36 U/L (ref 12–78)
ANION GAP SERPL CALC-SCNC: 6 MMOL/L (ref 2–12)
AST SERPL-CCNC: 54 U/L (ref 15–37)
BASOPHILS # BLD: 0 K/UL (ref 0–0.1)
BASOPHILS NFR BLD: 0 % (ref 0–1)
BILIRUB SERPL-MCNC: 0.7 MG/DL (ref 0.2–1)
BUN SERPL-MCNC: 14 MG/DL (ref 6–20)
BUN/CREAT SERPL: 13 (ref 12–20)
CALCIUM SERPL-MCNC: 8 MG/DL (ref 8.5–10.1)
CHLORIDE SERPL-SCNC: 110 MMOL/L (ref 97–108)
CO2 SERPL-SCNC: 23 MMOL/L (ref 21–32)
COMMENT:: NORMAL
CREAT SERPL-MCNC: 1.11 MG/DL (ref 0.7–1.3)
DIFFERENTIAL METHOD BLD: ABNORMAL
EOSINOPHIL # BLD: 0 K/UL (ref 0–0.4)
EOSINOPHIL NFR BLD: 0 % (ref 0–7)
ERYTHROCYTE [DISTWIDTH] IN BLOOD BY AUTOMATED COUNT: 16.4 % (ref 11.5–14.5)
FLUAV RNA SPEC QL NAA+PROBE: NOT DETECTED
FLUBV RNA SPEC QL NAA+PROBE: NOT DETECTED
GLOBULIN SER CALC-MCNC: 4 G/DL (ref 2–4)
GLUCOSE SERPL-MCNC: 89 MG/DL (ref 65–100)
HCT VFR BLD AUTO: 30.7 % (ref 36.6–50.3)
HGB BLD-MCNC: 9.9 G/DL (ref 12.1–17)
IMM GRANULOCYTES # BLD AUTO: 0 K/UL
IMM GRANULOCYTES NFR BLD AUTO: 0 %
LACTATE SERPL-SCNC: 1.8 MMOL/L (ref 0.4–2)
LYMPHOCYTES # BLD: 0.3 K/UL (ref 0.8–3.5)
LYMPHOCYTES NFR BLD: 28 % (ref 12–49)
MCH RBC QN AUTO: 29.6 PG (ref 26–34)
MCHC RBC AUTO-ENTMCNC: 32.2 G/DL (ref 30–36.5)
MCV RBC AUTO: 91.9 FL (ref 80–99)
METAMYELOCYTES NFR BLD MANUAL: 1 %
MONOCYTES # BLD: 0.1 K/UL (ref 0–1)
MONOCYTES NFR BLD: 7 % (ref 5–13)
MYELOCYTES NFR BLD MANUAL: 1 %
NEUTS BAND NFR BLD MANUAL: 6 % (ref 0–6)
NEUTS SEG # BLD: 0.6 K/UL (ref 1.8–8)
NEUTS SEG NFR BLD: 57 % (ref 32–75)
NRBC # BLD: 0 K/UL (ref 0–0.01)
NRBC BLD-RTO: 0 PER 100 WBC
PLATELET # BLD AUTO: 265 K/UL (ref 150–400)
PMV BLD AUTO: 9.1 FL (ref 8.9–12.9)
POTASSIUM SERPL-SCNC: 4.2 MMOL/L (ref 3.5–5.1)
PROT SERPL-MCNC: 6 G/DL (ref 6.4–8.2)
RBC # BLD AUTO: 3.34 M/UL (ref 4.1–5.7)
RBC MORPH BLD: ABNORMAL
SARS-COV-2 RNA RESP QL NAA+PROBE: NOT DETECTED
SODIUM SERPL-SCNC: 139 MMOL/L (ref 136–145)
SOURCE: NORMAL
SPECIMEN HOLD: NORMAL
WBC # BLD AUTO: 1 K/UL (ref 4.1–11.1)

## 2024-12-03 PROCEDURE — 87040 BLOOD CULTURE FOR BACTERIA: CPT

## 2024-12-03 PROCEDURE — 2500000003 HC RX 250 WO HCPCS: Performed by: STUDENT IN AN ORGANIZED HEALTH CARE EDUCATION/TRAINING PROGRAM

## 2024-12-03 PROCEDURE — 96365 THER/PROPH/DIAG IV INF INIT: CPT

## 2024-12-03 PROCEDURE — 87077 CULTURE AEROBIC IDENTIFY: CPT

## 2024-12-03 PROCEDURE — 6360000004 HC RX CONTRAST MEDICATION: Performed by: STUDENT IN AN ORGANIZED HEALTH CARE EDUCATION/TRAINING PROGRAM

## 2024-12-03 PROCEDURE — 80053 COMPREHEN METABOLIC PANEL: CPT

## 2024-12-03 PROCEDURE — 96376 TX/PRO/DX INJ SAME DRUG ADON: CPT

## 2024-12-03 PROCEDURE — 85025 COMPLETE CBC W/AUTO DIFF WBC: CPT

## 2024-12-03 PROCEDURE — 74177 CT ABD & PELVIS W/CONTRAST: CPT

## 2024-12-03 PROCEDURE — 87186 SC STD MICRODIL/AGAR DIL: CPT

## 2024-12-03 PROCEDURE — 36415 COLL VENOUS BLD VENIPUNCTURE: CPT

## 2024-12-03 PROCEDURE — 83605 ASSAY OF LACTIC ACID: CPT

## 2024-12-03 PROCEDURE — 87154 CUL TYP ID BLD PTHGN 6+ TRGT: CPT

## 2024-12-03 PROCEDURE — 2580000003 HC RX 258: Performed by: STUDENT IN AN ORGANIZED HEALTH CARE EDUCATION/TRAINING PROGRAM

## 2024-12-03 PROCEDURE — 87636 SARSCOV2 & INF A&B AMP PRB: CPT

## 2024-12-03 PROCEDURE — 96374 THER/PROPH/DIAG INJ IV PUSH: CPT

## 2024-12-03 PROCEDURE — 6360000002 HC RX W HCPCS: Performed by: STUDENT IN AN ORGANIZED HEALTH CARE EDUCATION/TRAINING PROGRAM

## 2024-12-03 PROCEDURE — 99285 EMERGENCY DEPT VISIT HI MDM: CPT

## 2024-12-03 RX ORDER — HYDROMORPHONE HYDROCHLORIDE 1 MG/ML
0.5 INJECTION, SOLUTION INTRAMUSCULAR; INTRAVENOUS; SUBCUTANEOUS
Status: COMPLETED | OUTPATIENT
Start: 2024-12-03 | End: 2024-12-03

## 2024-12-03 RX ORDER — HYDROMORPHONE HYDROCHLORIDE 1 MG/ML
1 INJECTION, SOLUTION INTRAMUSCULAR; INTRAVENOUS; SUBCUTANEOUS
Status: COMPLETED | OUTPATIENT
Start: 2024-12-03 | End: 2024-12-03

## 2024-12-03 RX ORDER — 0.9 % SODIUM CHLORIDE 0.9 %
1000 INTRAVENOUS SOLUTION INTRAVENOUS ONCE
Status: COMPLETED | OUTPATIENT
Start: 2024-12-03 | End: 2024-12-03

## 2024-12-03 RX ORDER — IOPAMIDOL 755 MG/ML
100 INJECTION, SOLUTION INTRAVASCULAR
Status: COMPLETED | OUTPATIENT
Start: 2024-12-03 | End: 2024-12-03

## 2024-12-03 RX ADMIN — PIPERACILLIN AND TAZOBACTAM 4500 MG: 4; .5 INJECTION, POWDER, LYOPHILIZED, FOR SOLUTION INTRAVENOUS at 23:25

## 2024-12-03 RX ADMIN — HYDROMORPHONE HYDROCHLORIDE 0.5 MG: 1 INJECTION, SOLUTION INTRAMUSCULAR; INTRAVENOUS; SUBCUTANEOUS at 23:19

## 2024-12-03 RX ADMIN — IOPAMIDOL 100 ML: 755 INJECTION, SOLUTION INTRAVENOUS at 21:57

## 2024-12-03 RX ADMIN — HYDROMORPHONE HYDROCHLORIDE 1 MG: 1 INJECTION, SOLUTION INTRAMUSCULAR; INTRAVENOUS; SUBCUTANEOUS at 21:11

## 2024-12-03 RX ADMIN — SODIUM CHLORIDE 1000 ML: 9 INJECTION, SOLUTION INTRAVENOUS at 21:11

## 2024-12-03 ASSESSMENT — PAIN DESCRIPTION - DESCRIPTORS: DESCRIPTORS: SHARP

## 2024-12-03 ASSESSMENT — PAIN DESCRIPTION - ORIENTATION: ORIENTATION: LEFT;RIGHT;LOWER;MID

## 2024-12-03 ASSESSMENT — LIFESTYLE VARIABLES
HOW MANY STANDARD DRINKS CONTAINING ALCOHOL DO YOU HAVE ON A TYPICAL DAY: PATIENT DOES NOT DRINK
HOW OFTEN DO YOU HAVE A DRINK CONTAINING ALCOHOL: NEVER

## 2024-12-03 ASSESSMENT — PAIN SCALES - GENERAL: PAINLEVEL_OUTOF10: 10

## 2024-12-03 ASSESSMENT — PAIN DESCRIPTION - LOCATION: LOCATION: ABDOMEN

## 2024-12-03 ASSESSMENT — PAIN - FUNCTIONAL ASSESSMENT: PAIN_FUNCTIONAL_ASSESSMENT: 0-10

## 2024-12-04 ENCOUNTER — APPOINTMENT (OUTPATIENT)
Facility: HOSPITAL | Age: 59
DRG: 871 | End: 2024-12-04
Payer: COMMERCIAL

## 2024-12-04 PROBLEM — E44.0 MALNUTRITION OF MODERATE DEGREE (HCC): Status: ACTIVE | Noted: 2022-05-05

## 2024-12-04 PROBLEM — K52.9 COLITIS: Status: ACTIVE | Noted: 2024-12-04

## 2024-12-04 PROBLEM — B34.8 PARAINFLUENZA INFECTION: Status: ACTIVE | Noted: 2024-12-04

## 2024-12-04 PROBLEM — Z79.899 HIGH RISK MEDICATION USE: Status: ACTIVE | Noted: 2024-12-04

## 2024-12-04 LAB
ACB COMPLEX DNA BLD POS QL NAA+NON-PROBE: NOT DETECTED
ACCESSION NUMBER, LLC1M: ABNORMAL
ANION GAP SERPL CALC-SCNC: 8 MMOL/L (ref 2–12)
APPEARANCE UR: CLEAR
B FRAGILIS DNA BLD POS QL NAA+NON-PROBE: NOT DETECTED
B PERT DNA SPEC QL NAA+PROBE: NOT DETECTED
BACTERIA URNS QL MICRO: NEGATIVE /HPF
BASOPHILS # BLD: 0 K/UL (ref 0–0.1)
BASOPHILS NFR BLD: 0 % (ref 0–1)
BILIRUB UR QL: NEGATIVE
BIOFIRE TEST COMMENT: ABNORMAL
BORDETELLA PARAPERTUSSIS BY PCR: NOT DETECTED
BUN SERPL-MCNC: 16 MG/DL (ref 6–20)
BUN/CREAT SERPL: 14 (ref 12–20)
C ALBICANS DNA BLD POS QL NAA+NON-PROBE: NOT DETECTED
C AURIS DNA BLD POS QL NAA+NON-PROBE: NOT DETECTED
C GATTII+NEOFOR DNA BLD POS QL NAA+N-PRB: NOT DETECTED
C GLABRATA DNA BLD POS QL NAA+NON-PROBE: NOT DETECTED
C KRUSEI DNA BLD POS QL NAA+NON-PROBE: NOT DETECTED
C PARAP DNA BLD POS QL NAA+NON-PROBE: NOT DETECTED
C PNEUM DNA SPEC QL NAA+PROBE: NOT DETECTED
C TROPICLS DNA BLD POS QL NAA+NON-PROBE: NOT DETECTED
CALCIUM SERPL-MCNC: 7.3 MG/DL (ref 8.5–10.1)
CHLORIDE SERPL-SCNC: 113 MMOL/L (ref 97–108)
CO2 SERPL-SCNC: 18 MMOL/L (ref 21–32)
COLOR UR: ABNORMAL
CREAT SERPL-MCNC: 1.14 MG/DL (ref 0.7–1.3)
DIFFERENTIAL METHOD BLD: ABNORMAL
E CLOAC COMP DNA BLD POS NAA+NON-PROBE: NOT DETECTED
E COLI DNA BLD POS QL NAA+NON-PROBE: NOT DETECTED
E FAECALIS DNA BLD POS QL NAA+NON-PROBE: NOT DETECTED
E FAECIUM DNA BLD POS QL NAA+NON-PROBE: NOT DETECTED
ENTEROBACTERALES DNA BLD POS NAA+N-PRB: NOT DETECTED
EOSINOPHIL # BLD: 0 K/UL (ref 0–0.4)
EOSINOPHIL NFR BLD: 0 % (ref 0–7)
EPITH CASTS URNS QL MICRO: ABNORMAL /LPF
ERYTHROCYTE [DISTWIDTH] IN BLOOD BY AUTOMATED COUNT: 16.9 % (ref 11.5–14.5)
FLUAV SUBTYP SPEC NAA+PROBE: NOT DETECTED
FLUBV RNA SPEC QL NAA+PROBE: NOT DETECTED
GLUCOSE SERPL-MCNC: 68 MG/DL (ref 65–100)
GLUCOSE UR STRIP.AUTO-MCNC: NEGATIVE MG/DL
GP B STREP DNA BLD POS QL NAA+NON-PROBE: NOT DETECTED
HADV DNA SPEC QL NAA+PROBE: NOT DETECTED
HAEM INFLU DNA BLD POS QL NAA+NON-PROBE: NOT DETECTED
HCOV 229E RNA SPEC QL NAA+PROBE: NOT DETECTED
HCOV HKU1 RNA SPEC QL NAA+PROBE: NOT DETECTED
HCOV NL63 RNA SPEC QL NAA+PROBE: NOT DETECTED
HCOV OC43 RNA SPEC QL NAA+PROBE: NOT DETECTED
HCT VFR BLD AUTO: 32.6 % (ref 36.6–50.3)
HGB BLD-MCNC: 10.4 G/DL (ref 12.1–17)
HGB UR QL STRIP: NEGATIVE
HMPV RNA SPEC QL NAA+PROBE: NOT DETECTED
HPIV1 RNA SPEC QL NAA+PROBE: DETECTED
HPIV2 RNA SPEC QL NAA+PROBE: NOT DETECTED
HPIV3 RNA SPEC QL NAA+PROBE: NOT DETECTED
HPIV4 RNA SPEC QL NAA+PROBE: NOT DETECTED
HYALINE CASTS URNS QL MICRO: ABNORMAL /LPF (ref 0–5)
IMM GRANULOCYTES # BLD AUTO: 0 K/UL
IMM GRANULOCYTES NFR BLD AUTO: 0 %
K OXYTOCA DNA BLD POS QL NAA+NON-PROBE: NOT DETECTED
KETONES UR QL STRIP.AUTO: NEGATIVE MG/DL
KLEBSIELLA SP DNA BLD POS QL NAA+NON-PRB: NOT DETECTED
KLEBSIELLA SP DNA BLD POS QL NAA+NON-PRB: NOT DETECTED
L MONOCYTOG DNA BLD POS QL NAA+NON-PROBE: NOT DETECTED
LEUKOCYTE ESTERASE UR QL STRIP.AUTO: NEGATIVE
LIPASE SERPL-CCNC: 12 U/L (ref 13–75)
LYMPHOCYTES # BLD: 0.4 K/UL (ref 0.8–3.5)
LYMPHOCYTES NFR BLD: 2 % (ref 12–49)
M PNEUMO DNA SPEC QL NAA+PROBE: NOT DETECTED
MCH RBC QN AUTO: 29.9 PG (ref 26–34)
MCHC RBC AUTO-ENTMCNC: 31.9 G/DL (ref 30–36.5)
MCV RBC AUTO: 93.7 FL (ref 80–99)
METAMYELOCYTES NFR BLD MANUAL: 1 %
MONOCYTES # BLD: 0.6 K/UL (ref 0–1)
MONOCYTES NFR BLD: 3 % (ref 5–13)
N MEN DNA BLD POS QL NAA+NON-PROBE: NOT DETECTED
NEUTS BAND NFR BLD MANUAL: 49 % (ref 0–6)
NEUTS SEG # BLD: 17.7 K/UL (ref 1.8–8)
NEUTS SEG NFR BLD: 45 % (ref 32–75)
NITRITE UR QL STRIP.AUTO: NEGATIVE
NRBC # BLD: 0 K/UL (ref 0–0.01)
NRBC BLD-RTO: 0 PER 100 WBC
P AERUGINOSA DNA BLD POS NAA+NON-PROBE: NOT DETECTED
PH UR STRIP: 5 (ref 5–8)
PLATELET # BLD AUTO: 292 K/UL (ref 150–400)
PMV BLD AUTO: 9.4 FL (ref 8.9–12.9)
POTASSIUM SERPL-SCNC: 4.9 MMOL/L (ref 3.5–5.1)
PROT UR STRIP-MCNC: ABNORMAL MG/DL
PROTEUS SP DNA BLD POS QL NAA+NON-PROBE: NOT DETECTED
RBC # BLD AUTO: 3.48 M/UL (ref 4.1–5.7)
RBC #/AREA URNS HPF: ABNORMAL /HPF (ref 0–5)
RBC MORPH BLD: ABNORMAL
RBC MORPH BLD: ABNORMAL
RESISTANT GENE TARGETS: ABNORMAL
RSV RNA SPEC QL NAA+PROBE: NOT DETECTED
RV+EV RNA SPEC QL NAA+PROBE: NOT DETECTED
S AUREUS DNA BLD POS QL NAA+NON-PROBE: NOT DETECTED
S AUREUS+CONS DNA BLD POS NAA+NON-PROBE: NOT DETECTED
S EPIDERMIDIS DNA BLD POS QL NAA+NON-PRB: NOT DETECTED
S LUGDUNENSIS DNA BLD POS QL NAA+NON-PRB: NOT DETECTED
S MALTOPHILIA DNA BLD POS QL NAA+NON-PRB: NOT DETECTED
S MARCESCENS DNA BLD POS NAA+NON-PROBE: NOT DETECTED
S PNEUM DNA BLD POS QL NAA+NON-PROBE: NOT DETECTED
S PYO DNA BLD POS QL NAA+NON-PROBE: NOT DETECTED
SALMONELLA DNA BLD POS QL NAA+NON-PROBE: NOT DETECTED
SARS-COV-2 RNA RESP QL NAA+PROBE: NOT DETECTED
SODIUM SERPL-SCNC: 139 MMOL/L (ref 136–145)
SP GR UR REFRACTOMETRY: 1.01 (ref 1–1.03)
STREPTOCOCCUS DNA BLD POS NAA+NON-PROBE: DETECTED
URINE CULTURE IF INDICATED: ABNORMAL
UROBILINOGEN UR QL STRIP.AUTO: 0.2 EU/DL (ref 0.2–1)
WBC # BLD AUTO: 18.8 K/UL (ref 4.1–11.1)
WBC URNS QL MICRO: ABNORMAL /HPF (ref 0–4)

## 2024-12-04 PROCEDURE — 36415 COLL VENOUS BLD VENIPUNCTURE: CPT

## 2024-12-04 PROCEDURE — 6360000002 HC RX W HCPCS: Performed by: INTERNAL MEDICINE

## 2024-12-04 PROCEDURE — 97530 THERAPEUTIC ACTIVITIES: CPT

## 2024-12-04 PROCEDURE — 97161 PT EVAL LOW COMPLEX 20 MIN: CPT

## 2024-12-04 PROCEDURE — 76705 ECHO EXAM OF ABDOMEN: CPT

## 2024-12-04 PROCEDURE — 0202U NFCT DS 22 TRGT SARS-COV-2: CPT

## 2024-12-04 PROCEDURE — 2580000003 HC RX 258: Performed by: INTERNAL MEDICINE

## 2024-12-04 PROCEDURE — 96375 TX/PRO/DX INJ NEW DRUG ADDON: CPT

## 2024-12-04 PROCEDURE — 94761 N-INVAS EAR/PLS OXIMETRY MLT: CPT

## 2024-12-04 PROCEDURE — 81001 URINALYSIS AUTO W/SCOPE: CPT

## 2024-12-04 PROCEDURE — 6370000000 HC RX 637 (ALT 250 FOR IP): Performed by: INTERNAL MEDICINE

## 2024-12-04 PROCEDURE — 83690 ASSAY OF LIPASE: CPT

## 2024-12-04 PROCEDURE — 80048 BASIC METABOLIC PNL TOTAL CA: CPT

## 2024-12-04 PROCEDURE — 6360000002 HC RX W HCPCS: Performed by: STUDENT IN AN ORGANIZED HEALTH CARE EDUCATION/TRAINING PROGRAM

## 2024-12-04 PROCEDURE — 85025 COMPLETE CBC W/AUTO DIFF WBC: CPT

## 2024-12-04 PROCEDURE — 99254 IP/OBS CNSLTJ NEW/EST MOD 60: CPT | Performed by: INTERNAL MEDICINE

## 2024-12-04 PROCEDURE — 2580000003 HC RX 258: Performed by: STUDENT IN AN ORGANIZED HEALTH CARE EDUCATION/TRAINING PROGRAM

## 2024-12-04 PROCEDURE — 2500000003 HC RX 250 WO HCPCS: Performed by: INTERNAL MEDICINE

## 2024-12-04 PROCEDURE — 2060000000 HC ICU INTERMEDIATE R&B

## 2024-12-04 PROCEDURE — 97165 OT EVAL LOW COMPLEX 30 MIN: CPT

## 2024-12-04 RX ORDER — MAGNESIUM SULFATE IN WATER 40 MG/ML
2000 INJECTION, SOLUTION INTRAVENOUS PRN
Status: DISCONTINUED | OUTPATIENT
Start: 2024-12-04 | End: 2024-12-06 | Stop reason: HOSPADM

## 2024-12-04 RX ORDER — POLYETHYLENE GLYCOL 3350 17 G/17G
17 POWDER, FOR SOLUTION ORAL DAILY PRN
Status: DISCONTINUED | OUTPATIENT
Start: 2024-12-04 | End: 2024-12-06 | Stop reason: HOSPADM

## 2024-12-04 RX ORDER — ACETAMINOPHEN 650 MG/1
650 SUPPOSITORY RECTAL EVERY 6 HOURS PRN
Status: DISCONTINUED | OUTPATIENT
Start: 2024-12-04 | End: 2024-12-04

## 2024-12-04 RX ORDER — SODIUM CHLORIDE 9 MG/ML
INJECTION, SOLUTION INTRAVENOUS PRN
Status: DISCONTINUED | OUTPATIENT
Start: 2024-12-04 | End: 2024-12-06 | Stop reason: HOSPADM

## 2024-12-04 RX ORDER — MORPHINE SULFATE 4 MG/ML
4 INJECTION, SOLUTION INTRAMUSCULAR; INTRAVENOUS EVERY 4 HOURS PRN
Status: DISCONTINUED | OUTPATIENT
Start: 2024-12-04 | End: 2024-12-06 | Stop reason: HOSPADM

## 2024-12-04 RX ORDER — ACETAMINOPHEN 325 MG/1
650 TABLET ORAL EVERY 4 HOURS PRN
Status: DISCONTINUED | OUTPATIENT
Start: 2024-12-04 | End: 2024-12-06 | Stop reason: HOSPADM

## 2024-12-04 RX ORDER — SODIUM CHLORIDE 0.9 % (FLUSH) 0.9 %
5-40 SYRINGE (ML) INJECTION PRN
Status: DISCONTINUED | OUTPATIENT
Start: 2024-12-04 | End: 2024-12-06 | Stop reason: HOSPADM

## 2024-12-04 RX ORDER — HYDROMORPHONE HYDROCHLORIDE 1 MG/ML
1 INJECTION, SOLUTION INTRAMUSCULAR; INTRAVENOUS; SUBCUTANEOUS
Status: DISCONTINUED | OUTPATIENT
Start: 2024-12-04 | End: 2024-12-04

## 2024-12-04 RX ORDER — METRONIDAZOLE 500 MG/100ML
500 INJECTION, SOLUTION INTRAVENOUS EVERY 8 HOURS
Status: DISCONTINUED | OUTPATIENT
Start: 2024-12-04 | End: 2024-12-06

## 2024-12-04 RX ORDER — ENOXAPARIN SODIUM 100 MG/ML
40 INJECTION SUBCUTANEOUS DAILY
Status: DISCONTINUED | OUTPATIENT
Start: 2024-12-04 | End: 2024-12-06 | Stop reason: HOSPADM

## 2024-12-04 RX ORDER — ONDANSETRON 4 MG/1
4 TABLET, ORALLY DISINTEGRATING ORAL EVERY 8 HOURS PRN
Status: DISCONTINUED | OUTPATIENT
Start: 2024-12-04 | End: 2024-12-06 | Stop reason: HOSPADM

## 2024-12-04 RX ORDER — POTASSIUM CHLORIDE 750 MG/1
40 TABLET, EXTENDED RELEASE ORAL PRN
Status: DISCONTINUED | OUTPATIENT
Start: 2024-12-04 | End: 2024-12-06 | Stop reason: HOSPADM

## 2024-12-04 RX ORDER — KETOROLAC TROMETHAMINE 15 MG/ML
15 INJECTION, SOLUTION INTRAMUSCULAR; INTRAVENOUS ONCE
Status: COMPLETED | OUTPATIENT
Start: 2024-12-04 | End: 2024-12-04

## 2024-12-04 RX ORDER — MORPHINE SULFATE 2 MG/ML
2 INJECTION, SOLUTION INTRAMUSCULAR; INTRAVENOUS EVERY 4 HOURS PRN
Status: DISCONTINUED | OUTPATIENT
Start: 2024-12-04 | End: 2024-12-06 | Stop reason: HOSPADM

## 2024-12-04 RX ORDER — ONDANSETRON 2 MG/ML
4 INJECTION INTRAMUSCULAR; INTRAVENOUS EVERY 6 HOURS PRN
Status: DISCONTINUED | OUTPATIENT
Start: 2024-12-04 | End: 2024-12-06 | Stop reason: HOSPADM

## 2024-12-04 RX ORDER — ACETAMINOPHEN 325 MG/1
650 TABLET ORAL EVERY 6 HOURS PRN
Status: DISCONTINUED | OUTPATIENT
Start: 2024-12-04 | End: 2024-12-04

## 2024-12-04 RX ORDER — HYDROMORPHONE HYDROCHLORIDE 1 MG/ML
0.5 INJECTION, SOLUTION INTRAMUSCULAR; INTRAVENOUS; SUBCUTANEOUS
Status: DISCONTINUED | OUTPATIENT
Start: 2024-12-04 | End: 2024-12-04

## 2024-12-04 RX ORDER — OXYCODONE HYDROCHLORIDE 5 MG/1
5 TABLET ORAL EVERY 4 HOURS PRN
Status: DISCONTINUED | OUTPATIENT
Start: 2024-12-04 | End: 2024-12-06 | Stop reason: HOSPADM

## 2024-12-04 RX ORDER — SODIUM CHLORIDE 9 MG/ML
INJECTION, SOLUTION INTRAVENOUS CONTINUOUS
Status: DISPENSED | OUTPATIENT
Start: 2024-12-04 | End: 2024-12-05

## 2024-12-04 RX ORDER — 0.9 % SODIUM CHLORIDE 0.9 %
30 INTRAVENOUS SOLUTION INTRAVENOUS ONCE
Status: COMPLETED | OUTPATIENT
Start: 2024-12-04 | End: 2024-12-04

## 2024-12-04 RX ORDER — PROCHLORPERAZINE EDISYLATE 5 MG/ML
5 INJECTION INTRAMUSCULAR; INTRAVENOUS EVERY 6 HOURS PRN
Status: DISCONTINUED | OUTPATIENT
Start: 2024-12-04 | End: 2024-12-06 | Stop reason: HOSPADM

## 2024-12-04 RX ORDER — 0.9 % SODIUM CHLORIDE 0.9 %
1000 INTRAVENOUS SOLUTION INTRAVENOUS ONCE
Status: COMPLETED | OUTPATIENT
Start: 2024-12-04 | End: 2024-12-04

## 2024-12-04 RX ORDER — POTASSIUM CHLORIDE 7.45 MG/ML
10 INJECTION INTRAVENOUS PRN
Status: DISCONTINUED | OUTPATIENT
Start: 2024-12-04 | End: 2024-12-06 | Stop reason: HOSPADM

## 2024-12-04 RX ORDER — MIDODRINE HYDROCHLORIDE 5 MG/1
10 TABLET ORAL
Status: DISCONTINUED | OUTPATIENT
Start: 2024-12-04 | End: 2024-12-06 | Stop reason: HOSPADM

## 2024-12-04 RX ORDER — SODIUM CHLORIDE 0.9 % (FLUSH) 0.9 %
5-40 SYRINGE (ML) INJECTION EVERY 12 HOURS SCHEDULED
Status: DISCONTINUED | OUTPATIENT
Start: 2024-12-04 | End: 2024-12-06 | Stop reason: HOSPADM

## 2024-12-04 RX ORDER — TAMSULOSIN HYDROCHLORIDE 0.4 MG/1
0.4 CAPSULE ORAL DAILY
Status: DISCONTINUED | OUTPATIENT
Start: 2024-12-04 | End: 2024-12-06 | Stop reason: HOSPADM

## 2024-12-04 RX ADMIN — HYDROMORPHONE HYDROCHLORIDE 1 MG: 1 INJECTION, SOLUTION INTRAMUSCULAR; INTRAVENOUS; SUBCUTANEOUS at 03:52

## 2024-12-04 RX ADMIN — CEFEPIME 2000 MG: 2 INJECTION, POWDER, FOR SOLUTION INTRAVENOUS at 03:59

## 2024-12-04 RX ADMIN — ENOXAPARIN SODIUM 40 MG: 100 INJECTION SUBCUTANEOUS at 09:48

## 2024-12-04 RX ADMIN — SODIUM CHLORIDE, PRESERVATIVE FREE 10 ML: 5 INJECTION INTRAVENOUS at 21:04

## 2024-12-04 RX ADMIN — SODIUM CHLORIDE, PRESERVATIVE FREE 10 ML: 5 INJECTION INTRAVENOUS at 21:05

## 2024-12-04 RX ADMIN — SODIUM CHLORIDE: 9 INJECTION, SOLUTION INTRAVENOUS at 18:08

## 2024-12-04 RX ADMIN — PROCHLORPERAZINE EDISYLATE 5 MG: 5 INJECTION INTRAMUSCULAR; INTRAVENOUS at 13:48

## 2024-12-04 RX ADMIN — VANCOMYCIN HYDROCHLORIDE 750 MG: 750 INJECTION, POWDER, LYOPHILIZED, FOR SOLUTION INTRAVENOUS at 15:34

## 2024-12-04 RX ADMIN — METRONIDAZOLE 500 MG: 500 INJECTION, SOLUTION INTRAVENOUS at 18:11

## 2024-12-04 RX ADMIN — TAMSULOSIN HYDROCHLORIDE 0.4 MG: 0.4 CAPSULE ORAL at 09:47

## 2024-12-04 RX ADMIN — OXYCODONE 5 MG: 5 TABLET ORAL at 21:04

## 2024-12-04 RX ADMIN — CEFEPIME 2000 MG: 2 INJECTION, POWDER, FOR SOLUTION INTRAVENOUS at 09:56

## 2024-12-04 RX ADMIN — METRONIDAZOLE 500 MG: 500 INJECTION, SOLUTION INTRAVENOUS at 05:04

## 2024-12-04 RX ADMIN — CEFEPIME 2000 MG: 2 INJECTION, POWDER, FOR SOLUTION INTRAVENOUS at 18:02

## 2024-12-04 RX ADMIN — SODIUM CHLORIDE 1929 ML: 9 INJECTION, SOLUTION INTRAVENOUS at 02:36

## 2024-12-04 RX ADMIN — KETOROLAC TROMETHAMINE 15 MG: 15 INJECTION, SOLUTION INTRAMUSCULAR; INTRAVENOUS at 00:41

## 2024-12-04 RX ADMIN — PHENYLEPHRINE HYDROCHLORIDE 30 MCG/MIN: 10 INJECTION INTRAVENOUS at 02:35

## 2024-12-04 RX ADMIN — ONDANSETRON 4 MG: 2 INJECTION INTRAMUSCULAR; INTRAVENOUS at 11:21

## 2024-12-04 RX ADMIN — OXYCODONE 5 MG: 5 TABLET ORAL at 11:23

## 2024-12-04 RX ADMIN — ONDANSETRON 4 MG: 2 INJECTION INTRAMUSCULAR; INTRAVENOUS at 17:52

## 2024-12-04 RX ADMIN — MIDODRINE HYDROCHLORIDE 10 MG: 5 TABLET ORAL at 16:00

## 2024-12-04 RX ADMIN — ACETAMINOPHEN 650 MG: 325 TABLET ORAL at 02:44

## 2024-12-04 RX ADMIN — SODIUM CHLORIDE 1500 MG: 9 INJECTION, SOLUTION INTRAVENOUS at 02:30

## 2024-12-04 RX ADMIN — MIDODRINE HYDROCHLORIDE 10 MG: 5 TABLET ORAL at 10:33

## 2024-12-04 RX ADMIN — OXYCODONE 5 MG: 5 TABLET ORAL at 01:29

## 2024-12-04 RX ADMIN — SODIUM CHLORIDE 1000 ML: 9 INJECTION, SOLUTION INTRAVENOUS at 00:41

## 2024-12-04 RX ADMIN — METRONIDAZOLE 500 MG: 500 INJECTION, SOLUTION INTRAVENOUS at 10:27

## 2024-12-04 RX ADMIN — HYDROMORPHONE HYDROCHLORIDE 1 MG: 1 INJECTION, SOLUTION INTRAMUSCULAR; INTRAVENOUS; SUBCUTANEOUS at 09:36

## 2024-12-04 RX ADMIN — PHENYLEPHRINE HYDROCHLORIDE 70 MCG/MIN: 10 INJECTION INTRAVENOUS at 23:39

## 2024-12-04 RX ADMIN — SODIUM CHLORIDE: 9 INJECTION, SOLUTION INTRAVENOUS at 04:00

## 2024-12-04 ASSESSMENT — PAIN - FUNCTIONAL ASSESSMENT: PAIN_FUNCTIONAL_ASSESSMENT: PREVENTS OR INTERFERES WITH ALL ACTIVE AND SOME PASSIVE ACTIVITIES

## 2024-12-04 ASSESSMENT — PAIN DESCRIPTION - DESCRIPTORS
DESCRIPTORS: SHARP
DESCRIPTORS: ACHING
DESCRIPTORS: SHARP
DESCRIPTORS: ACHING
DESCRIPTORS: SHARP
DESCRIPTORS: ACHING

## 2024-12-04 ASSESSMENT — PAIN DESCRIPTION - FREQUENCY: FREQUENCY: CONTINUOUS

## 2024-12-04 ASSESSMENT — PAIN DESCRIPTION - LOCATION
LOCATION: ABDOMEN

## 2024-12-04 ASSESSMENT — PAIN DESCRIPTION - ORIENTATION
ORIENTATION: OTHER (COMMENT)
ORIENTATION: RIGHT;LEFT;LOWER
ORIENTATION: OTHER (COMMENT)

## 2024-12-04 ASSESSMENT — PAIN SCALES - GENERAL
PAINLEVEL_OUTOF10: 10
PAINLEVEL_OUTOF10: 7
PAINLEVEL_OUTOF10: 9
PAINLEVEL_OUTOF10: 9
PAINLEVEL_OUTOF10: 8
PAINLEVEL_OUTOF10: 6
PAINLEVEL_OUTOF10: 7

## 2024-12-04 ASSESSMENT — PAIN SCALES - WONG BAKER: WONGBAKER_NUMERICALRESPONSE: NO HURT

## 2024-12-04 ASSESSMENT — PAIN DESCRIPTION - ONSET: ONSET: ON-GOING

## 2024-12-04 NOTE — ED NOTES
Bedside and Verbal shift change report given to Jair (oncoming nurse) by RIP (offgoing nurse). Report included the following information ED Encounter Summary, ED SBAR, MAR, and Recent Results.

## 2024-12-04 NOTE — PROGRESS NOTES
[facility]  [address] [place] [address] [phone]        Hospitalist Progress Note      NAME: Teto Prince   :  1965  MRM:  [id number]    Date/Time: 2024  3:19 PM         Subjective:     Chief Complaint: \"I have pain\"     PT seen and examined. Still with lower ab pain. Much more profound than usual baseline ab pain     ROS:  (bold if positive, if negative)            Objective:       Vitals:          Last 24hrs VS reviewed since prior progress note. Most recent are:    Vitals:    24 1430   BP: (!) 85/56   Pulse:    Resp:    Temp:    SpO2:      SpO2 Readings from Last 6 Encounters:   24 97%   24 96%   24 100%          Intake/Output Summary (Last 24 hours) at 2024 1519  Last data filed at 2024 0649  Gross per 24 hour   Intake 673.16 ml   Output --   Net 673.16 ml          Exam:     Physical Exam:    Gen: thin male. NAD   HEENT:  Pink conjunctivae, PERRL, hearing intact to voice, moist mucous membranes  Neck:  Supple, without masses, thyroid non-tender  Resp:  No accessory muscle use, clear breath sounds without wheezes rales or rhonchi  Card:  No murmurs, normal S1, S2 without thrills, bruits or peripheral edema  Abd: lower mid abdominal pain. Not diffusely tender. No Burns's sign  Musc:  No cyanosis or clubbing  Skin:  No rashes or ulcers, skin turgor is good  Neuro:  Cranial nerves 3-12 are grossly intact   Psych:  Good insight, oriented to person, place and time, alert  R sided port C/D/I        Medications Reviewed: (see below)    Lab Data Reviewed: (see below)    ______________________________________________________________________    Medications:     Current Facility-Administered Medications   Medication Dose Route Frequency    metroNIDAZOLE (FLAGYL) 500 mg in 0.9% NaCl 100 mL IVPB premix  500 mg IntraVENous Q8H    sodium chloride flush 0.9 % injection 5-40 mL  5-40 mL IntraVENous 2 times per day    sodium

## 2024-12-04 NOTE — ED TRIAGE NOTES
PT reports frequents  RLQ  and LLQ  abd pain, diarrhea that started today  Denies nausea or vomiting   Took 2 oxycodones tonight     Hx of esophageal cancer, gastric bypass

## 2024-12-04 NOTE — PLAN OF CARE
Problem: Occupational Therapy - Adult  Goal: By Discharge: Performs self-care activities at highest level of function for planned discharge setting.  See evaluation for individualized goals.  Description: FUNCTIONAL STATUS PRIOR TO ADMISSION:  Patient is independent for self care and functional transfers/mobility.     HOME SUPPORT: Patient lived with spouse but didn't require assistance.    Occupational Therapy Goals:  Initiated 12/4/2024  1.  Patient will perform grooming with [place] within 7 day(s).  2.  Patient will perform upper body dressing with [place] within 7 day(s).  3.  Patient will perform lower body dressing with [place] within 7 day(s).  4.  Patient will perform toilet transfers with [place]  within 7 day(s).  5.  Patient will perform all aspects of toileting with [place] within 7 day(s).  6.  Patient will participate in upper extremity therapeutic exercise/activities with [place] for 10 minutes within 7 day(s).    7.  Patient will utilize energy conservation techniques during functional activities with verbal cues within 7 day(s).      Outcome: Progressing     OCCUPATIONAL THERAPY EVALUATION    Patient: Teto Prince (59 y.o. male)  Date: 12/4/2024  Primary Diagnosis: Colitis [K52.9]  Chemotherapy-induced neutropenia (HCC) [D70.1, T45.1X5A]  Pneumonia of both lower lobes due to infectious organism [J18.9]         Precautions:                    ASSESSMENT :  Patient is 60 y/o male came to [facility] with abdominal pain and adm 12/3/2024 for acute colitis, sepsis with septic shock. Patient with hx of prostate cancer, esophageal cancer and is s/p chemoradiation and esophagectomy/gastrectomy, gastric adenocarcinoma and recent metastatic recurrence of his cancer.     Patient received semi supine in bed A&OX4 and agreeable for OT eval/tx. Per pt report, pt lives with spouse in a two story home with 1 step no rails to enter and aprox 14 steps one sided rail to second level    Balance:      Balance  Sitting: Intact  Standing - Static: Good  Standing - Dynamic: Good;Fair (from bed to  at door)    ADL Assessment:     Grooming: Stand by assistance  Grooming Skilled Clinical Factors: seated EOB simulated    LE Dressing: Stand by assistance  LE Dressing Skilled Clinical Factors: simulated    Toileting: Stand by assistance  Toileting Skilled Clinical Factors: simulated    ADL Intervention and task modifications:      [facility]-PACT[facility] \"6 Clicks\"                                                       Daily Activity Inpatient Short Form  How much help from another person does the patient currently need... Total; A Lot A Little None   1.  Putting on and taking off regular lower body clothing? []  1 []  2 [x]  3 []  4   2.  Bathing (including washing, rinsing, drying)? []  1 []  2 [x]  3 []  4   3.  Toileting, which includes using toilet, bedpan or urinal? [] 1 []  2 [x]  3 []  4   4.  Putting on and taking off regular upper body clothing? []  1 []  2 []  3 [x]  4   5.  Taking care of personal grooming such as brushing teeth? []  1 []  2 [x]  3 []  4   6.  Eating meals? []  1 []  2 []  3 [x]  4   © , Trustees of [facility], under license to [employer]. All rights reserved     Score: 20/24     Interpretation of Tool:  Represents clinically-significant functional categories (i.e. Activities of daily living).    Cutoff score 39.4 (19) correlates to a good likelihood of discharging home versus a facility  Kerline Miarnda, Kinjal Samuel, Tyler Mauricio, Christina Ruano, Inocencio Somers, Alberto Miranda, AM-PAC “6-Clicks” Functional Assessment Scores Predict Acute Care Hospital Discharge Destination, Physical Therapy, Volume 94, Issue 9, 2014, Pages 5611-2142, [email or web address]    Pain Ratin/10 abdominal pain  Pain Intervention(s):   nursing notified    Activity Tolerance:   Good and Fair     After treatment:   Seated in transport

## 2024-12-04 NOTE — CONSULTS
[facility]  [address], [address] [facility] [address]  W: [phone]  F: [phone]      Reason for Visit:   Teto Prince is a 59 y.o. male who is seen today for evaluation of hx of gastric/esophageal cancer on Keytruda now with colitis / possible pancreatic mass    Hematology/Oncology Treatment History:   Follows with [facility]  / Dr Pennie Hogan  Review of last note dated 11/19/24      History  of prior esophageal and prostate cancer who presents for evaluation and management of locally advanced gastric adenocarcinoma located in a previous esophagectomy pull up.     Poorly differentiated gastric adenocarcinoma, HER2 amplified, pMMR/RONY, at least stage II (tW1RaM6) with metastatic recurrence  History of esophageal cancer s/p chemoradiation followed by esophagectomy (2008)  History of prostate cancer s/p prostatectomy (2015)     CT restaging in 11/2023 showed findings in the pancreas concerning for pancreatitis in setting of repeated PTC interventions. Follow up MRI 1/23/2024 showed evolving pancreatitis findings without further concern for pathology in the pancreatic head. However had new ascites with cytology from this fluid positive for adenocarcinoma that we feel is likely gastric in origin. His prior biopsy 5/6/2022 was HER2 positive by FISH and his PD-L1 CPS is positive (>1, score 10-20%). For this reason, we have discussed that the next line of therapy will be FOLFOX-Herceptin-Pembrolizumab. He completed a total of 4 cycles of mFOLFOX before Oxaliplatin was discontinued due to neuropathy.     Pancreatic Abnormality.  Pancreatic Insufficiency.  Pt has had pancreatic abnormalities of unclear etiology dating back years. No activity on PET. Complex issue, but does not appear to be a primary pancreatic malignancy. Will monitor on imaging. Continue Creon for clinically diagnosed pancreatic insufficiency, slgiht increase in dosing with improvement in weight.  treatment..    Colitis  -- Discussed with patient that at this time no plans for systemic steroids.  His diarrhea reportedly has improved.  He will need to follow-up with [facility].    High risk medication use  -- No plans to start any steroids. Defer anti-cancer therapy to Dr. Hogan at [facility]. No high grade immune related toxicity from anti-PDL1 therapy but continue to monitor.    Adenocarcinoma of stomach (HCC)  -- Patient will need follow-up with [facility].  He follows with Dr. Hogan in medical oncology.  Will defer management to his primary oncologist regarding the decision for management of his immunotherapy.      Patient will need to follow-up with Dr. Hogan regarding any pancreatic abnormalities; he reportedly has ahd al  Follow-Up:  Will sign-off; please reconsult our service if needed.    I have personally seen and evaluated the patient in conjunction with Ann Schoeneweis, NP. I find the patient's history and physical exam are consistent with the NP's documentation.  I agree with the above assessment and plan, which I have modified as needed.    Gonsalo Herr MD  Hematology/Medical Oncology Provider  [facility]  P: [phone]    Signed By: Gonsalo Herr MD   12/4/24 at 4:41 PM EST

## 2024-12-04 NOTE — ED PROVIDER NOTES
[facility] EMERGENCY DEPT  EMERGENCY DEPARTMENT ENCOUNTER      Pt Name: Teto Prince  MRN: [id number]  Birthdate 1965  Date of evaluation: 12/3/2024  Provider: Brandi Davis MD    CHIEF COMPLAINT       Chief Complaint   Patient presents with    Abdominal Pain         HISTORY OF PRESENT ILLNESS    Nursing Triage Notes were reviewed.    HPI    Teto Prince is a 59 y.o. male with a history of esophageal and gastric cancer s/p gastrectomy, esophagectomy, chemotherapy and currently on keytruda who presents to the emergency department for evaluation of abdominal pain. Has had abdominal pain x 6-8 months, typically controllable with ibuprofen and oxycodone. Had diarrhea today and has had severe stabbing lower abdominal pain since. Complains of associated chills. Motrin at 7:40pm helped with chills, has taken 2 oxycodone. Now 7 out of 10 severity pain. Has not had significant nausea. Complains of a \"cold\" with rhinorrhea, cough, sore throat.     Med list brought with patient includes Creon, Lasix 20 mg, Zofran 8 mg, prochlorperazine 10 mg, and simethicone 80 mg.         PAST MEDICAL HISTORY     Past Medical History:   Diagnosis Date    Esophageal cancer (HCC)     2008    Gastric cancer (HCC) 2022    gastrectomy, [facility]    Prostate cancer (HCC)     2014    Pyloric stenosis, acquired 5/16/2022    Radiation exposure          SURGICAL HISTORY       Past Surgical History:   Procedure Laterality Date    CT DRAINAGE ABDOM ABSCESS OPEN  10/4/2022    CT DRAINAGE ABDOM ABSCESS OPEN 10/4/2022    ESOPHAGECTOMY N/A 01/01/2008    removal of tumor from esophagus and stomach    GASTRECTOMY      PROSTATECTOMY N/A     2014         CURRENT MEDICATIONS       Previous Medications    ACETAMINOPHEN (TYLENOL) 325 MG TABLET    Take by mouth every 4 hours as needed    IBUPROFEN (ADVIL;MOTRIN) 200 MG TABLET    Take 1 tablet by mouth every 6 hours as needed for Pain    LACTOBACILLUS (CULTURELLE) CAPSULE    Take 1 capsule by mouth daily  (pneumonia, hepatitis, pyelonephritis), vascular catastrophe, bowel obstruction or viscus perforation. Presentation not consistent with other acute, emergent causes of abdominal pain at this time.    Plan: labs, UA, CT AP, pain control, fluids, serial reassessment         REASSESSMENT   MEDICATIONS GIVEN:   Medications   ketorolac (TORADOL) injection 15 mg (has no administration in time range)   sodium chloride 0.9 % bolus 1,000 mL (has no administration in time range)   HYDROmorphone HCl PF (DILAUDID) injection 1 mg (1 mg IntraVENous Given 12/3/24 2111)   sodium chloride 0.9 % bolus 1,000 mL (0 mLs IntraVENous Stopped 12/3/24 2311)   iopamidol (ISOVUE-370) 76 % injection 100 mL (100 mLs IntraVENous Given 12/3/24 2157)   piperacillin-tazobactam (ZOSYN) 4,500 mg in sodium chloride 0.9 % 100 mL IVPB (mini-bag) (4,500 mg IntraVENous New Bag 12/3/24 2325)   HYDROmorphone HCl PF (DILAUDID) injection 0.5 mg (0.5 mg IntraVENous Given 12/3/24 2319)       ED Course as of 12/04/24 0034   Tue Dec 03, 2024   2142 CBC with Auto Differential(!):    WBC 1.0(!)   RBC 3.34(!)   Hemoglobin Quant 9.9(!)   Hematocrit 30.7(!)   MCV 91.9   MCH 29.6   MCHC 32.2   RDW 16.4(!)   Platelet Count 265   MPV 9.1   Nucleated Red Blood Cells 0.0   Nucleated Red Blood Cells 0.00   Neutrophils % PENDING   Lymphocyte % PENDING   Monocytes % PENDING   Eosinophils % PENDING   Basophils % PENDING   Immature Granulocytes % PENDING   Neutrophils Absolute PENDING   Lymphocytes Absolute PENDING   Monocytes Absolute PENDING   Eosinophils Absolute PENDING   Basophils Absolute PENDING   Immature Granulocytes Absolute PENDING   Differential Type PENDING  Moderate neutropenia. 630 cells/microL [LT]      ED Course User Index  [LT] Brandi Davis MD           CONSULTS:  None    PROCEDURES:  Unless otherwise noted below, none     Procedures      FINAL IMPRESSION      1. Pneumonia of both lower lobes due to infectious organism    2. Colitis    3.

## 2024-12-04 NOTE — PROGRESS NOTES
Comprehensive Nutrition Assessment    Type and Reason for Visit:  Initial    Nutrition Recommendations/Plan:   Advance diet as able:  - Eventual goal regular, with BID snacks      - Patient to order, or family providing   - Consider trial of full liquid, low fat diet today   Declines ONS  Resume Creon with meals   - consider 36k unit caps, 1 - 5 per meal with 1 - 3 per snack     Malnutrition Assessment:  Malnutrition Status:  Moderate malnutrition (12/04/24 1119)    Context:  Chronic Illness     Findings of the 6 clinical characteristics of malnutrition:  Energy Intake:  No decrease in energy intake  Weight Loss:  No weight loss     Body Fat Loss:  Severe body fat loss Triceps, Fat Overlying Ribs   Muscle Mass Loss:  Mild muscle mass loss Clavicles (pectoralis & deltoids), Temples (temporalis), Thigh (quadriceps)  Fluid Accumulation:  No fluid accumulation     Strength:  Not Performed    Nutrition Assessment:     Patient is a 59 year old male admitted with Colitis [K52.9]  Chemotherapy-induced neutropenia (HCC) [D70.1, T45.1X5A]  Pneumonia of both lower lobes due to infectious organism [J18.9]. He  has a past medical history of Esophageal cancer (HCC), Gastric cancer (HCC), Prostate cancer (HCC), Pyloric stenosis, acquired, and Radiation exposure.  -S/p jejunal interposition to restore GI continuity. Rxn L clavicle, 1st and 2nd rib. Takedown esophagostomy with esophageal-jejunal anastomosis, jejunal-jejunal anastomosis 2/9/23  -S/p total gastrectomy, partial hepatectomy, RLL wedge resection 9/22/22  -Prostate Cancer 2015 s/p prostatectomy   -Esophageal Cancer 2008 s/p esophagectomy    RD screen for low BMI. Patient on clear liquid diet with c/o mild abd pain today but wants to trial full liquids today - focused on protein. Reports he looses weight quickly and does not want to 'fall off the wagon'. UBW recently ~140#; patient believes he currently has some edema bringing weight slightly up. NKFA. Denies

## 2024-12-04 NOTE — H&P
History and Physical    Date of Service:  12/4/2024  Primary Care Provider: Albert Wagoner MD  Source of information: Patient, Family, External Medical Records    Chief Complaint: Abdominal Pain      History of Presenting Illness:   Teto Prince is a very pleasant 59 y.o. male with a past history of prostate cancer, esophageal cancer, s/p chemoradiation and esophagectomy/gastrectomy, gastric adenocarcinoma, recent metastatic recurrence of his cancer, who had a recent admission to [facility] for nephrolithiasis, who presents to the ER due to intractable bilateral lower abdominal pain with nausea and vomiting.  He is being admitted for acute colitis.      Patient is followed at 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  He was first diagnosed with esophageal cancer and is status post chemoradiation followed by esophagectomy in 2008.  He also has a history of prostate cancer s/p prostatectomy in 2015.  In 2022 he was noted to have poorly differentiated adenocarcinoma and underwent a right thoracotomy with resection of gastric conduit and mobilization of proximal esophagus.  However, in January 2024 he was admitted with peritonitis and new perihepatic ascites, and cytology obtained from his ascites demonstrated adenocarcinoma, likely gastric in origin.  He has been on Keytruda.  From a gastroenterology perspective he is following Dr. Steve at [facility].    Patient was recently admitted to [facility] for nephrolithiasis with hydronephrosis, DAVID, and concern for possible perforated gallbladder.  At that time he had a 5 mm stone on the left, and was given IV fluids and Flomax.  His symptoms were improving and he was discharged on tamsulosin with outpatient referral to urology.  During that admission his CT was concerning for possible distended gallbladder with wall perforation.  He was evaluated by general surgery but they did not recommend surgical intervention as he had no symptoms to        Result Value    WBC 1.0 (*)     RBC 3.34 (*)     Hemoglobin 9.9 (*)     Hematocrit 30.7 (*)     RDW 16.4 (*)     Metamyelocytes 1 (*)     Myelocytes 1 (*)     Neutrophils Absolute 0.6 (*)     Lymphocytes Absolute 0.3 (*)     All other components within normal limits   COMPREHENSIVE METABOLIC PANEL - Abnormal; Notable for the following components:    Chloride 110 (*)     Calcium 8.0 (*)     AST 54 (*)     Alk Phosphatase 396 (*)     Total Protein 6.0 (*)     Albumin 2.0 (*)     Albumin/Globulin Ratio 0.5 (*)     All other components within normal limits         IMAGING:   CT ABDOMEN PELVIS W IV CONTRAST Additional Contrast? None   Final Result   1. Small left pleural effusion very small right pleural effusion. Patchy   bibasilar airspace disease and lingular groundglass attenuation.   2. Cirrhotic appearing liver. Moderate amount of free intraperitoneal fluid.   3. Extensive gallbladder wall thickening. Recommend dedicated ultrasound for   further evaluation.   4. Bilateral nephrolithiasis. Mild prominence of the left renal collecting   system. Hypoenhancement of the superior pole of the left kidney which may   represent pyelonephritis in the appropriate clinical context.   5. Atrophic pancreas and dilatation of the main pancreatic duct.   Nonvisualization of the central portal vein and SMV MRI of the abdomen\MRCP or   multiphasic pancreatic mass CT can be performed for further evaluation, as   indicated.   6. Colonic thickening consistent with colitis, as described above.   7. Presumed esophagectomy gastrectomy postoperative changes. Recommend   correlation with patient's clinical history      Electronically signed by Agustin Hale MD       GALLBLADDER RUQ    (Results Pending)        ECG/ECHO:  EKG: normal EKG, normal sinus rhythm.       Notes reviewed from all clinical/nonclinical/nursing services involved in patient's clinical care. Care coordination discussions were held with appropriate

## 2024-12-04 NOTE — PLAN OF CARE
Problem: Physical Therapy - Adult  Goal: By Discharge: Performs mobility at highest level of function for planned discharge setting.  See evaluation for individualized goals.  Description: FUNCTIONAL STATUS PRIOR TO ADMISSION: Patient was independent and active without use of DME.    HOME SUPPORT PRIOR TO ADMISSION: The patient lived with his spouse but did not require assistance.    Physical Therapy Goals  Initiated 12/4/2024  1.  Patient will move from supine to sit and sit to supine in bed with independence within 7 day(s).    2.  Patient will perform sit to stand with independence within 7 day(s).  3.  Patient will transfer from bed to chair and chair to bed with independence using the least restrictive device within 7 day(s).  4.  Patient will ambulate with independence for 150 feet with the least restrictive device within 7 day(s).   5.  Patient will ascend/descend 4 stairs with 1 handrail(s) with modified independence within 7 day(s).    Outcome: Progressing   PHYSICAL THERAPY EVALUATION    Patient: Teto Prince (59 y.o. male)  Date: 12/4/2024  Primary Diagnosis: Colitis [K52.9]  Chemotherapy-induced neutropenia (HCC) [D70.1, T45.1X5A]  Pneumonia of both lower lobes due to infectious organism [J18.9]       Precautions: Restrictions/Precautions:  (orthostatic hypotension)                      ASSESSMENT :   DEFICITS/IMPAIRMENTS:   The patient is limited by decreased activity tolerance, endurance, orthostatic hypotension     Based on the impairments listed above, the patient presents near his functional baseline with limitations related to his medical status > impairments or functional deficits. He presented to the hospital d/t abdominal pain and diarrhea. Work up revealed sepsis, pneumonia, and colitis. He has a history of hx of prostate cancer, esophageal cancer. He is s/p chemoradiation and esophagectomy/gastrectomy, gastric adenocarcinoma and recent metastatic recurrence of his cancer.   During eval, he

## 2024-12-04 NOTE — ED NOTES
TRANSFER - OUT REPORT:    Verbal report given to Ama Garcia on [facility]  being transferred to 3rd floor  for routine progression of patient care       Report consisted of patient's Situation, Background, Assessment and   Recommendations(SBAR).     Information from the following report(s) Nurse Handoff Report, ED Encounter Summary, and ED SBAR was reviewed with the receiving nurse.    [place] Fall Assessment:    Presents to emergency department  because of falls (Syncope, seizure, or loss of consciousness): No  Age > 70: No  Altered Mental Status, Intoxication with alcohol or substance confusion (Disorientation, impaired judgment, poor safety awaremess, or inability to follow instructions): No  Impaired Mobility: Ambulates or transfers with assistive devices or assistance; Unable to ambulate or transer.: No  Nursing Judgement: No          Lines:   Peripheral IV 12/03/24 Left Antecubital (Active)       Peripheral IV 12/03/24 Right Forearm (Active)        Opportunity for questions and clarification was provided.      Patient transported with:  Tech

## 2024-12-04 NOTE — PROGRESS NOTES
Physical Therapy Note:  Chart reviewed in preparation for eval. Patient cleared by nursing and attempted to see. Patient sleeping soundly and received by his spouse. She reports strong patient fatigue after traveling [place] and seeing OT. The patient's spouse asked to allow the patient to rest for now. Will defer and continue to follow.  John Muro, PT

## 2024-12-04 NOTE — PROGRESS NOTES
[facility] Dosing Services: Antimicrobial Stewardship Daily Doc  Consult for antibiotic dosing of Vancomycin by Dr. Matute  Indication: Febrile Neutropenia   Day of Therapy: 1    Ht Readings from Last 1 Encounters:   12/03/24 1.88 m (6' 2\")        Wt Readings from Last 1 Encounters:   12/03/24 64.3 kg (141 lb 12.1 oz)      Vancomycin therapy:  Loading dose: Vancomycin 1500 mg x1 dose now/given  Maintenance dose: Vancomycin 750 mg IV every 12 hours   Dose calculated to approximate a           a. Target AUC/KARISSA of 400-600    Last level: n/a  Plan:   -will start 750 mg every 12 hours, predicted OIHmy=377, Trough-ss=14.4  -will order a random level once 1st maintenance dose is given.   -CBC/BMP x 3 days    Dose administration notes: Doses given appropriately as scheduled    Other Antimicrobial   (not dosed by pharmacist) Cefepime 2 gram q8h EI  Flagyl 500 mg q8h   Cultures 12/03 Blood x2: IP  12/04 Urine: pending  12/03 Flu/Covid, negative, final   Serum Creatinine Lab Results   Component Value Date/Time    CREATININE 1.11 12/03/2024 08:50 PM          Creatinine Clearance Estimated Creatinine Clearance: 65 mL/min (based on SCr of 1.11 mg/dL).     Temp Temp: 98.8 °F (37.1 °C) (Oral)       WBC Lab Results   Component Value Date/Time    WBC 1.0 12/03/2024 08:50 PM          Procalcitonin Lab Results   Component Value Date/Time    PROCAL 3.55 08/14/2024 05:32 AM      For Antifungals, Metronidazole and Nafcillin: Lab Results   Component Value Date/Time    ALT 36 12/03/2024 08:50 PM    AST 54 12/03/2024 08:50 PM        Pharmacist: Karen StarksD, MS, BCPS    [phone]

## 2024-12-05 ENCOUNTER — APPOINTMENT (OUTPATIENT)
Facility: HOSPITAL | Age: 59
DRG: 871 | End: 2024-12-05
Payer: COMMERCIAL

## 2024-12-05 LAB
ALBUMIN SERPL-MCNC: 1.6 G/DL (ref 3.5–5)
ALBUMIN/GLOB SERPL: 0.4 (ref 1.1–2.2)
ALP SERPL-CCNC: 243 U/L (ref 45–117)
ALT SERPL-CCNC: 42 U/L (ref 12–78)
ANION GAP SERPL CALC-SCNC: 7 MMOL/L (ref 2–12)
AST SERPL-CCNC: 71 U/L (ref 15–37)
BACTERIA SPEC CULT: NORMAL
BACTERIA SPEC CULT: NORMAL
BASOPHILS # BLD: 0 K/UL (ref 0–0.1)
BASOPHILS NFR BLD: 0 % (ref 0–1)
BILIRUB DIRECT SERPL-MCNC: 0.4 MG/DL (ref 0–0.2)
BILIRUB SERPL-MCNC: 0.8 MG/DL (ref 0.2–1)
BUN SERPL-MCNC: 24 MG/DL (ref 6–20)
BUN/CREAT SERPL: 18 (ref 12–20)
C COLI+JEJUNI TUF STL QL NAA+PROBE: NEGATIVE
C DIFF GDH STL QL: NEGATIVE
C DIFF TOX A+B STL QL IA: NEGATIVE
C DIFF TOXIN INTERPRETATION: NORMAL
CALCIUM SERPL-MCNC: 7.4 MG/DL (ref 8.5–10.1)
CHLORIDE SERPL-SCNC: 113 MMOL/L (ref 97–108)
CO2 SERPL-SCNC: 18 MMOL/L (ref 21–32)
CREAT SERPL-MCNC: 1.35 MG/DL (ref 0.7–1.3)
DIFFERENTIAL METHOD BLD: ABNORMAL
EC STX1+STX2 GENES STL QL NAA+PROBE: NEGATIVE
EOSINOPHIL # BLD: 0 K/UL (ref 0–0.4)
EOSINOPHIL NFR BLD: 0 % (ref 0–7)
ERYTHROCYTE [DISTWIDTH] IN BLOOD BY AUTOMATED COUNT: 17.1 % (ref 11.5–14.5)
ETEC ELTA+ESTB GENES STL QL NAA+PROBE: NEGATIVE
GLOBULIN SER CALC-MCNC: 3.8 G/DL (ref 2–4)
GLUCOSE SERPL-MCNC: 111 MG/DL (ref 65–100)
HCT VFR BLD AUTO: 33.5 % (ref 36.6–50.3)
HGB BLD-MCNC: 10.8 G/DL (ref 12.1–17)
IMM GRANULOCYTES # BLD AUTO: 0 K/UL
IMM GRANULOCYTES NFR BLD AUTO: 0 %
LACTATE SERPL-SCNC: 1.9 MMOL/L (ref 0.4–2)
LYMPHOCYTES # BLD: 0 K/UL (ref 0.8–3.5)
LYMPHOCYTES NFR BLD: 0 % (ref 12–49)
MAGNESIUM SERPL-MCNC: 1.4 MG/DL (ref 1.6–2.4)
MCH RBC QN AUTO: 29.8 PG (ref 26–34)
MCHC RBC AUTO-ENTMCNC: 32.2 G/DL (ref 30–36.5)
MCV RBC AUTO: 92.5 FL (ref 80–99)
METAMYELOCYTES NFR BLD MANUAL: 1 %
MONOCYTES # BLD: 1.8 K/UL (ref 0–1)
MONOCYTES NFR BLD: 6 % (ref 5–13)
NEUTS BAND NFR BLD MANUAL: 13 % (ref 0–6)
NEUTS SEG # BLD: 27.6 K/UL (ref 1.8–8)
NEUTS SEG NFR BLD: 80 % (ref 32–75)
NRBC # BLD: 0 K/UL (ref 0–0.01)
NRBC BLD-RTO: 0 PER 100 WBC
P SHIGELLOIDES DNA STL QL NAA+PROBE: NEGATIVE
PLATELET # BLD AUTO: 349 K/UL (ref 150–400)
PMV BLD AUTO: 9.4 FL (ref 8.9–12.9)
POTASSIUM SERPL-SCNC: 4.8 MMOL/L (ref 3.5–5.1)
PROCALCITONIN SERPL-MCNC: 26.91 NG/ML
PROT SERPL-MCNC: 5.4 G/DL (ref 6.4–8.2)
RBC # BLD AUTO: 3.62 M/UL (ref 4.1–5.7)
RBC MORPH BLD: ABNORMAL
SALMONELLA SP SPAO STL QL NAA+PROBE: NEGATIVE
SERVICE CMNT-IMP: NORMAL
SHIGELLA SP+EIEC IPAH STL QL NAA+PROBE: NEGATIVE
SODIUM SERPL-SCNC: 138 MMOL/L (ref 136–145)
V CHOL+PARA+VUL DNA STL QL NAA+NON-PROBE: NEGATIVE
VANCOMYCIN SERPL-MCNC: 16.9 UG/ML
WBC # BLD AUTO: 29.7 K/UL (ref 4.1–11.1)
Y ENTEROCOL DNA STL QL NAA+NON-PROBE: NEGATIVE

## 2024-12-05 PROCEDURE — 2060000000 HC ICU INTERMEDIATE R&B

## 2024-12-05 PROCEDURE — 0W9G3ZZ DRAINAGE OF PERITONEAL CAVITY, PERCUTANEOUS APPROACH: ICD-10-PCS | Performed by: INTERNAL MEDICINE

## 2024-12-05 PROCEDURE — 89055 LEUKOCYTE ASSESSMENT FECAL: CPT

## 2024-12-05 PROCEDURE — A9537 TC99M MEBROFENIN: HCPCS

## 2024-12-05 PROCEDURE — 88112 CYTOPATH CELL ENHANCE TECH: CPT

## 2024-12-05 PROCEDURE — 78227 HEPATOBIL SYST IMAGE W/DRUG: CPT

## 2024-12-05 PROCEDURE — 87324 CLOSTRIDIUM AG IA: CPT

## 2024-12-05 PROCEDURE — 6370000000 HC RX 637 (ALT 250 FOR IP): Performed by: INTERNAL MEDICINE

## 2024-12-05 PROCEDURE — 36415 COLL VENOUS BLD VENIPUNCTURE: CPT

## 2024-12-05 PROCEDURE — 2580000003 HC RX 258: Performed by: INTERNAL MEDICINE

## 2024-12-05 PROCEDURE — 87506 IADNA-DNA/RNA PROBE TQ 6-11: CPT

## 2024-12-05 PROCEDURE — 80076 HEPATIC FUNCTION PANEL: CPT

## 2024-12-05 PROCEDURE — 80048 BASIC METABOLIC PNL TOTAL CA: CPT

## 2024-12-05 PROCEDURE — 6360000002 HC RX W HCPCS: Performed by: INTERNAL MEDICINE

## 2024-12-05 PROCEDURE — 87449 NOS EACH ORGANISM AG IA: CPT

## 2024-12-05 PROCEDURE — 88305 TISSUE EXAM BY PATHOLOGIST: CPT

## 2024-12-05 PROCEDURE — 83735 ASSAY OF MAGNESIUM: CPT

## 2024-12-05 PROCEDURE — 84145 PROCALCITONIN (PCT): CPT

## 2024-12-05 PROCEDURE — 87040 BLOOD CULTURE FOR BACTERIA: CPT

## 2024-12-05 PROCEDURE — 80202 ASSAY OF VANCOMYCIN: CPT

## 2024-12-05 PROCEDURE — 85025 COMPLETE CBC W/AUTO DIFF WBC: CPT

## 2024-12-05 PROCEDURE — 94761 N-INVAS EAR/PLS OXIMETRY MLT: CPT

## 2024-12-05 PROCEDURE — 83605 ASSAY OF LACTIC ACID: CPT

## 2024-12-05 PROCEDURE — 3430000000 HC RX DIAGNOSTIC RADIOPHARMACEUTICAL

## 2024-12-05 RX ORDER — SODIUM CHLORIDE 9 MG/ML
INJECTION, SOLUTION INTRAVENOUS CONTINUOUS
Status: DISPENSED | OUTPATIENT
Start: 2024-12-05 | End: 2024-12-06

## 2024-12-05 RX ORDER — MAGNESIUM SULFATE HEPTAHYDRATE 40 MG/ML
2000 INJECTION, SOLUTION INTRAVENOUS ONCE
Status: DISCONTINUED | OUTPATIENT
Start: 2024-12-05 | End: 2024-12-05

## 2024-12-05 RX ORDER — KIT FOR THE PREPARATION OF TECHNETIUM TC 99M MEBROFENIN 45 MG/10ML
5.6 INJECTION, POWDER, LYOPHILIZED, FOR SOLUTION INTRAVENOUS ONCE
Status: COMPLETED | OUTPATIENT
Start: 2024-12-05 | End: 2024-12-05

## 2024-12-05 RX ADMIN — MIDODRINE HYDROCHLORIDE 10 MG: 5 TABLET ORAL at 12:32

## 2024-12-05 RX ADMIN — PHENYLEPHRINE HYDROCHLORIDE 80 MCG/MIN: 10 INJECTION INTRAVENOUS at 10:54

## 2024-12-05 RX ADMIN — CEFEPIME 2000 MG: 2 INJECTION, POWDER, FOR SOLUTION INTRAVENOUS at 09:34

## 2024-12-05 RX ADMIN — MEBROFENIN 5.6 MILLICURIE: 45 INJECTION, POWDER, LYOPHILIZED, FOR SOLUTION INTRAVENOUS at 13:52

## 2024-12-05 RX ADMIN — TAMSULOSIN HYDROCHLORIDE 0.4 MG: 0.4 CAPSULE ORAL at 09:13

## 2024-12-05 RX ADMIN — VANCOMYCIN HYDROCHLORIDE 750 MG: 750 INJECTION, POWDER, LYOPHILIZED, FOR SOLUTION INTRAVENOUS at 17:59

## 2024-12-05 RX ADMIN — PROCHLORPERAZINE EDISYLATE 5 MG: 5 INJECTION INTRAMUSCULAR; INTRAVENOUS at 05:27

## 2024-12-05 RX ADMIN — ENOXAPARIN SODIUM 40 MG: 100 INJECTION SUBCUTANEOUS at 09:13

## 2024-12-05 RX ADMIN — OXYCODONE 5 MG: 5 TABLET ORAL at 18:18

## 2024-12-05 RX ADMIN — SODIUM CHLORIDE, PRESERVATIVE FREE 10 ML: 5 INJECTION INTRAVENOUS at 20:46

## 2024-12-05 RX ADMIN — ONDANSETRON 4 MG: 2 INJECTION INTRAMUSCULAR; INTRAVENOUS at 17:08

## 2024-12-05 RX ADMIN — CEFEPIME 2000 MG: 2 INJECTION, POWDER, FOR SOLUTION INTRAVENOUS at 20:48

## 2024-12-05 RX ADMIN — MIDODRINE HYDROCHLORIDE 10 MG: 5 TABLET ORAL at 17:08

## 2024-12-05 RX ADMIN — METRONIDAZOLE 500 MG: 500 INJECTION, SOLUTION INTRAVENOUS at 01:31

## 2024-12-05 RX ADMIN — OXYCODONE 5 MG: 5 TABLET ORAL at 01:28

## 2024-12-05 RX ADMIN — CEFEPIME 2000 MG: 2 INJECTION, POWDER, FOR SOLUTION INTRAVENOUS at 02:32

## 2024-12-05 RX ADMIN — METRONIDAZOLE 500 MG: 500 INJECTION, SOLUTION INTRAVENOUS at 09:43

## 2024-12-05 RX ADMIN — METRONIDAZOLE 500 MG: 500 INJECTION, SOLUTION INTRAVENOUS at 17:58

## 2024-12-05 RX ADMIN — ONDANSETRON 4 MG: 2 INJECTION INTRAMUSCULAR; INTRAVENOUS at 01:28

## 2024-12-05 RX ADMIN — MAGNESIUM SULFATE HEPTAHYDRATE 2000 MG: 40 INJECTION, SOLUTION INTRAVENOUS at 05:23

## 2024-12-05 RX ADMIN — MORPHINE SULFATE 2 MG: 2 INJECTION, SOLUTION INTRAMUSCULAR; INTRAVENOUS at 15:14

## 2024-12-05 RX ADMIN — SODIUM CHLORIDE, PRESERVATIVE FREE 10 ML: 5 INJECTION INTRAVENOUS at 09:14

## 2024-12-05 RX ADMIN — SODIUM CHLORIDE: 9 INJECTION, SOLUTION INTRAVENOUS at 09:29

## 2024-12-05 RX ADMIN — MORPHINE SULFATE 2 MG: 2 INJECTION, SOLUTION INTRAMUSCULAR; INTRAVENOUS at 05:19

## 2024-12-05 RX ADMIN — VANCOMYCIN HYDROCHLORIDE 750 MG: 750 INJECTION, POWDER, LYOPHILIZED, FOR SOLUTION INTRAVENOUS at 03:34

## 2024-12-05 RX ADMIN — MIDODRINE HYDROCHLORIDE 10 MG: 5 TABLET ORAL at 09:13

## 2024-12-05 RX ADMIN — MORPHINE SULFATE 2 MG: 2 INJECTION, SOLUTION INTRAMUSCULAR; INTRAVENOUS at 09:40

## 2024-12-05 ASSESSMENT — PAIN DESCRIPTION - LOCATION
LOCATION: ABDOMEN

## 2024-12-05 ASSESSMENT — PAIN DESCRIPTION - ORIENTATION
ORIENTATION: LOWER;RIGHT;LEFT
ORIENTATION: RIGHT;LEFT;LOWER
ORIENTATION: RIGHT;LEFT;LOWER

## 2024-12-05 ASSESSMENT — PAIN SCALES - GENERAL
PAINLEVEL_OUTOF10: 0
PAINLEVEL_OUTOF10: 0
PAINLEVEL_OUTOF10: 7
PAINLEVEL_OUTOF10: 7
PAINLEVEL_OUTOF10: 8
PAINLEVEL_OUTOF10: 6
PAINLEVEL_OUTOF10: 6

## 2024-12-05 ASSESSMENT — PAIN DESCRIPTION - DESCRIPTORS
DESCRIPTORS: ACHING

## 2024-12-05 NOTE — CARE COORDINATION
2:07 PM  12/05/24 12/05/24 3180   Service Assessment   Patient Orientation Alert and Oriented   Cognition Alert   History Provided By Patient;Medical Record   Primary Caregiver Self   Support Systems Spouse/Significant Other;Family Members   Patient's Healthcare Decision Maker is: Legal Next of Kin   PCP Verified by CM Yes   Last Visit to PCP Within last 6 months   Prior Functional Level Independent in ADLs/IADLs   Current Functional Level Independent in ADLs/IADLs   Can patient return to prior living arrangement Yes   Ability to make needs known: Good   Family able to assist with home care needs: Yes   Would you like for me to discuss the discharge plan with any other family members/significant others, and if so, who? No   Financial Resources Other (Comment)  (commercial insurance)   Community Resources None   Social/Functional History   Lives With Spouse   Type of Home House   Home Layout Two level;Bed/Bath upstairs   Home Equipment None   Prior Level of Assist for ADLs Independent   Prior Level of Assist for Homemaking Independent   Ambulation Assistance Independent   Prior Level of Assist for Transfers Independent   Active  Yes   Discharge Planning   Type of Residence House   Living Arrangements Spouse/Significant Other   Potential Assistance Needed N/A   Potential Assistance Purchasing Medications No   Patient expects to be discharged to: House   Services At/After Discharge   Mode of Transport at Discharge Other (see comment)  (Family will transport at dc)         Transition of Care Plan:      CM reviewed EMR- pt is independent at baseline and lives with spouse, no DME needed.    No anticipated CM needs- please consult CM if needs arise.    MOISES Jara

## 2024-12-05 NOTE — PROGRESS NOTES
[facility] Dosing Services: Antimicrobial Stewardship Daily Doc  Consult for antibiotic dosing of Vancomycin by Dr. Matute  Indication: Sepsis 2/2 Febrile Neutropenia/Acute colitis/Gastric adenocarcinoma with mets  Day of Therapy: 2    Ht Readings from Last 1 Encounters:   12/04/24 1.88 m (6' 2\")        Wt Readings from Last 1 Encounters:   12/03/24 64.3 kg (141 lb 12.1 oz)      Vancomycin therapy:  Loading dose: Vancomycin 1500 mg x1 dose given on 12/4 @0334  Maintenance dose: Vancomycin 750 mg IV q12hr   Dose calculated to approximate a           a. Target AUC/KARISSA of 400-600          b. Trough of 15-20 mcg/mL   Last level: will order at 4pm and moved dose to 6pm for now due to DAVID  Plan: continue same  Dose administration notes: Doses given appropriately as scheduled    Non-Kinetic Antimicrobial Dosing Regimen:   Current Regimen:  Cefepime 2 gm IV q8hr  Recommendation: Changed Cefepime to 2 gm IV q12hr  Dose administration notes: Doses given appropriately as scheduled    Other Antimicrobial   (not dosed by pharmacist) Flagyl 500 mg IV q8hr   Cultures 12/4: Nares: staph, no MRSA final  12/3: Blood: 1/2 bottles strep pending  12/3: Blood: PCR shows strep pending  12/3: Blood: NG x2 days pending   Serum Creatinine Lab Results   Component Value Date/Time    CREATININE 1.35 12/05/2024 03:41 AM          Creatinine Clearance Estimated Creatinine Clearance: 54 mL/min (A) (based on SCr of 1.35 mg/dL (H)).     Temp Temp: 97.5 °F (36.4 °C) (Oral)       WBC Lab Results   Component Value Date/Time    WBC 29.7 12/05/2024 03:41 AM          Procalcitonin Lab Results   Component Value Date/Time    PROCAL 26.91 12/05/2024 11:04 AM      For Antifungals, Metronidazole and Nafcillin: Lab Results   Component Value Date/Time    ALT 42 12/05/2024 03:41 AM    AST 71 12/05/2024 03:41 AM        Pharmacist: DERICK WASHINGTON, [facility]  [phone]

## 2024-12-05 NOTE — PROGRESS NOTES
Tried to see patient but he was downstairs getting HIDA.    Briefly he is a 60 yo with prior hx of esophageal cancer with recent recurrence as gastric cancer s/p resection/gastric pull up who is admitted with abdominal pain, weakness, diarrhea.    CT notable for cirrhosis, bilateral nephrolithiasis, atrophic pacreas w/ dilatation of main PD large volume ascites, colitis, GB wall thickening and sludge/stones. There is also nonvisualization of the central portal vein and SMV MRI of the abdomen\MRCP or multiphasic pancreatic mass CT can be performed for further evaluation, as indicated.    - he had MRA 6/2023 that noted \"Probable, focal, nonocclusive thrombus within the peripheral main left portal vein. The main portal vein, right portal vein, SMA and splenic vein are patent without evidence of thrombus. \"    He has been on immunotherapy for treatment of his cancer.     Plan:  - I have ordered paracentesis w/ fluid studies  - may need decompression of gallbladder w/ cholecystostomy tube - highly doubt he would be surgical candidate at this this juncture  - immunotherapy could be causing diarrhea but other sources including C. Diff and acute infections need to be ruled out  - empiric abx reasonable  - not sure that repeat imaging for abnormality noted regarding portal vein/SMV is necessary but can consider MRI/MRCP    Will see tomorrow.

## 2024-12-05 NOTE — PROGRESS NOTES
[facility]  [address] [place] [address] [phone]      Hospitalist Progress Note      NAME: Teto Prince   :  1965  MRM:  [id number]    Date of service: 2024  2:29 PM       Assessment and Plan:   Sepsis with septic shock 2/2 acute colitis in the setting of neutropenia - likely 2/2 colitis. Suspect infectious colitis rather then ischemic.  Check stool for Enterobacter, C. difficile, WBC.  Blood cultures NTD, UA not c/w UTI.  Empirically on cefepime, Flagyl, vancomycin.  Off pressors.     2.  Abnormal ultrasound of the gallbladder.  Thickened and sludge of gallbladder.  Patient denies RUQ abdominal pain.  General surgery was consulted.  Continue IV fluid.     3.  DAVID.  Most likely secondary to profuse diarrhea.  Started on IV fluid and monitor.     4.  Gastric adenocarcinoma.  Evaluated by oncology.  Recommended outpatient follow-up with his oncologist.     5.  Chronic pancreatic abnormality.  Patient takes Creon at home.     6.  History of prostate cancer - s/p prostatectomy.  Outpatient follow-up              Subjective:     Chief Complaint:: Patient was seen and examined as a follow up for sepsis.  Chart was reviewed.  C/O lower abdominal pain and diarrhea    ROS:  (bold if positive, if negative)    Tolerating PT  Tolerating Diet     v   Objective:     Last 24hrs VS reviewed since prior progress note. Most recent are:    Vitals:    24 1215   BP: 117/76   Pulse:    Resp:    Temp:    SpO2:      SpO2 Readings from Last 6 Encounters:   24 100%   24 96%   24 100%          Intake/Output Summary (Last 24 hours) at 2024 1429  Last data filed at 2024 0434  Gross per 24 hour   Intake 7000.63 ml   Output --   Net 7000.63 ml        Physical Exam:    Gen:  Well-developed, well-nourished, in no acute distress  HEENT:  Pink conjunctivae, PERRL, hearing intact to voice, moist mucous membranes  Neck:  Supple, without masses, thyroid  discussed with: Patient, Nursing Staff, and >50% of time spent in counseling and coordination of care    Discussed:  Care Plan    Prophylaxis:  Lovenox    Disposition:  Home w/Family           ___________________________________________________    Attending Physician: Micheal Cameron MD

## 2024-12-05 NOTE — PROGRESS NOTES
Occupational Therapy: defer    Attempted OT treatment, consulted with RN.  Patient is off the floor for procedure.  Will defer OT at this time and will follow-up as able and appropriate.    Tai Schaeffer, OTR/L

## 2024-12-05 NOTE — PROGRESS NOTES
[facility] Dosing Services: Antimicrobial Stewardship Daily Doc  Consult for antibiotic dosing of Vancomycin by Dr. Matute  Indication: Sepsis 2/2 Febrile Neutropenia/Acute colitis/Gastric adenocarcinoma with mets  Day of Therapy: 3    Ht Readings from Last 1 Encounters:   12/04/24 1.88 m (6' 2\")        Wt Readings from Last 1 Encounters:   12/03/24 64.3 kg (141 lb 12.1 oz)      Vancomycin therapy:  Loading dose: Vancomycin 1500 mg x1 dose given on 12/4 @0334  Maintenance dose: Vancomycin 1,250 mg IV q24hr   Dose calculated to approximate a           a. Target AUC/KARISSA of 400-600          b. Trough of 15-20 mcg/mL     Last level: 16.9 mcg/ml    Plan:  - Vancomycin 750mg q12 now predicted to be supratherapeutic with an   - Maintenance dose changed to vancomycin 1,250mg q24 -> predicted , Ctr 14.1, T1/2 11.2  - New dose scheduled to start 12/06 at 0900  - BMP x7 days ordered  - Level ordered for 12/07 with AM labs    Non-Kinetic Antimicrobial Dosing Regimen:   Current Regimen:  Cefepime 2 gm IV q8hr  Recommendation: Changed Cefepime to 2 gm IV q12hr    Other Antimicrobial   (not dosed by pharmacist) Flagyl 500 mg IV q8hr   Cultures 12/05: Blood x2: in process  12/04: Nares: staph, no MRSA final  12/03: Blood: 1/2 bottles strep - prelim  12/03: Blood PCR: strep - final  12/03: Blood: NG x2 days - prelim   Serum Creatinine Lab Results   Component Value Date/Time    CREATININE 1.35 12/05/2024 03:41 AM          Creatinine Clearance Estimated Creatinine Clearance: 54 mL/min (A) (based on SCr of 1.35 mg/dL (H)).     Temp Temp: 98.2 °F (36.8 °C) (Oral)       WBC Lab Results   Component Value Date/Time    WBC 29.7 12/05/2024 03:41 AM          Procalcitonin Lab Results   Component Value Date/Time    PROCAL 26.91 12/05/2024 11:04 AM      For Antifungals, Metronidazole and Nafcillin: Lab Results   Component Value Date/Time    ALT 42 12/05/2024 03:41 AM    AST 71 12/05/2024 03:41 AM        Jacob Chavez

## 2024-12-05 NOTE — PLAN OF CARE
Problem: Discharge Planning  Goal: Discharge to home or other facility with appropriate resources  12/5/2024 0221 by Aileen Esposito RN  Outcome: Progressing  12/4/2024 1306 by Hilda Peters RN  Outcome: Progressing  Flowsheets (Taken 12/4/2024 0730)  Discharge to home or other facility with appropriate resources: Identify barriers to discharge with patient and caregiver     Problem: Pain  Goal: Verbalizes/displays adequate comfort level or baseline comfort level  12/5/2024 0221 by Aileen Esposito RN  Outcome: Progressing  12/4/2024 1306 by Hilda Peters RN  Outcome: Progressing     Problem: Safety - Adult  Goal: Free from fall injury  12/5/2024 0221 by Aileen Esposito RN  Outcome: Progressing  12/4/2024 1306 by Hilda Peters RN  Outcome: Progressing     Problem: Nutrition Deficit:  Goal: Optimize nutritional status  12/5/2024 0221 by Aileen Esposito RN  Outcome: Progressing  12/4/2024 1306 by Hilda Peters RN  Outcome: Progressing     Problem: Occupational Therapy - Adult  Goal: By Discharge: Performs self-care activities at highest level of function for planned discharge setting.  See evaluation for individualized goals.  Description: FUNCTIONAL STATUS PRIOR TO ADMISSION:  Patient is independent for self care and functional transfers/mobility.     HOME SUPPORT: Patient lived with spouse but didn't require assistance.    Occupational Therapy Goals:  Initiated 12/4/2024  1.  Patient will perform grooming with Mobile within 7 day(s).  2.  Patient will perform upper body dressing with Mobile within 7 day(s).  3.  Patient will perform lower body dressing with Mobile within 7 day(s).  4.  Patient will perform toilet transfers with Mobile  within 7 day(s).  5.  Patient will perform all aspects of toileting with Mobile within 7 day(s).  6.  Patient will participate in upper extremity therapeutic exercise/activities with Mobile for 10 minutes within 7 day(s).    7.  Patient will  utilize energy conservation techniques during functional activities with verbal cues within 7 day(s).      12/4/2024 1313 by Nel Weathers OT  Outcome: Progressing     Problem: Physical Therapy - Adult  Goal: By Discharge: Performs mobility at highest level of function for planned discharge setting.  See evaluation for individualized goals.  Description: FUNCTIONAL STATUS PRIOR TO ADMISSION: Patient was independent and active without use of DME.    HOME SUPPORT PRIOR TO ADMISSION: The patient lived with his spouse but did not require assistance.    Physical Therapy Goals  Initiated 12/4/2024  1.  Patient will move from supine to sit and sit to supine in bed with independence within 7 day(s).    2.  Patient will perform sit to stand with independence within 7 day(s).  3.  Patient will transfer from bed to chair and chair to bed with independence using the least restrictive device within 7 day(s).  4.  Patient will ambulate with independence for 150 feet with the least restrictive device within 7 day(s).   5.  Patient will ascend/descend 4 stairs with 1 handrail(s) with modified independence within 7 day(s).    12/4/2024 1741 by John Muro, PT  Outcome: Progressing

## 2024-12-06 ENCOUNTER — APPOINTMENT (OUTPATIENT)
Facility: HOSPITAL | Age: 59
DRG: 871 | End: 2024-12-06
Payer: COMMERCIAL

## 2024-12-06 VITALS
HEART RATE: 110 BPM | RESPIRATION RATE: 27 BRPM | HEIGHT: 74 IN | BODY MASS INDEX: 18.19 KG/M2 | OXYGEN SATURATION: 94 % | SYSTOLIC BLOOD PRESSURE: 132 MMHG | DIASTOLIC BLOOD PRESSURE: 80 MMHG | TEMPERATURE: 98.2 F | WEIGHT: 141.76 LBS

## 2024-12-06 PROBLEM — B95.5 BACTEREMIA DUE TO STREPTOCOCCUS: Status: ACTIVE | Noted: 2024-12-06

## 2024-12-06 PROBLEM — K81.9 CHOLECYSTITIS: Status: ACTIVE | Noted: 2024-12-06

## 2024-12-06 PROBLEM — R78.81 BACTEREMIA DUE TO STREPTOCOCCUS: Status: ACTIVE | Noted: 2024-12-06

## 2024-12-06 LAB
ALBUMIN FLD-MCNC: 0.7 G/DL
ALBUMIN SERPL-MCNC: 1.5 G/DL (ref 3.5–5)
ALBUMIN/GLOB SERPL: 0.4 (ref 1.1–2.2)
ALP SERPL-CCNC: 188 U/L (ref 45–117)
ALT SERPL-CCNC: 33 U/L (ref 12–78)
ANION GAP SERPL CALC-SCNC: 9 MMOL/L (ref 2–12)
APPEARANCE FLD: ABNORMAL
AST SERPL-CCNC: 44 U/L (ref 15–37)
BASOPHILS # BLD: 0 K/UL (ref 0–0.1)
BASOPHILS NFR BLD: 0 % (ref 0–1)
BILIRUB DIRECT SERPL-MCNC: 0.4 MG/DL (ref 0–0.2)
BILIRUB SERPL-MCNC: 0.8 MG/DL (ref 0.2–1)
BUN SERPL-MCNC: 32 MG/DL (ref 6–20)
BUN/CREAT SERPL: 25 (ref 12–20)
CALCIUM SERPL-MCNC: 7.6 MG/DL (ref 8.5–10.1)
CHLORIDE SERPL-SCNC: 112 MMOL/L (ref 97–108)
CO2 SERPL-SCNC: 16 MMOL/L (ref 21–32)
COLOR FLD: YELLOW
CREAT SERPL-MCNC: 1.28 MG/DL (ref 0.7–1.3)
DIFFERENTIAL METHOD BLD: ABNORMAL
EOSINOPHIL # BLD: 0 K/UL (ref 0–0.4)
EOSINOPHIL NFR BLD: 0 % (ref 0–7)
ERYTHROCYTE [DISTWIDTH] IN BLOOD BY AUTOMATED COUNT: 16.9 % (ref 11.5–14.5)
GLOBULIN SER CALC-MCNC: 3.6 G/DL (ref 2–4)
GLUCOSE SERPL-MCNC: 99 MG/DL (ref 65–100)
HCT VFR BLD AUTO: 28.9 % (ref 36.6–50.3)
HGB BLD-MCNC: 9.8 G/DL (ref 12.1–17)
IMM GRANULOCYTES # BLD AUTO: 0 K/UL
IMM GRANULOCYTES NFR BLD AUTO: 0 %
LYMPHOCYTES # BLD: 0.3 K/UL (ref 0.8–3.5)
LYMPHOCYTES NFR BLD: 1 % (ref 12–49)
LYMPHOCYTES NFR FLD: 4 %
MAGNESIUM SERPL-MCNC: 2 MG/DL (ref 1.6–2.4)
MCH RBC QN AUTO: 30.2 PG (ref 26–34)
MCHC RBC AUTO-ENTMCNC: 33.9 G/DL (ref 30–36.5)
MCV RBC AUTO: 88.9 FL (ref 80–99)
MONOCYTES # BLD: 0.3 K/UL (ref 0–1)
MONOCYTES NFR BLD: 1 % (ref 5–13)
MONOS+MACROS NFR FLD: 1 %
NEUTROPHILS NFR FLD: 95 %
NEUTS BAND NFR BLD MANUAL: 6 % (ref 0–6)
NEUTS SEG # BLD: 24.9 K/UL (ref 1.8–8)
NEUTS SEG NFR BLD: 92 % (ref 32–75)
NRBC # BLD: 0 K/UL (ref 0–0.01)
NRBC BLD-RTO: 0 PER 100 WBC
NUC CELL # FLD: 5881 /CU MM
PLATELET # BLD AUTO: 333 K/UL (ref 150–400)
PMV BLD AUTO: 9.3 FL (ref 8.9–12.9)
POTASSIUM SERPL-SCNC: 4.5 MMOL/L (ref 3.5–5.1)
PROT FLD-MCNC: 2.1 G/DL
PROT SERPL-MCNC: 5.1 G/DL (ref 6.4–8.2)
RBC # BLD AUTO: 3.25 M/UL (ref 4.1–5.7)
RBC # FLD: >100 /CU MM
RBC MORPH BLD: ABNORMAL
SARS-COV-2 RNA RESP QL NAA+PROBE: NOT DETECTED
SODIUM SERPL-SCNC: 137 MMOL/L (ref 136–145)
SOURCE: NORMAL
SPECIMEN SOURCE FLD: ABNORMAL
SPECIMEN SOURCE FLD: NORMAL
SPECIMEN SOURCE FLD: NORMAL
WBC # BLD AUTO: 25.5 K/UL (ref 4.1–11.1)

## 2024-12-06 PROCEDURE — 6360000002 HC RX W HCPCS: Performed by: INTERNAL MEDICINE

## 2024-12-06 PROCEDURE — 87205 SMEAR GRAM STAIN: CPT

## 2024-12-06 PROCEDURE — 2580000003 HC RX 258: Performed by: INTERNAL MEDICINE

## 2024-12-06 PROCEDURE — 99223 1ST HOSP IP/OBS HIGH 75: CPT | Performed by: INTERNAL MEDICINE

## 2024-12-06 PROCEDURE — 82042 OTHER SOURCE ALBUMIN QUAN EA: CPT

## 2024-12-06 PROCEDURE — 84157 ASSAY OF PROTEIN OTHER: CPT

## 2024-12-06 PROCEDURE — 87635 SARS-COV-2 COVID-19 AMP PRB: CPT

## 2024-12-06 PROCEDURE — 6370000000 HC RX 637 (ALT 250 FOR IP): Performed by: INTERNAL MEDICINE

## 2024-12-06 PROCEDURE — 85025 COMPLETE CBC W/AUTO DIFF WBC: CPT

## 2024-12-06 PROCEDURE — 80048 BASIC METABOLIC PNL TOTAL CA: CPT

## 2024-12-06 PROCEDURE — 89050 BODY FLUID CELL COUNT: CPT

## 2024-12-06 PROCEDURE — C1729 CATH, DRAINAGE: HCPCS

## 2024-12-06 PROCEDURE — 83735 ASSAY OF MAGNESIUM: CPT

## 2024-12-06 PROCEDURE — 94761 N-INVAS EAR/PLS OXIMETRY MLT: CPT

## 2024-12-06 PROCEDURE — 87070 CULTURE OTHR SPECIMN AEROBIC: CPT

## 2024-12-06 PROCEDURE — 80076 HEPATIC FUNCTION PANEL: CPT

## 2024-12-06 PROCEDURE — 70450 CT HEAD/BRAIN W/O DYE: CPT

## 2024-12-06 PROCEDURE — 36415 COLL VENOUS BLD VENIPUNCTURE: CPT

## 2024-12-06 RX ORDER — MIDODRINE HYDROCHLORIDE 10 MG/1
10 TABLET ORAL
Qty: 90 TABLET | Refills: 3 | Status: SHIPPED | OUTPATIENT
Start: 2024-12-06

## 2024-12-06 RX ORDER — TAMSULOSIN HYDROCHLORIDE 0.4 MG/1
0.4 CAPSULE ORAL DAILY
Qty: 30 CAPSULE | Refills: 3 | Status: SHIPPED | OUTPATIENT
Start: 2024-12-07

## 2024-12-06 RX ORDER — LIDOCAINE HYDROCHLORIDE 10 MG/ML
10 INJECTION, SOLUTION EPIDURAL; INFILTRATION; INTRACAUDAL; PERINEURAL ONCE
Status: COMPLETED | OUTPATIENT
Start: 2024-12-06 | End: 2024-12-06

## 2024-12-06 RX ADMIN — OXYCODONE 5 MG: 5 TABLET ORAL at 09:00

## 2024-12-06 RX ADMIN — METRONIDAZOLE 500 MG: 500 INJECTION, SOLUTION INTRAVENOUS at 01:16

## 2024-12-06 RX ADMIN — MIDODRINE HYDROCHLORIDE 10 MG: 5 TABLET ORAL at 08:42

## 2024-12-06 RX ADMIN — LIDOCAINE HYDROCHLORIDE 10 ML: 10 INJECTION, SOLUTION EPIDURAL; INFILTRATION; INTRACAUDAL; PERINEURAL at 13:45

## 2024-12-06 RX ADMIN — PHENYLEPHRINE HYDROCHLORIDE 70 MCG/MIN: 10 INJECTION INTRAVENOUS at 01:19

## 2024-12-06 RX ADMIN — METRONIDAZOLE 500 MG: 500 INJECTION, SOLUTION INTRAVENOUS at 11:13

## 2024-12-06 RX ADMIN — VANCOMYCIN HYDROCHLORIDE 1250 MG: 1.25 INJECTION, POWDER, LYOPHILIZED, FOR SOLUTION INTRAVENOUS at 08:56

## 2024-12-06 RX ADMIN — PIPERACILLIN AND TAZOBACTAM 4500 MG: 4; .5 INJECTION, POWDER, FOR SOLUTION INTRAVENOUS at 15:00

## 2024-12-06 RX ADMIN — SODIUM CHLORIDE, PRESERVATIVE FREE 10 ML: 5 INJECTION INTRAVENOUS at 09:01

## 2024-12-06 RX ADMIN — MIDODRINE HYDROCHLORIDE 10 MG: 5 TABLET ORAL at 17:00

## 2024-12-06 RX ADMIN — TAMSULOSIN HYDROCHLORIDE 0.4 MG: 0.4 CAPSULE ORAL at 08:43

## 2024-12-06 RX ADMIN — MIDODRINE HYDROCHLORIDE 10 MG: 5 TABLET ORAL at 11:18

## 2024-12-06 RX ADMIN — CEFEPIME 2000 MG: 2 INJECTION, POWDER, FOR SOLUTION INTRAVENOUS at 08:52

## 2024-12-06 RX ADMIN — ENOXAPARIN SODIUM 40 MG: 100 INJECTION SUBCUTANEOUS at 08:43

## 2024-12-06 RX ADMIN — OXYCODONE 5 MG: 5 TABLET ORAL at 13:41

## 2024-12-06 RX ADMIN — OXYCODONE 5 MG: 5 TABLET ORAL at 20:15

## 2024-12-06 ASSESSMENT — PAIN SCALES - GENERAL
PAINLEVEL_OUTOF10: 4
PAINLEVEL_OUTOF10: 8
PAINLEVEL_OUTOF10: 2
PAINLEVEL_OUTOF10: 4
PAINLEVEL_OUTOF10: 8

## 2024-12-06 ASSESSMENT — PAIN DESCRIPTION - FREQUENCY: FREQUENCY: INTERMITTENT

## 2024-12-06 ASSESSMENT — PAIN DESCRIPTION - DESCRIPTORS: DESCRIPTORS: DISCOMFORT

## 2024-12-06 ASSESSMENT — PAIN DESCRIPTION - LOCATION
LOCATION: ABDOMEN
LOCATION: ABDOMEN

## 2024-12-06 ASSESSMENT — PAIN DESCRIPTION - ONSET: ONSET: ON-GOING

## 2024-12-06 ASSESSMENT — PAIN DESCRIPTION - PAIN TYPE: TYPE: ACUTE PAIN

## 2024-12-06 NOTE — CONSULTS
[facility]  Chuy Avila M.D.  [phone]                    GASTROENTEROLOGY CONSULTATION NOTE              NAME:  Teto Prince   :   1965   MRN:   [id number]       Referring Physician:    Dr. Cameron      Consult Date:   2024 10:24 AM    Chief Complaint:    Abdominal pain, diarrhea, abnormal imaging studies     History of Present Illness:    Patient is a 59 y.o. who is known with history of esophageal carcinoma, gastric adenocarcinoma, prostate cancer, biliary metal stent, liver cirrhosis with ascites presented with worsening abdominal pain, nausea and vomiting. Found in the ER with hypotension and elevated white cell count, admitted with sepsis. CT scan showing thickened GB wall with distended gallbladder, thickened colon wall and worsening ascites. He reports he started with diarrhea in the last couple of days. He denies any bleeding. His wife thinks he started Keytruda this past August. Today, she reports he is somewhat confused, he reports less abdominal pain, mainly discomfort in the mid-abdomen. He denies vomiting this morning. Vital signs have improved on IV fluids and IV antibiotics.     PMH:  Past Medical History:   Diagnosis Date    Esophageal cancer (HCC)         Gastric cancer (HCC)     gastrectomy, [facility]    Prostate cancer (HCC)         Pyloric stenosis, acquired 2022    Radiation exposure        PSH:  Past Surgical History:   Procedure Laterality Date    CT DRAINAGE ABDOM ABSCESS OPEN  10/4/2022    CT DRAINAGE ABDOM ABSCESS OPEN 10/4/2022    ESOPHAGECTOMY N/A 2008    removal of tumor from esophagus and stomach    GASTRECTOMY      PROSTATECTOMY N/A            Allergies:  No Known Allergies    Home Medications:  Prior to Admission Medications   Prescriptions Last Dose Informant Patient Reported? Taking?   acetaminophen (TYLENOL) 325 MG tablet   Yes No   Sig: Take by mouth every 4 hours as needed   ibuprofen (ADVIL;MOTRIN) 200 MG tablet   Yes

## 2024-12-06 NOTE — DISCHARGE SUMMARY
Hospitalist Discharge Summary     Patient ID:    Teto Prince  [id number]  59 y.o.  1965    Admit date of service: 12/3/2024    Discharge date of service: 12/6/2024    Admission Diagnoses: Colitis [K52.9]  Chemotherapy-induced neutropenia (HCC) [D70.1, T45.1X5A]  Pneumonia of both lower lobes due to infectious organism [J18.9]    Chronic Diagnoses:      Discharge Medications:   Current Discharge Medication List        START taking these medications    Details   tamsulosin (FLOMAX) 0.4 MG capsule Take 1 capsule by mouth daily  Qty: 30 capsule, Refills: 3      midodrine (PROAMATINE) 10 MG tablet Take 1 tablet by mouth 3 times daily (with meals)  Qty: 90 tablet, Refills: 3           CONTINUE these medications which have NOT CHANGED    Details   lactobacillus (CULTURELLE) capsule Take 1 capsule by mouth daily (with breakfast)  Qty: 30 capsule, Refills: 1      ondansetron (ZOFRAN) 8 MG tablet Take 1 tablet by mouth every 8 hours as needed for Nausea or Vomiting      acetaminophen (TYLENOL) 325 MG tablet Take by mouth every 4 hours as needed      oxyCODONE (ROXICODONE) 5 MG immediate release tablet Take by mouth every 4 hours as needed.           STOP taking these medications       ibuprofen (ADVIL;MOTRIN) 200 MG tablet Comments:   Reason for Stopping:         polyethylene glycol (GLYCOLAX) 17 GM/SCOOP powder Comments:   Reason for Stopping:               Follow up Care:    1. No follow-up provider specified. in 1-2 weeks  2.     Diet:  regular diet    Disposition:  [facility].    Advanced Directive:    Discharge Exam:  See today's note.    CONSULTATIONS: ID, GI, and general surgery    Significant Diagnostic Studies:   Recent Labs     12/05/24  0341 12/06/24  0520   WBC 29.7* 25.5*   HGB 10.8* 9.8*   HCT 33.5* 28.9*    333     Recent Labs     12/04/24  1109 12/05/24  0341 12/06/24  0520    138 137   K 4.9 4.8 4.5   * 113* 112*   CO2 18* 18* 16*   BUN 16 24* 32*   MG  --  1.4* 2.0     Recent Labs      diarrhea.  Started on IV fluid and monitor.  Improving     6.  Gastric adenocarcinoma.  Evaluated by oncology.  Recommended outpatient follow-up with his oncologist.     7.  Chronic pancreatic abnormality.  Patient takes Creon at home.     8.  History of prostate cancer - s/p prostatectomy.  Outpatient follow-up     Addendum 5:23 PM  Pt started to be confused this afternoon, which is new. Unclear cause. ? Possible pain med effect  Will check CT head stat.     06:15  CT scan of the head unremarkable         Discharged in stable condition.    Spent 35 minutes    Signed:  Micheal Cameron MD  12/6/2024  2:43 PM

## 2024-12-06 NOTE — PROGRESS NOTES
Reviewed chart and spoke with nurse.  Attempted PT, however patient just started paracentesis.  Will continue to follow and attempt PT at a later time.

## 2024-12-06 NOTE — CONSULTS
Infectious Disease Consult    Impression/Plan   Streptococcal bacteremia.  Growing in 1/4 bottles.  Repeat blood cultures are sterile to date.  This likely represents a contaminant but in the setting of immunosuppression and colitis will ask microbiology lab to workup the organism.  If this turns out to be an alpha Streptococcus, and repeat blood cultures remain sterile, then would consider this a contaminant with no further workup.  Possible acute cholecystitis.  General surgery and GI following.  No acute intervention planned at this time.  GI considering cholecystostomy tube.  Will narrow antibiotics to piperacillin-tazobactam.  Colitis/new onset of diarrhea.  Possibly related to immunotherapy or ischemia per GI.  C. difficile and enteric pathogen's panel negative.  Low suspicion for infectious etiology of diarrhea.  Paracentesis planned  Parainfluenza.  Supportive care.  Droplet isolation.  Leukocytosis.  Multifactorial.  C. difficile negative.  Follow trend  Gastric/esophageal cancer.  Patient on Keytruda.  Managed by oncology at [facility].  Right chest wall port.  Unremarkable appearing.  Not accessed for approximately 2-1/2 weeks.  Doubt this is the source of infection or fever  Dispo.  Plans noted for transfer to [facility]    Anti-infectives:   Vancomycin  Cefepime  Metronidazole    Subjective:   Date of Consultation:  December 6, 2024  Date of Admission: 12/3/2024   Referring Physician:     Patient is a 59 y.o. male who is being seen for bacteremia.  Patient has a very complicated past medical history.    Per HPI: \"Teto Prince is a very pleasant 59 y.o. male with a past history of prostate cancer, esophageal cancer, s/p chemoradiation and esophagectomy/gastrectomy, gastric adenocarcinoma, recent metastatic recurrence of his cancer, who had a recent admission to [facility] for nephrolithiasis, who presents to the ER due to intractable bilateral lower abdominal pain with nausea and vomiting.  He is being admitted for acute  in the electronic health records (e.g. problem list, visit note) on the day of the encounter     Signed By: Robert Woods DO     December 6, 2024

## 2024-12-06 NOTE — CARE COORDINATION
12:01 PM  12/06/24    Care Management Progress Note    Reason for Admission:   Colitis [K52.9]  Chemotherapy-induced neutropenia (HCC) [D70.1, T45.1X5A]  Pneumonia of both lower lobes due to infectious organism [J18.9]         Patient Admission Status: Inpatient  Date Admitted to INP: 12/4/24   RUR: Readmission Risk Score: 14.5    Hospitalization in the last 30 days (Readmission):  No        Transition of care plan:  Ongoing medical management- pt discussed during IDR; GI consulted.  Pt has requested transfer to [facility]- Dr. Cameron intiated the transfer process. [facility] has accepted pt and waiting on bed to become available.   Date IM given: [x]NA  Outpatient follow-up.  Discharge transport: Transfer Center will arrange transportation to [facility].    CM will follow.    MOISES Jara

## 2024-12-06 NOTE — DISCHARGE INSTRUCTIONS
ACUTE DIAGNOSES:  Colitis [K52.9]  Chemotherapy-induced neutropenia (HCC) [D70.1, T45.1X5A]  Pneumonia of both lower lobes due to infectious organism [J18.9]    CHRONIC MEDICAL DIAGNOSES:  [unfilled]    DISCHARGE MEDICATIONS:   [unfilled]    It is important that you take the medication exactly as they are prescribed.   Keep your medication in the bottles provided by the pharmacist and keep a list of the medication names, dosages, and times to be taken in your wallet.   Do not take other medications without consulting your doctor.       DIET:  regular diet    ACTIVITY: activity as tolerated    ADDITIONAL INFORMATION: If you experience any of the following symptoms then please call your primary care physician or return to the emergency room if you cannot get hold of your doctor: Fever, chills, nausea, vomiting, diarrhea, change in mentation, falling, bleeding, shortness of breath.    FOLLOW UP CARE:   @PCP@  you are to call and set up an appointment to see them in 5 days.    Follow-up  No follow-up provider specified.      Information obtained by :  I understand that if any problems occur once I am at home I am to contact my physician.    I understand and acknowledge receipt of the instructions indicated above.                                                                                                                                           Physician's or R.N.'s Signature                                                                  Date/Time                                                                                                                                              Patient or Representative Signature                                                          Date/Time

## 2024-12-06 NOTE — PROGRESS NOTES
[facility]  [address] [place] [address] [phone]      Hospitalist Progress Note      NAME: Teto Prince   :  1965  MRM:  [id number]    Date of service: 2024  1:09 PM       Assessment and Plan:   Sepsis with septic shock 2/2 acute colitis - Suspect infectious VS immunotherapy induced colitis.  Negative stool for Enterobacter, C. Difficile.  Continue IV fluid.  Wean pressor    2.   Bacteremia.  Blood culture: Streptococcus.  ID is consulted.  Antibiotics changed to Zosyn (Empirically on cefepime, Flagyl, vancomycin).  Repeated blood culture is negative so far.    3.  Ascites.  Plan for paracentesis.     4.  Abnormal ultrasound of the gallbladder.  Thickened and sludge of gallbladder.  Abnormal HIDA scan.  Patient denies RUQ abdominal pain.  General surgery was consulted.  Continue IV fluid.     5.  DAVID.  Most likely secondary to profuse diarrhea.  Started on IV fluid and monitor.  Improving     6.  Gastric adenocarcinoma.  Evaluated by oncology.  Recommended outpatient follow-up with his oncologist.     7.  Chronic pancreatic abnormality.  Patient takes Creon at home.     8.  History of prostate cancer - s/p prostatectomy.  Outpatient follow-up     I initiated transfer to [facility] as he is oncologist and other doctors are at [facility].  Patient is accepted and awaiting for a bed.       Subjective:     Chief Complaint:: Patient was seen and examined as a follow up for sepsis.  Chart was reviewed.  C/O lower abdominal pain and diarrhea    ROS:  (bold if positive, if negative)    Tolerating PT  Tolerating Diet     v   Objective:     Last 24hrs VS reviewed since prior progress note. Most recent are:    Vitals:    24 1114   BP: 123/85   Pulse: 96   Resp: 23   Temp: 97.7 °F (36.5 °C)   SpO2: 95%     SpO2 Readings from Last 6 Encounters:   24 95%   24 96%   24 100%        No intake or output data in the 24 hours ending 24 1309       Physical  medical record.  I have personally examined and treated the patient at bedside during this period.                 Care Plan discussed with: Patient, Nursing Staff, and >50% of time spent in counseling and coordination of care    Discussed:  Care Plan    Prophylaxis:  Lovenox    Disposition:  Home w/Family           ___________________________________________________    Attending Physician: Micheal Cameron MD

## 2024-12-07 LAB
BACTERIA SPEC CULT: NORMAL
GRAM STN SPEC: NORMAL
GRAM STN SPEC: NORMAL
SERVICE CMNT-IMP: NORMAL
WBC #/AREA STL HPF: NORMAL /HPF (ref 0–4)

## 2024-12-07 NOTE — PLAN OF CARE
Problem: Discharge Planning  Goal: Discharge to home or other facility with appropriate resources  Outcome: HH/HSPC Resolved Met     Problem: Pain  Goal: Verbalizes/displays adequate comfort level or baseline comfort level  Outcome: HH/HSPC Resolved Met     Problem: Safety - Adult  Goal: Free from fall injury  Outcome: HH/HSPC Resolved Met     Problem: Nutrition Deficit:  Goal: Optimize nutritional status  Outcome: HH/HSPC Resolved Met

## 2024-12-08 LAB
BACTERIA SPEC CULT: ABNORMAL
BACTERIA SPEC CULT: NORMAL
SERVICE CMNT-IMP: ABNORMAL
SERVICE CMNT-IMP: NORMAL

## 2024-12-09 LAB
BACTERIA SPEC CULT: NORMAL
SERVICE CMNT-IMP: NORMAL

## 2024-12-10 LAB
BACTERIA SPEC CULT: NORMAL
GRAM STN SPEC: NORMAL
GRAM STN SPEC: NORMAL
SERVICE CMNT-IMP: NORMAL

## 2024-12-12 LAB — CYTOLOGY-NON GYN: NORMAL

## (undated) DEVICE — TUBING HYDR IRR --

## (undated) DEVICE — FORCEPS BX L240CM JAW DIA2.8MM L CAP W/ NDL MIC MESH TOOTH

## (undated) DEVICE — Device: Brand: SINGLE USE INJECTOR NM600/610

## (undated) DEVICE — RETRIEVER ENDOSCP L230CM DIA2.5MM NET W3XL5.5CM MIN WRK CHN